# Patient Record
Sex: FEMALE | Race: WHITE | Employment: UNEMPLOYED | URBAN - METROPOLITAN AREA
[De-identification: names, ages, dates, MRNs, and addresses within clinical notes are randomized per-mention and may not be internally consistent; named-entity substitution may affect disease eponyms.]

---

## 2020-10-13 ENCOUNTER — HOSPITAL ENCOUNTER (OUTPATIENT)
Dept: BEHAVIORAL HEALTH | Facility: CLINIC | Age: 55
Discharge: HOME | End: 2020-10-13
Attending: PSYCHIATRY & NEUROLOGY | Admitting: PSYCHIATRY & NEUROLOGY
Payer: COMMERCIAL

## 2020-10-13 PROCEDURE — 90791 PSYCH DIAGNOSTIC EVALUATION: CPT | Mod: 95 | Performed by: COUNSELOR

## 2020-10-13 ASSESSMENT — ANXIETY QUESTIONNAIRES
4. TROUBLE RELAXING: MORE THAN HALF THE DAYS
3. WORRYING TOO MUCH ABOUT DIFFERENT THINGS: NEARLY EVERY DAY
2. NOT BEING ABLE TO STOP OR CONTROL WORRYING: NEARLY EVERY DAY
1. FEELING NERVOUS, ANXIOUS, OR ON EDGE: NEARLY EVERY DAY
7. FEELING AFRAID AS IF SOMETHING AWFUL MIGHT HAPPEN: NEARLY EVERY DAY
GAD7 TOTAL SCORE: 18
6. BECOMING EASILY ANNOYED OR IRRITABLE: MORE THAN HALF THE DAYS
5. BEING SO RESTLESS THAT IT IS HARD TO SIT STILL: MORE THAN HALF THE DAYS

## 2020-10-13 ASSESSMENT — PATIENT HEALTH QUESTIONNAIRE - PHQ9: SUM OF ALL RESPONSES TO PHQ QUESTIONS 1-9: 20
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
## 2020-10-14 ENCOUNTER — TELEPHONE (OUTPATIENT)
Dept: BEHAVIORAL HEALTH | Facility: CLINIC | Age: 55
End: 2020-10-14

## 2020-10-14 RX ORDER — HYDROXYZINE HYDROCHLORIDE 25 MG/1
25 TABLET, FILM COATED ORAL
COMMUNITY
Start: 2020-10-02

## 2020-10-14 RX ORDER — QUETIAPINE FUMARATE 50 MG/1
TABLET, FILM COATED ORAL
COMMUNITY
Start: 2020-10-02

## 2020-10-14 RX ORDER — DIVALPROEX SODIUM 250 MG/1
1000 TABLET, EXTENDED RELEASE ORAL
COMMUNITY
Start: 2020-08-11

## 2020-10-14 RX ORDER — LAMOTRIGINE 25 MG/1
TABLET ORAL
COMMUNITY
Start: 2020-10-02

## 2020-10-14 ASSESSMENT — ANXIETY QUESTIONNAIRES
3. WORRYING TOO MUCH ABOUT DIFFERENT THINGS: SEVERAL DAYS
GAD7 TOTAL SCORE: 11
5. BEING SO RESTLESS THAT IT IS HARD TO SIT STILL: SEVERAL DAYS
7. FEELING AFRAID AS IF SOMETHING AWFUL MIGHT HAPPEN: SEVERAL DAYS
IF YOU CHECKED OFF ANY PROBLEMS ON THIS QUESTIONNAIRE, HOW DIFFICULT HAVE THESE PROBLEMS MADE IT FOR YOU TO DO YOUR WORK, TAKE CARE OF THINGS AT HOME, OR GET ALONG WITH OTHER PEOPLE: VERY DIFFICULT
1. FEELING NERVOUS, ANXIOUS, OR ON EDGE: NEARLY EVERY DAY
GAD7 TOTAL SCORE: 18
6. BECOMING EASILY ANNOYED OR IRRITABLE: SEVERAL DAYS
2. NOT BEING ABLE TO STOP OR CONTROL WORRYING: SEVERAL DAYS

## 2020-10-14 ASSESSMENT — PATIENT HEALTH QUESTIONNAIRE - PHQ9
SUM OF ALL RESPONSES TO PHQ QUESTIONS 1-9: 18
5. POOR APPETITE OR OVEREATING: NEARLY EVERY DAY

## 2020-10-14 NOTE — TELEPHONE ENCOUNTER
Completed admission to A1 track in 55+ Program for start tomorrow 10/15/2020.  Preferred contact is via email at: [email or web address]    Completed successful video test with Jammie arce.  Will send a copy of her safety plan via mail to her per her request.    Roseanne Valdez, OTR/L

## 2020-10-15 ENCOUNTER — HOSPITAL ENCOUNTER (OUTPATIENT)
Dept: BEHAVIORAL HEALTH | Facility: CLINIC | Age: 55
End: 2020-10-15
Attending: PSYCHIATRY & NEUROLOGY
Payer: COMMERCIAL

## 2020-10-15 PROCEDURE — 90853 GROUP PSYCHOTHERAPY: CPT | Mod: 95

## 2020-10-15 PROCEDURE — G0177 OPPS/PHP; TRAIN & EDUC SERV: HCPCS | Mod: 95 | Performed by: OCCUPATIONAL THERAPIST

## 2020-10-15 PROCEDURE — G0177 OPPS/PHP; TRAIN & EDUC SERV: HCPCS | Mod: GT

## 2020-10-15 ASSESSMENT — ANXIETY QUESTIONNAIRES: GAD7 TOTAL SCORE: 11

## 2020-10-15 NOTE — GROUP NOTE
Psychoeducation Group Note    PATIENT'S NAME: Jammie Mariee  MRN:   [id number]  :   1965  ACCT. NUMBER: [id number]  DATE OF SERVICE: 10/15/20  START TIME: 11:00 AM  END TIME: 11:50 AM  FACILITATOR: Alicia He RN  TOPIC: DELMAR RN Group: Health Maintenance  55+ Program  TRACK: 1    NUMBER OF PARTICIPANTS: 4    Summary of Group / Topics Discussed:  Health Maintenance: Eight Dimensions of Wellness: The concept of holistic health through the model of eight dimensions was introduced. Group members participated in identifying behaviors and activities in each of the dimensions of wellness.  The importance of each dimension was reinforced and the concept of balance in life as it relates to wellness was explored.      Patient Session Goals / Objectives:    Verbalized understanding of balance in wellness and how it relates to their life    Identified and explained the eight dimensions of wellness    Categorized activities and wellness needs into corresponding dimensions appropriately during exercise    Telemedicine Visit: The patient's condition can be safely assessed and treated via synchronous audio and visual telemedicine encounter.      Reason for Telemedicine Visit: Services only offered telehealth    Originating Site (Patient Location): Patient's home    Distant Site (Provider Location): Provider Remote Setting    Consent:  The patient/guardian has verbally consented to: the potential risks and benefits of telemedicine (video visit) versus in person care; bill my insurance or make self-payment for services provided; and responsibility for payment of non-covered services.     Mode of Communication:  Video Conference via [employer]    As the provider I attest to compliance with applicable laws and regulations related to telemedicine.       Patient Participation / Response:  Fully participated with the group by sharing personal reflections / insights and openly received / provided feedback with other  participants.    Demonstrated understanding of topics discussed through group discussion and participation    Treatment Plan:  Patient has a current master individualized treatment plan.  See Epic treatment plan for more information.    Alicia He RN

## 2020-10-15 NOTE — GROUP NOTE
Telemedicine Visit: The patient's condition can be safely assessed and treated via synchronous audio and visual telemedicine encounter.      Reason for Telemedicine Visit: Services only offered telehealth    Originating Site (Patient Location): Patient's home    Distant Site (Provider Location): Provider Remote Setting    Consent:  The patient/guardian has verbally consented to: the potential risks and benefits of telemedicine (video visit) versus in person care; bill my insurance or make self-payment for services provided; and responsibility for payment of non-covered services.     Mode of Communication:  Video Conference via [employer]    As the provider I attest to compliance with applicable laws and regulations related to telemedicine.  Process Group Note    PATIENT'S NAME: Jammie Mariee  MRN:   [id number]  :   1965  ACCT. NUMBER: [id number]  DATE OF SERVICE: 10/15/20  START TIME:  9:00 AM  END TIME:  9:50 AM  FACILITATOR: Ermelinda Flynn LMFT  TOPIC:  Process Group    Diagnoses:  296.53 Bipolar I Disorder Current or Most Recent Episode Depressed, Severe    4. Other Diagnoses that is relevant to services:   300.00 (F41.9) Unspecified Anxiety Disorder       55+ Program  TRACK: A1    NUMBER OF PARTICIPANTS: 4        Data:    Session content: At the start of this group, patients were invited to check in by identifying themselves, describing their current emotional status, and identifying issues to address in this group.   Area(s) of treatment focus addressed in this session included Symptom Management.  Processed struggling with changes in her life over the past couple months. Reports symptoms of hiwot and depression have impacted her ability to continue working this year and she has struggled to find a routine and structure to her life since leaving her job. Reports she worries about her health because of the pandemic and having COPD. Reports she does not do well being at home without having purpose  and feeling stuck. Reports she takes her medications to support her mental health and denies any safety concerns at this time.     Therapeutic Interventions/Treatment Strategies:  Psychotherapist offered support, feedback and validation and reinforced use of skills. Treatment modalities used include Cognitive Behavioral Therapy. Interventions include Behavioral Activation: Explored how behaviors effect mood and interact with thoughts and feelings and Coping Skills: Facilitated understanding of  what factors may contribute to symptom relapse and skills plan to manage symptom relapse .    Assessment:    Patient response:   Patient responded to session by accepting feedback, listening and verbalizing understanding    Possible barriers to participation / learning include: and no barriers identified    Health Issues:   None reported       Substance Use Review:   Substance Use: No active concerns identified.    Mental Status/Behavioral Observations  Appearance:   Appropriate   Eye Contact:   Good   Psychomotor Behavior: Normal   Attitude:   Cooperative   Orientation:   All  Speech   Rate / Production: Normal    Volume:  Normal   Mood:    Sad   Affect:    Subdued  Worrisome   Thought Content:   Clear  Thought Form:  Coherent  Logical     Insight:    Poor     Plan:     Safety Plan: No current safety concerns identified.  Recommended that patient call 911 or go to the local ED should there be a change in any of these risk factors.     Barriers to treatment: None identified    Patient Contracts (see media tab):  None    Substance Use: Not addressed in session     Continue or Discharge: Patient will continue in 55+ Program (55+) as planned. Patient is likely to benefit from learning and using skills as they work toward the goals identified in their treatment plan.      ARLENE Savage  October 15, 2020

## 2020-10-16 NOTE — GROUP NOTE
Psychoeducation Group Note    PATIENT'S NAME: Jammie Mariee  MRN:   [id number]  :   1965  ACCT. NUMBER: [id number]  DATE OF SERVICE: 10/15/20  START TIME: 10:00 AM  END TIME: 10:50 AM  FACILITATOR: Roseanne Valdez OTR/L  TOPIC: MH Life Skills Group: Resiliency Development  55+ Program  TRACK: A1    NUMBER OF PARTICIPANTS: 4    Telemedicine Visit: The patient's condition can be safely assessed and treated via synchronous audio and visual telemedicine encounter.      Reason for Telemedicine Visit: Services only offered telehealth    Originating Site (Patient Location): Patient's home    Distant Site (Provider Location): [facility]    Consent:  The patient/guardian has verbally consented to: the potential risks and benefits of telemedicine (video visit) versus in person care; bill my insurance or make self-payment for services provided; and responsibility for payment of non-covered services.     Mode of Communication:  Video Conference via [employer]    As the provider I attest to compliance with applicable laws and regulations related to telemedicine.     Summary of Group / Topics Discussed:  Resiliency Development:  Self Esteem and Self Compassion: Positive Focus with emphasis on Group Building: Patients were given the opportunity to reflect and identified the positive aspects of their life.  Patients were taught about the importance of self kindness on mental health wellbeing.  Patients were also given the opportunity to improve self efficacy, self sufficiency, quality of life and a sense of mastery and competency by identifying positive aspects of their life and to instill hope.  Opportunity was given to patients to connect and work on building group cohesion.     Patient Session Goals / Objectives:    Identified personal strengths and qualities about themselves as a way to provide hope and build self-compassion as a way to effectively manage both mental  health and substance abuse/abuse symptoms     Improved awareness of positive qualities and how this contribute to the process of recovery and mental health wellbeing and resiliency      Established a plan for practice of these skills in their own environments    Practiced and reflected on how to generalize taught skills to their everyday life        Patient Participation / Response:  Fully participated with the group by sharing personal reflections / insights and openly received / provided feedback with other participants.    Patient presentation: constricted affect; mood seemed low;  active in group discussion sharing examples and exeperiences with peers, Verbalized understanding of content and Patient would benefit from additional opportunities to practice the content to be able to generalize it to their everyday life with increased intentionality, consistency, and efficacy in support of their psychiatric recovery     Jammie began the 55+ Program today.  She was oriented to Life Skills groups and welcomed.     Treatment Plan:  Patient has an initial individualized treatment plan that was created as part of their diagnostic assessment / admission process.  A master individualized treatment plan is in the process of being developed with the patient and multi-disciplinary care team.    Roseanne Valdez OTR/L

## 2020-10-19 ENCOUNTER — HOSPITAL ENCOUNTER (OUTPATIENT)
Dept: BEHAVIORAL HEALTH | Facility: CLINIC | Age: 55
End: 2020-10-19
Attending: PSYCHIATRY & NEUROLOGY
Payer: COMMERCIAL

## 2020-10-19 PROCEDURE — 90853 GROUP PSYCHOTHERAPY: CPT | Mod: GT

## 2020-10-19 NOTE — GROUP NOTE
Telemedicine Visit: The patient's condition can be safely assessed and treated via synchronous audio and visual telemedicine encounter.      Reason for Telemedicine Visit: Services only offered telehealth    Originating Site (Patient Location): Patient's home    Distant Site (Provider Location): Provider Remote Setting    Consent:  The patient/guardian has verbally consented to: the potential risks and benefits of telemedicine (video visit) versus in person care; bill my insurance or make self-payment for services provided; and responsibility for payment of non-covered services.     Mode of Communication:  Video Conference via [employer]time    As the provider I attest to compliance with applicable laws and regulations related to telemedicine.  Process Group Note    PATIENT'S NAME: Jammie Mariee  MRN:   [id number]  :   1965  ACCT. NUMBER: [id number]  DATE OF SERVICE: 10/19/20  START TIME: 10:00 AM  END TIME: 10:50 AM  FACILITATOR: Ermelinda Flynn LMFT  TOPIC:  Process Group    Diagnoses:  296.53 Bipolar I Disorder Current or Most Recent Episode Depressed, Severe    4. Other Diagnoses that is relevant to services:   300.00 (F41.9) Unspecified Anxiety Disorder       55+ Program  TRACK: A1    NUMBER OF PARTICIPANTS: 5          Data:    Session content: At the start of this group, patients were invited to check in by identifying themselves, describing their current emotional status, and identifying issues to address in this group.   Area(s) of treatment focus addressed in this session included Symptom Management.  Reports she continues to experience no motivation and feeling lonely and isolated. Reports she spent the day with her daughter watching movies and feels like that was not productive enough. Explored ways to acknowledge accomplishment and start to create a regular routine. Denies any safety concerns and reports she is taking her medications as prescribed.     Therapeutic Interventions/Treatment  Strategies:  Psychotherapist offered support, feedback and validation and reinforced use of skills. Treatment modalities used include Cognitive Behavioral Therapy. Interventions include Behavioral Activation: Explored how behaviors effect mood and interact with thoughts and feelings and Coping Skills: Assisted patient in understanding the purpose of planning / creating / participating / sharing in positive experiences.    Assessment:    Patient response:   Patient responded to session by accepting feedback, giving feedback, listening and verbalizing understanding    Possible barriers to participation / learning include: and no barriers identified    Health Issues:   None reported       Substance Use Review:   Substance Use: No active concerns identified.    Mental Status/Behavioral Observations  Appearance:   Appropriate   Eye Contact:   Good   Psychomotor Behavior: Normal   Attitude:   Cooperative   Orientation:   All  Speech   Rate / Production: Normal    Volume:  Normal   Mood:    Depressed  Sad   Affect:    Worrisome   Thought Content:   Clear  Thought Form:  Coherent  Logical     Insight:    Poor     Plan:     Safety Plan: No current safety concerns identified.  Recommended that patient call 911 or go to the local ED should there be a change in any of these risk factors.     Barriers to treatment: None identified    Patient Contracts (see media tab):  None    Substance Use: Not addressed in session     Continue or Discharge: Patient will continue in 55+ Program (55+) as planned. Patient is likely to benefit from learning and using skills as they work toward the goals identified in their treatment plan.      ARLENE Savage  October 19, 2020

## 2020-10-19 NOTE — GROUP NOTE
Telemedicine Visit: The patient's condition can be safely assessed and treated via synchronous audio and visual telemedicine encounter.      Reason for Telemedicine Visit: Services only offered telehealth    Originating Site (Patient Location): Patient's home    Distant Site (Provider Location): Provider Remote Setting    Consent:  The patient/guardian has verbally consented to: the potential risks and benefits of telemedicine (video visit) versus in person care; bill my insurance or make self-payment for services provided; and responsibility for payment of non-covered services.     Mode of Communication:  Video Conference via [employer]    As the provider I attest to compliance with applicable laws and regulations related to telemedicine.  Psychotherapy Group Note    PATIENT'S NAME: Jammie Mariee  MRN:   [id number]  :   1965  ACCT. NUMBER: [id number]  DATE OF SERVICE: 10/19/20  START TIME: 11:00 AM  END TIME: 11:50 AM  FACILITATOR: Ermelinda Flynn LMFT  TOPIC:  EBP Group: Coping Skills  55+ Program  TRACK: A1    NUMBER OF PARTICIPANTS: 5    Summary of Group / Topics Discussed:  Coping Skills: Self-Soothe: Patients learned to apply self-soothe as a way to decrease heightened stress in the moment.  Patients identified situations that necessitate self-soothe strategies.  They focused on ways to manage physical symptoms of distress using the senses. They discussed how to distinguish when this can be useful in their lives when other strategies are more relevant or helpful.    Patient Session Goals / Objectives:    Understand the purpose of using the senses to decrease distress    Process what happens in the body when using self-soothe strategies    Demonstrate understanding of when to use self-soothe strategies    Identify and problem solve barriers to applying self-soothe strategies.    Choose 1-2 self-soothe strategies to apply during times of distress.        Patient Participation / Response:  Minimally  participated, only when prompted / asked.    Demonstrated understanding of topics discussed through group discussion and participation    Treatment Plan:  Patient has an initial individualized treatment plan that was created as part of their diagnostic assessment / admission process.  A master individualized treatment plan is in the process of being developed with the patient and multi-disciplinary care team.    ARLENE Savage

## 2020-10-20 ENCOUNTER — HOSPITAL ENCOUNTER (OUTPATIENT)
Dept: BEHAVIORAL HEALTH | Facility: CLINIC | Age: 55
End: 2020-10-20
Attending: PSYCHIATRY & NEUROLOGY
Payer: COMMERCIAL

## 2020-10-20 PROCEDURE — 90853 GROUP PSYCHOTHERAPY: CPT | Mod: GT

## 2020-10-20 PROCEDURE — G0177 OPPS/PHP; TRAIN & EDUC SERV: HCPCS | Mod: 95

## 2020-10-20 PROCEDURE — G0177 OPPS/PHP; TRAIN & EDUC SERV: HCPCS | Mod: GT | Performed by: OCCUPATIONAL THERAPIST

## 2020-10-20 NOTE — GROUP NOTE
Telemedicine Visit: The patient's condition can be safely assessed and treated via synchronous audio and visual telemedicine encounter.      Reason for Telemedicine Visit: Services only offered telehealth    Originating Site (Patient Location): Patient's home    Distant Site (Provider Location): Provider Remote Setting    Consent:  The patient/guardian has verbally consented to: the potential risks and benefits of telemedicine (video visit) versus in person care; bill my insurance or make self-payment for services provided; and responsibility for payment of non-covered services.     Mode of Communication:  Video Conference via [employer]    As the provider I attest to compliance with applicable laws and regulations related to telemedicine.  Process Group Note    PATIENT'S NAME: Jammie Mariee  MRN:   [id number]  :   1965  ACCT. NUMBER: [id number]  DATE OF SERVICE: 10/20/20  START TIME: 10:00 AM  END TIME: 10:50 AM  FACILITATOR: Ermelinda Flynn LMFT  TOPIC:  Process Group    Diagnoses:  296.53 Bipolar I Disorder Current or Most Recent Episode Depressed, Severe    4. Other Diagnoses that is relevant to services:   300.00 (F41.9) Unspecified Anxiety Disorder       55+ Program  TRACK:A1    NUMBER OF PARTICIPANTS: 7        Data:    Session content: At the start of this group, patients were invited to check in by identifying themselves, describing their current emotional status, and identifying issues to address in this group.   Area(s) of treatment focus addressed in this session included Symptom Management.  Processed continued low mood and lack of motivation to do anything throughout her day. Identifies she gets stuck in a pattern of worry about things she should be doing and then feels overwhelmed by negative self talk. Explored ways to use self soothe skills in the moment to calm worries and to practice more self compassion     Therapeutic Interventions/Treatment Strategies:  Psychotherapist offered  "support, feedback and validation and reinforced use of skills. Treatment modalities used include Cognitive Behavioral Therapy. Interventions include Behavioral Activation: Explored how behaviors effect mood and interact with thoughts and feelings and Coping Skills: Discussed how the use of intentional \"in the moment\" actions can help reduce distress.    Assessment:    Patient response:   Patient responded to session by accepting feedback, listening and verbalizing understanding    Possible barriers to participation / learning include: and no barriers identified    Health Issues:   None reported       Substance Use Review:   Substance Use: No active concerns identified.    Mental Status/Behavioral Observations  Appearance:   Appropriate   Eye Contact:   Good   Psychomotor Behavior: Normal   Attitude:   Cooperative   Orientation:   All  Speech   Rate / Production: Normal    Volume:  Normal   Mood:    Anxious  Depressed   Affect:    Worrisome   Thought Content:   Clear  Thought Form:  Coherent  Logical     Insight:    Fair     Plan:     Safety Plan: No current safety concerns identified.  Recommended that patient call 911 or go to the local ED should there be a change in any of these risk factors.     Barriers to treatment: None identified    Patient Contracts (see media tab):  None    Substance Use: Not addressed in session     Continue or Discharge: Patient will continue in 55+ Program (55+) as planned. Patient is likely to benefit from learning and using skills as they work toward the goals identified in their treatment plan.      ARLENE Savage  October 20, 2020  "

## 2020-10-20 NOTE — GROUP NOTE
Psychoeducation Group Note    PATIENT'S NAME: Jammie Mariee  MRN:   [id number]  :   1965  ACCT. NUMBER: [id number]  DATE OF SERVICE: 10/20/20  START TIME: 11:00 AM  END TIME: 11:50 AM  FACILITATOR: Roseanne Valdez OTR/L  TOPIC: MH Life Skills Group: Resiliency Development  55+ Program  TRACK: A1    NUMBER OF PARTICIPANTS: 5    Telemedicine Visit: The patient's condition can be safely assessed and treated via telephone only telemedicine encounter.      Reason for Telemedicine Visit: Services only offered telehealth    Originating Site (Patient Location): Patient's home    [facility]    Consent:  The patient/guardian has verbally consented to: the potential risks and benefits of telemedicine (telephone visit) versus in person care; bill my insurance or make self-payment for services provided; and responsibility for payment of non-covered services.     Mode of Communication:  Telephone Conference via [employer]    As the provider I attest to compliance with applicable laws and regulations related to telemedicine.     Summary of Group / Topics Discussed:  Resiliency Development:  Self Esteem and Self Compassion: Positive Focus: Part 2:  Patients were given the opportunity to reflect and identified the positive aspects of their life.  Patients were taught about the importance of self kindness on mental health wellbeing.  Patients were also given the opportunity to improve self efficacy, self sufficiency, quality of life and a sense of mastery and competency by identifying positive aspects of their life and to instill hope.  Practiced GLAD technique in group today.  Discussed other skills to build resiliency.      Patient Session Goals / Objectives:    Identified personal strengths and qualities about themselves as a way to provide hope and build self-compassion as a way to effectively manage both mental health and substance  abuse/abuse symptoms     Improved awareness of positive qualities and how this contribute to the process of recovery and mental health wellbeing and resiliency      Established a plan for practice of these skills in their own environments    Practiced and reflected on how to generalize taught skills to their everyday life    Introduced and practiced GLAD journaling technique to encourage positive thinking        Patient Participation / Response:  Fully participated with the group by sharing personal reflections / insights and openly received / provided feedback with other participants.    Patient presentation: unable to fully assess due to phone only participation; active in group discussion sharing examples with the group, Verbalized understanding of content and Patient would benefit from additional opportunities to practice the content to be able to generalize it to their everyday life with increased intentionality, consistency, and efficacy in support of their psychiatric recovery    Treatment Plan:  Patient has an initial individualized treatment plan that was created as part of their diagnostic assessment / admission process.  A master individualized treatment plan is in the process of being developed with the patient and multi-disciplinary care team.    Roseanne Valdez, JUNER/L

## 2020-10-20 NOTE — ADDENDUM NOTE
Encounter addended by: Johnie Grayson MD on: 10/20/2020 8:09 AM   Actions taken: Clinical Note Signed

## 2020-10-20 NOTE — GROUP NOTE
Psychoeducation Group Note    PATIENT'S NAME: Jammie Mariee  MRN:   [id number]  :   1965  ACCT. NUMBER: [id number]  DATE OF SERVICE: 10/20/20  START TIME:  9:00 AM  END TIME:  9:50 AM  FACILITATOR: Alicia He RN  TOPIC: DELMAR RN Group: [facility]  55+ Program  TRACK: a1    NUMBER OF PARTICIPANTS: 7    Summary of Group / Topics Discussed:  Foundations of Health: Sleep: Case study/sleep hygiene: Patients explored the connection between sleep and mental illness. Patients learned about how adequate sleep can improve health, productivity, wellness, quality of life, and safety.     Patient Session Goals / Objectives:  ? Demonstrated understanding of sleep hygiene practices and benefits of sleep  ? Identified sleep hygiene strategies to utilize     Described the connection between sleep disturbances and mental illness  Telemedicine Visit: The patient's condition can be safely assessed and treated via synchronous audio and visual telemedicine encounter.      Reason for Telemedicine Visit: Services only offered telehealth    Originating Site (Patient Location): Patient's home    Distant Site (Provider Location): Provider Remote Setting    Consent:  The patient/guardian has verbally consented to: the potential risks and benefits of telemedicine (video visit) versus in person care; bill my insurance or make self-payment for services provided; and responsibility for payment of non-covered services.     Mode of Communication:  Video Conference via [employer]    As the provider I attest to compliance with applicable laws and regulations related to telemedicine.       Patient Participation / Response:  Fully participated with the group by sharing personal reflections / insights and openly received / provided feedback with other participants.    Demonstrated understanding of topics discussed through group discussion and participation    Treatment Plan:  Patient has a current master individualized treatment  plan.  See Epic treatment plan for more information.    Alicia He RN

## 2020-10-20 NOTE — PROGRESS NOTES
Patient Active Problem List   Diagnosis     Bipolar 1 disorder, depressed (H)       Current Outpatient Medications:      divalproex sodium extended-release (DEPAKOTE ER) 250 MG 24 hr tablet, Take 1,000 mg by mouth, Disp: , Rfl:      hydrOXYzine (ATARAX) 25 MG tablet, Take 25 mg by mouth, Disp: , Rfl:      lamoTRIgine (LAMICTAL) 25 MG tablet, Take 1 tab daily for 6 days, then 2 tabs daily for 14 days, then 4 tabs daily for 10 days, Disp: , Rfl:      QUEtiapine (SEROQUEL) 50 MG tablet, Take 7.5 tablets at bedtime and 0.5 tablets daily., Disp: , Rfl:   Psychiatry staffing: case discussed  Diagnosis:    As above, hard to motivate, recent start.

## 2020-10-22 ENCOUNTER — HOSPITAL ENCOUNTER (OUTPATIENT)
Dept: BEHAVIORAL HEALTH | Facility: CLINIC | Age: 55
End: 2020-10-22
Attending: PSYCHIATRY & NEUROLOGY
Payer: COMMERCIAL

## 2020-10-22 PROCEDURE — G0177 OPPS/PHP; TRAIN & EDUC SERV: HCPCS | Mod: 95 | Performed by: OCCUPATIONAL THERAPIST

## 2020-10-22 PROCEDURE — G0177 OPPS/PHP; TRAIN & EDUC SERV: HCPCS | Mod: 95

## 2020-10-22 PROCEDURE — 90853 GROUP PSYCHOTHERAPY: CPT | Mod: GT

## 2020-10-22 NOTE — GROUP NOTE
Psychoeducation Group Note    PATIENT'S NAME: Jammie Mariee  MRN:   [id number]  :   1965  ACCT. NUMBER: [id number]  DATE OF SERVICE: 10/22/20  START TIME: 11:00 AM  END TIME: 11:50 AM  FACILITATOR: Alicia He RN  TOPIC: DELMAR RN Group: [facility]  55+ Program  TRACK: a1    NUMBER OF PARTICIPANTS: 5    Summary of Group / Topics Discussed:  Foundations of Health: Sleep: Case study/sleep hygiene: Patients explored the connection between sleep and mental illness. Patients learned about how adequate sleep can improve health, productivity, wellness, quality of life, and safety.     Patient Session Goals / Objectives:  ? Demonstrated understanding of sleep hygiene practices and benefits of sleep  ? Identified sleep hygiene strategies to utilize     Described the connection between sleep disturbances and mental illness  Telemedicine Visit: The patient's condition can be safely assessed and treated via synchronous audio and visual telemedicine encounter.      Reason for Telemedicine Visit: Services only offered telehealth    Originating Site (Patient Location): Patient's home    Distant Site (Provider Location): Provider Remote Setting    Consent:  The patient/guardian has verbally consented to: the potential risks and benefits of telemedicine (video visit) versus in person care; bill my insurance or make self-payment for services provided; and responsibility for payment of non-covered services.     Mode of Communication:  Video Conference via [employer]    As the provider I attest to compliance with applicable laws and regulations related to telemedicine.       Patient Participation / Response:  Fully participated with the group by sharing personal reflections / insights and openly received / provided feedback with other participants.    Demonstrated understanding of topics discussed through group discussion and participation    Treatment Plan:  Patient has a current master individualized treatment  plan.  See Epic treatment plan for more information.    Alicia He RN

## 2020-10-22 NOTE — GROUP NOTE
Telemedicine Visit: The patient's condition can be safely assessed and treated via synchronous audio and visual telemedicine encounter.      Reason for Telemedicine Visit: Services only offered telehealth    Originating Site (Patient Location): Patient's home    Distant Site (Provider Location): Provider Remote Setting    Consent:  The patient/guardian has verbally consented to: the potential risks and benefits of telemedicine (video visit) versus in person care; bill my insurance or make self-payment for services provided; and responsibility for payment of non-covered services.     Mode of Communication:  Video Conference via [employer]    As the provider I attest to compliance with applicable laws and regulations related to telemedicine.  Process Group Note    PATIENT'S NAME: Jammie Mariee  MRN:   [id number]  :   1965  ACCT. NUMBER: [id number]  DATE OF SERVICE: 10/22/20  START TIME:  9:00 AM  END TIME:  9:50 AM  FACILITATOR: Ermelinda Flynn LMFT  TOPIC:  Process Group    Diagnoses:  296.53 Bipolar I Disorder Current or Most Recent Episode Depressed, Severe    4. Other Diagnoses that is relevant to services:   300.00 (F41.9) Unspecified Anxiety Disorder       55+ Program  TRACK: A1    NUMBER OF PARTICIPANTS: 7          Data:    Session content: At the start of this group, patients were invited to check in by identifying themselves, describing their current emotional status, and identifying issues to address in this group.   Area(s) of treatment focus addressed in this session included Symptom Management.  Processed feeling accomplished over the past couple days because her social security was approved. Identifies she has had the motivation to be more productive at home spending time with family and cooking some meals. Reports she still feels depressed and anxious in the morning. Explored ways to using coping skills to cope through morning anxiety. Reports taking medications and denies any safety  "concerns.     Therapeutic Interventions/Treatment Strategies:  Psychotherapist offered support, feedback and validation and reinforced use of skills. Treatment modalities used include Cognitive Behavioral Therapy. Interventions include Behavioral Activation: Explored how behaviors effect mood and interact with thoughts and feelings and Coping Skills: Assisted patient in identifying 1-2 healthy distraction skills to reduce overall distress, Discussed how the use of intentional \"in the moment\" actions can help reduce distress and Reviewed patients current calming practices and discussed a more formal way of practicing and accessing skills.    Assessment:    Patient response:   Patient responded to session by accepting feedback, giving feedback, listening and verbalizing understanding    Possible barriers to participation / learning include: and no barriers identified    Health Issues:   None reported       Substance Use Review:   Substance Use: No active concerns identified.    Mental Status/Behavioral Observations  Appearance:   Appropriate   Eye Contact:   Good   Psychomotor Behavior: Normal   Attitude:   Cooperative   Orientation:   All  Speech   Rate / Production: Normal    Volume:  Normal   Mood:    Anxious   Affect:    Worrisome   Thought Content:   Clear  Thought Form:  Coherent  Logical     Insight:    Fair     Plan:     Safety Plan: No current safety concerns identified.  Recommended that patient call 911 or go to the local ED should there be a change in any of these risk factors.     Barriers to treatment: None identified    Patient Contracts (see media tab):  None    Substance Use: Not addressed in session     Continue or Discharge: Patient will continue in 55+ Program (55+) as planned. Patient is likely to benefit from learning and using skills as they work toward the goals identified in their treatment plan.      ARLENE Savage  October 22, 2020  "

## 2020-10-23 NOTE — GROUP NOTE
"Psychoeducation Group Note    PATIENT'S NAME: Jammie Mariee  MRN:   [id number]  :   1965  ACCT. NUMBER: [id number]  DATE OF SERVICE: 10/22/20  START TIME: 10:00 AM  END TIME: 10:50 AM  FACILITATOR: Roseanne Valdez OTR/L  TOPIC:  Life Skills Group: Resiliency Development  55+ Program  TRACK: A1    NUMBER OF PARTICIPANTS: 7    Summary of Group / Topics Discussed:  Resiliency Development:  Self Esteem and Self Compassion: Occupational Gifts: Patients were given the opportunity to reflect and identified different types of occupations (activities) they participate in to maintain and/or build resilience in their lives.  Patients were taught about how \"doing\" promotes mental health recovery by providing, routine and structure, empowerment, sense of self, and connectedness.  Patients identified occupations (activities) that promote mental health: connection, centering, creation, contemplation, and contribution.  Patients were also given the opportunity to improve self efficacy, self sufficiency, quality of life and a sense of mastery and competency by identifying positive aspects of their life and to instill hope.      Patient Session Goals / Objectives:    Identified occupations (activities) they can use to promote mental health recovery and as a way to effectively manage both mental health and substance abuse/abuse symptoms     Improved awareness of positive qualities of occupations they currently participate in and new occupations they might try and how this contribute to the process of recovery and mental health wellbeing and resiliency      Established a plan for practice of these skills in their own environments    Practiced and reflected on how to generalize taught skills to their everyday life        Patient Participation / Response:  Minimally participated, only when prompted / asked.    Patient presentation: quiet in group; constricted affect; mood seemed depressed and Patient would benefit from " additional opportunities to practice the content to be able to generalize it to their everyday life with increased intentionality, consistency, and efficacy in support of their psychiatric recovery    Treatment Plan:  Patient has an initial individualized treatment plan that was created as part of their diagnostic assessment / admission process.  A master individualized treatment plan is in the process of being developed with the patient and multi-disciplinary care team.    Roseanne Valdez, OTR/L

## 2020-10-26 ENCOUNTER — HOSPITAL ENCOUNTER (OUTPATIENT)
Dept: BEHAVIORAL HEALTH | Facility: CLINIC | Age: 55
End: 2020-10-26
Attending: PSYCHIATRY & NEUROLOGY
Payer: COMMERCIAL

## 2020-10-26 PROCEDURE — 90853 GROUP PSYCHOTHERAPY: CPT | Mod: 95

## 2020-10-26 PROCEDURE — G0177 OPPS/PHP; TRAIN & EDUC SERV: HCPCS | Mod: GT | Performed by: OCCUPATIONAL THERAPIST

## 2020-10-26 NOTE — GROUP NOTE
Telemedicine Visit: The patient's condition can be safely assessed and treated via synchronous audio and visual telemedicine encounter.      Reason for Telemedicine Visit: Services only offered telehealth    Originating Site (Patient Location): Patient's home    Distant Site (Provider Location): Provider Remote Setting    Consent:  The patient/guardian has verbally consented to: the potential risks and benefits of telemedicine (video visit) versus in person care; bill my insurance or make self-payment for services provided; and responsibility for payment of non-covered services.     Mode of Communication:  Video Conference via [employer]    As the provider I attest to compliance with applicable laws and regulations related to telemedicine.  Psychotherapy Group Note    PATIENT'S NAME: Jammie Mariee  MRN:   [id number]  :   1965  ACCT. NUMBER: [id number]  DATE OF SERVICE: 10/26/20  START TIME: 11:00 AM  END TIME: 11:50 AM  FACILITATOR: Ermelinda Flynn LMFT  TOPIC: MH EBP Group: Cognitive Restructuring  55+ Program  TRACK: A1    NUMBER OF PARTICIPANTS: 7    Summary of Group / Topics Discussed:  Cognitive Restructuring: Core Beliefs: Patients received an overview of what a core belief is, and how they develop. Patients then began to identify their negative core beliefs. Patients worked to modify core beliefs with the goal of improved self-image and functioning.     Patient Session Goals / Objectives:    Familiarize self with the concept of core beliefs and how they develop.      Explore personal core beliefs (positive and negative)    Develop / advance recognition of the connection between negative thoughts and negative core beliefs.    Formulate new neutral/positive core beliefs               Patient Participation / Response:  Fully participated with the group by sharing personal reflections / insights and openly received / provided feedback with other participants.    Demonstrated understanding of topics  discussed through group discussion and participation, Expressed understanding of the relationship between behaviors, thoughts, and feelings, Verbalized understanding of patient's own thought patterns and how they impact their mood and behaviors and Practiced skills in session    Treatment Plan:  Patient has a current master individualized treatment plan.  See Epic treatment plan for more information.    ARLENE Savage

## 2020-10-26 NOTE — GROUP NOTE
Psychoeducation Group Note    PATIENT'S NAME: Jammie Mariee  MRN:   [id number]  :   1965  ACCT. NUMBER: [id number]  DATE OF SERVICE: 10/26/20  START TIME:  9:00 AM  END TIME:  9:50 AM  FACILITATOR: Roseanne Valdez OTR/L  TOPIC: MH Life Skills Group: Sensory Approaches in Mental Health  55+ Program  TRACK: A1    NUMBER OF PARTICIPANTS: 7    Telemedicine Visit: The patient's condition can be safely assessed and treated via synchronous audio and visual telemedicine encounter.      Reason for Telemedicine Visit: Services only offered telehealth    Originating Site (Patient Location): Patient's home    Distant Site (Provider Location): [facility] [place] [address]    Consent:  The patient/guardian has verbally consented to: the potential risks and benefits of telemedicine (video visit) versus in person care; bill my insurance or make self-payment for services provided; and responsibility for payment of non-covered services.     Mode of Communication:  Video Conference via [employer]    As the provider I attest to compliance with applicable laws and regulations related to telemedicine.     Summary of Group / Topics Discussed:  Sensory Approaches in Mental Health:  Coping Through the Senses Introduction: Patients were introduced and taught about neurosensory based skills and strategies related to supporting effective self regulation skills.  Patients were taught about the eight sensory systems and how they can be used for coping with mental health symptoms and stressors.  Patients were provided with an experiential opportunity to increase self-awareness of helpful sensory input and self care activities. Patients were introduced on how to create supportive environments that encourage use of these skills.         Patient Session Goals / Objectives:    Identified specific and individualized neurosensory skills to help when distressed      Identified skills learned and how this  applies to current daily life    Established a plan for practice of these skills in their own environments        Patient Participation / Response:  Fully participated with the group by sharing personal reflections / insights and openly received / provided feedback with other participants.    Patient presentation: constricted affect; gave some examples in group discussion, Verbalized understanding of content and Patient would benefit from additional opportunities to practice the content to be able to generalize it to their everyday life with increased intentionality, consistency, and efficacy in support of their psychiatric recovery    Treatment Plan:  Patient has a current master individualized treatment plan.  See Epic treatment plan for more information.    Roseanne Valdez, OTR/L

## 2020-10-26 NOTE — GROUP NOTE
Telemedicine Visit: The patient's condition can be safely assessed and treated via synchronous audio and visual telemedicine encounter.      Reason for Telemedicine Visit: Services only offered telehealth    Originating Site (Patient Location): Patient's home    Distant Site (Provider Location): Provider Remote Setting    Consent:  The patient/guardian has verbally consented to: the potential risks and benefits of telemedicine (video visit) versus in person care; bill my insurance or make self-payment for services provided; and responsibility for payment of non-covered services.     Mode of Communication:  Video Conference via [employer]    As the provider I attest to compliance with applicable laws and regulations related to telemedicine.  Process Group Note    PATIENT'S NAME: Jammie Mariee  MRN:   [id number]  :   1965  ACCT. NUMBER: [id number]  DATE OF SERVICE: 10/26/20  START TIME: 10:00 AM  END TIME: 10:50 AM  FACILITATOR: Ermelinda Flynn LMFT  TOPIC:  Process Group    Diagnoses:  296.53 Bipolar I Disorder Current or Most Recent Episode Depressed, Severe    4. Other Diagnoses that is relevant to services:   300.00 (F41.9) Unspecified Anxiety Disorder       55+ Program  TRACK: A1    NUMBER OF PARTICIPANTS: 7          Data:    Session content: At the start of this group, patients were invited to check in by identifying themselves, describing their current emotional status, and identifying issues to address in this group.   Area(s) of treatment focus addressed in this session included Symptom Management.  Processed having a couple positive days over the weekend, spending time with her children and going out to eat. Reports today and the previous day she has been feeling fatigued and having no motivation to care for even her ADL's. Reports he  has been taking care of her and making meals. Explored ways to ease into activities and monitor energy levels to take breaks. Explored ways to make steps in  creating routine during her days. No safeety concerns    Therapeutic Interventions/Treatment Strategies:  Psychotherapist offered support, feedback and validation and reinforced use of skills. Treatment modalities used include Cognitive Behavioral Therapy. Interventions include Coping Skills: Assisted patient in identifying 1-2 healthy distraction skills to reduce overall distress and Assisted patient in understanding the purpose of planning / creating / participating / sharing in positive experiences.    Assessment:    Patient response:   Patient responded to session by accepting feedback, listening and verbalizing understanding    Possible barriers to participation / learning include: and no barriers identified    Health Issues:   None reported       Substance Use Review:   Substance Use: No active concerns identified.    Mental Status/Behavioral Observations  Appearance:   Appropriate   Eye Contact:   Good   Psychomotor Behavior: Normal   Attitude:   Cooperative   Orientation:   All  Speech   Rate / Production: Normal    Volume:  Normal   Mood:    Depressed  Sad   Affect:    Worrisome   Thought Content:   Clear  Thought Form:  Coherent  Logical     Insight:    Poor     Plan:     Safety Plan: No current safety concerns identified.  Recommended that patient call 911 or go to the local ED should there be a change in any of these risk factors.     Barriers to treatment: None identified    Patient Contracts (see media tab):  None    Substance Use: Not addressed in session     Continue or Discharge: Patient will continue in 55+ Program (55+) as planned. Patient is likely to benefit from learning and using skills as they work toward the goals identified in their treatment plan.      ARLENE Savage  October 26, 2020

## 2020-10-27 ENCOUNTER — HOSPITAL ENCOUNTER (OUTPATIENT)
Dept: BEHAVIORAL HEALTH | Facility: CLINIC | Age: 55
End: 2020-10-27
Attending: PSYCHIATRY & NEUROLOGY
Payer: COMMERCIAL

## 2020-10-27 PROCEDURE — G0177 OPPS/PHP; TRAIN & EDUC SERV: HCPCS | Mod: 95 | Performed by: OCCUPATIONAL THERAPIST

## 2020-10-27 PROCEDURE — G0177 OPPS/PHP; TRAIN & EDUC SERV: HCPCS | Mod: 95

## 2020-10-27 PROCEDURE — 90853 GROUP PSYCHOTHERAPY: CPT | Mod: 95

## 2020-10-27 NOTE — GROUP NOTE
Telemedicine Visit: The patient's condition can be safely assessed and treated via synchronous audio and visual telemedicine encounter.      Reason for Telemedicine Visit: Services only offered telehealth    Originating Site (Patient Location): Patient's home    Distant Site (Provider Location): Provider Remote Setting    Consent:  The patient/guardian has verbally consented to: the potential risks and benefits of telemedicine (video visit) versus in person care; bill my insurance or make self-payment for services provided; and responsibility for payment of non-covered services.     Mode of Communication:  Video Conference via [employer]    As the provider I attest to compliance with applicable laws and regulations related to telemedicine.  Process Group Note    PATIENT'S NAME: Jammie Mariee  MRN:   [id number]  :   1965  ACCT. NUMBER: [id number]  DATE OF SERVICE: 10/27/20  START TIME: 10:00 AM  END TIME: 10:50 AM  FACILITATOR: Ermelinda Flynn LMFT  TOPIC:  Process Group    Diagnoses:  296.53 Bipolar I Disorder Current or Most Recent Episode Depressed, Severe    4. Other Diagnoses that is relevant to services:   300.00 (F41.9) Unspecified Anxiety Disorder       55+ Program  TRACK: a1    NUMBER OF PARTICIPANTS: 5          Data:    Session content: At the start of this group, patients were invited to check in by identifying themselves, describing their current emotional status, and identifying issues to address in this group.   Area(s) of treatment focus addressed in this session included Symptom Management.  Processed experiencing some relief the previous from depressed mood and low motivation. Reports she was able to push herself to complete some tasks around her house. Reports feeeling better when she is able to accomplish goals around the house. Identifies loneliness when being home alone and explored activities she can do that would be meaningful for her. No safety concerns.     Therapeutic  Interventions/Treatment Strategies:  Psychotherapist offered support, feedback and validation and reinforced use of skills. Treatment modalities used include Cognitive Behavioral Therapy. Interventions include Behavioral Activation: Explored how behaviors effect mood and interact with thoughts and feelings and Encouraged strategies to reduce individual procrastination and increase motivation by increasing goal-directed activities to enhance mood and reduce symptoms. and Coping Skills: Assisted patient in identifying 1-2 healthy distraction skills to reduce overall distress.    Assessment:    Patient response:   Patient responded to session by accepting feedback, giving feedback, listening and verbalizing understanding    Possible barriers to participation / learning include: and no barriers identified    Health Issues:   None reported       Substance Use Review:   Substance Use: No active concerns identified.    Mental Status/Behavioral Observations  Appearance:   Appropriate   Eye Contact:   Good   Psychomotor Behavior: Normal   Attitude:   Cooperative   Orientation:   All  Speech   Rate / Production: Normal    Volume:  Normal   Mood:    Depressed   Affect:    Worrisome   Thought Content:   Clear  Thought Form:  Coherent  Logical     Insight:    Fair     Plan:     Safety Plan: No current safety concerns identified.  Recommended that patient call 911 or go to the local ED should there be a change in any of these risk factors.     Barriers to treatment: None identified    Patient Contracts (see media tab):  None    Substance Use: Not addressed in session     Continue or Discharge: Patient will continue in 55+ Program (55+) as planned. Patient is likely to benefit from learning and using skills as they work toward the goals identified in their treatment plan.      ARLENE Savage  October 28, 2020

## 2020-10-27 NOTE — GROUP NOTE
Psychoeducation Group Note    PATIENT'S NAME: Jammie Mariee  MRN:   [id number]  :   1965  ACCT. NUMBER: [id number]  DATE OF SERVICE: 10/27/20  START TIME:  9:00 AM  END TIME:  9:50 AM  FACILITATOR: Alicia He RN  TOPIC: MH RN Group: Brain Health  55+ Program  TRACK: a1    NUMBER OF PARTICIPANTS: 5    Summary of Group / Topics Discussed:  Brain Health:  Pathophysiology of Mood Disorders: Patients were educated on mood disorder etiology and neuroscience, risk factors, symptoms, and pharmacologic, psychotherapeutic, and complementary treatment options. Patients were guided on a discussion of mental, behavioral, and physical symptoms and shared their symptoms with the group.     Patient Session Goals / Objectives:  ? Described what mood disorders are and identified risk factors   ? Explained how chemical imbalances in the brain can cause symptoms and how medications work to reverse this imbalance   ? Identified and described pharmacologic, psychotherapeutic, and complementary treatment options  Telemedicine Visit: The patient's condition can be safely assessed and treated via synchronous audio and visual telemedicine encounter.      Reason for Telemedicine Visit: Services only offered telehealth    Originating Site (Patient Location): Patient's home    Distant Site (Provider Location): Provider Remote Setting    Consent:  The patient/guardian has verbally consented to: the potential risks and benefits of telemedicine (video visit) versus in person care; bill my insurance or make self-payment for services provided; and responsibility for payment of non-covered services.     Mode of Communication:  Video Conference via [employer]    As the provider I attest to compliance with applicable laws and regulations related to telemedicine.       Patient Participation / Response:  Fully participated with the group by sharing personal reflections / insights and openly received / provided feedback with other  participants.    Demonstrated understanding of topics discussed through group discussion and participation    Treatment Plan:  Patient has a current master individualized treatment plan.  See Epic treatment plan for more information.    Alicia He RN

## 2020-10-27 NOTE — PROGRESS NOTES
"RN Review of Medical History / Physical Health Screen  Outpatient Mental Health Programs - Adult    55+ Program    PATIENT'S NAME: Jammie Mariee  MRN:   [id number]  :   1965  ACCT. NUMBER: [id number]  CURRENT AGE:  55 year old    DATE OF DIAGNOSTIC ASSESSMENT: 10/13/20  DATE OF ADMISSION: 10/15/20     Please see Diagnostic Assessment for additional Medical History.     General Health:   Have you had any exposure to any communicable disease in the past 2-3 weeks? no     Are you aware of safe sex practices? yes       Nutrition:    Are you on a special diet? If yes, please explain:  no   Do you have any concerns regarding your nutritional status? If yes, please explain:  No     Have you had any appetite changes in the last 3 months?  No     Have you had any weight loss or weight gain in the last 3 months?  No     Do you have a history of an eating disorder? no   Do you have a history of being in an eating disorder program? no     Patient height and weight recorded by RN in epic flowsheet: no    No; Unable to measure  Because of temporary in-person programmatic suspension due to COVID-19 pandemic, all pt weights and heights will be collected through patient self-report an recorded in physical health screening progress note upon admission to the program.                            Height/Weight Review:  Patient reported height:  5'2\"      Patient reports weight:  Date last checked:  115lb   Any referrals/needs identified?     no     BMI Review:  Was the patient informed of BMI? no      Findings No Intervention         Fall Risk:   Have you had any falls in the past 3 months? no     Do you currently use any assistive devices for mobility?   no      Additional Comments/Assessment: denies any outstandning medical concerns at this time    Per completion of the Medical History / Physical Health Screen, is there a recommendation to see / follow up with a primary care physician/clinic or dentist?    No.      Alicia " BETH He  10/27/2020

## 2020-10-27 NOTE — GROUP NOTE
Psychoeducation Group Note    PATIENT'S NAME: Jammie Mariee  MRN:   [id number]  :   1965  ACCT. NUMBER: [id number]  DATE OF SERVICE: 10/27/20  START TIME: 11:00 AM  END TIME: 11:50 AM  FACILITATOR: Roseanne Valdez OTR/L  TOPIC: MH Life Skills Group: Sensory Approaches in Mental Health  55+ Program  TRACK: 7B    NUMBER OF PARTICIPANTS: 6    Telemedicine Visit: The patient's condition can be safely assessed and treated via synchronous audio and visual telemedicine encounter.      Reason for Telemedicine Visit: Services only offered telehealth    Originating Site (Patient Location): Patient's home    Distant Site (Provider Location): [facility] [place] [address]    Consent:  The patient/guardian has verbally consented to: the potential risks and benefits of telemedicine (video visit) versus in person care; bill my insurance or make self-payment for services provided; and responsibility for payment of non-covered services.     Mode of Communication:  Video Conference via [employer]    As the provider I attest to compliance with applicable laws and regulations related to telemedicine.     Summary of Group / Topics Discussed:  Sensory Approaches in Mental Health:  Coping Through the Senses Introduction Part 2: Patients were introduced and taught about neurosensory based skills and strategies related to supporting effective self regulation skills.  Patients were taught about the interoceptive sensory systems and how interoceptive awareness can be used for coping with mental health symptoms and stressors.  Patients were provided with an experiential opportunity to increase self-awareness of helpful sensory input and self care activities. Patients were introduced on how to create supportive environments that encourage use of these skills.         Patient Session Goals / Objectives:    Identified specific and individualized neurosensory skills to help when distressed      Identified  skills learned and how this applies to current daily life    Established a plan for practice of these skills in their own environments        Patient Participation / Response:  Fully participated with the group by sharing personal reflections / insights and openly received / provided feedback with other participants.    Patient presentation: constricted affect; active in group discussion sharing personal examples/insights with the group, Verbalized understanding of content and Patient would benefit from additional opportunities to practice the content to be able to generalize it to their everyday life with increased intentionality, consistency, and efficacy in support of their psychiatric recovery    Treatment Plan:  Patient has a current master individualized treatment plan.  See Epic treatment plan for more information.    Roseanne Valdez, OTR/L

## 2020-10-29 ENCOUNTER — HOSPITAL ENCOUNTER (OUTPATIENT)
Dept: BEHAVIORAL HEALTH | Facility: CLINIC | Age: 55
End: 2020-10-29
Attending: PSYCHIATRY & NEUROLOGY
Payer: COMMERCIAL

## 2020-10-29 PROCEDURE — 90853 GROUP PSYCHOTHERAPY: CPT | Mod: 95

## 2020-10-29 PROCEDURE — G0177 OPPS/PHP; TRAIN & EDUC SERV: HCPCS | Mod: GT | Performed by: OCCUPATIONAL THERAPIST

## 2020-10-29 PROCEDURE — G0177 OPPS/PHP; TRAIN & EDUC SERV: HCPCS | Mod: 95

## 2020-10-29 NOTE — GROUP NOTE
Psychoeducation Group Note    PATIENT'S NAME: Jammie Mariee  MRN:   [id number]  :   1965  ACCT. NUMBER: [id number]  DATE OF SERVICE: 10/29/20  START TIME: 10:00 AM  END TIME: 10:50 AM  FACILITATOR: Roseanne Valdez OTR/L  TOPIC: MH Life Skills Group: Sensory Approaches in Mental Health  55+ [facility]  TRACK: A1    NUMBER OF PARTICIPANTS: 6    Telemedicine Visit: The patient's condition can be safely assessed and treated via synchronous audio and visual telemedicine encounter.      Reason for Telemedicine Visit: Services only offered telehealth    Originating Site (Patient Location): Patient's home    Distant Site (Provider Location): [facility] [place] [address]    Consent:  The patient/guardian has verbally consented to: the potential risks and benefits of telemedicine (video visit) versus in person care; bill my insurance or make self-payment for services provided; and responsibility for payment of non-covered services.     Mode of Communication:  Video Conference via [employer]    As the provider I attest to compliance with applicable laws and regulations related to telemedicine.     Summary of Group / Topics Discussed:  Sensory Approaches in Mental Health:  Sensory Modulation:  Patients were provided an opportunity to apply concepts taught in earlier groups this week in regards to identifying helpful and bothersome sensory input in different situations/environments.  Patients explored body based skills (bottom up processing skills) and techniques to help manage symptoms and stress and make changes in level of arousal when needed to be in control and comfortable so they are able to function in different environments.   Reinforced use of sensory based skills they are already using and/or have used in the past and explored some new sensory based coping skills they might try.     Patient Session Goals / Objectives:    Identified specific and individualized neurosensory skills  to help with engagement and participation in daily activities, routines, and roles    Identified signs and symptoms of current level of arousal and ways to make changes in level of arousal when needed    Established a plan for practice of these skills in their own environments        Patient Participation / Response:  Moderately participated, sharing some personal reflections / insights and adequately adequately received / provided feedback with other participants.    Patient presentation: constricted affect; active at times sharing examples with the group, Demonstrated understanding of content through identifying some helpful sensory input she uses such as music to support her participation in different activities  and Patient would benefit from additional opportunities to practice the content to be able to generalize it to their everyday life with increased intentionality, consistency, and efficacy in support of their psychiatric recovery    Treatment Plan:  Patient has a current master individualized treatment plan.  See Epic treatment plan for more information.    Roseanne Valdez, OTR/L

## 2020-10-29 NOTE — GROUP NOTE
Psychoeducation Group Note    PATIENT'S NAME: Jammie Mariee  MRN:   [id number]  :   1965  [id number]  DATE OF SERVICE: 10/29/20  START TIME: 11:00 AM  END TIME: 11:50 AM  FACILITATOR: Alicia He RN  TOPIC: MH RN Group: Brain Health  55+ Program  TRACK: a1    NUMBER OF PARTICIPANTS: 6    Summary of Group / Topics Discussed:  Brain Health:  Pathophysiology of Mood Disorders: Patients were educated on mood disorder etiology and neuroscience, risk factors, symptoms, and pharmacologic, psychotherapeutic, and complementary treatment options. Patients were guided on a discussion of mental, behavioral, and physical symptoms and shared their symptoms with the group.     Patient Session Goals / Objectives:  ? Described what mood disorders are and identified risk factors   ? Explained how chemical imbalances in the brain can cause symptoms and how medications work to reverse this imbalance   ? Identified and described pharmacologic, psychotherapeutic, and complementary treatment options  Telemedicine Visit: The patient's condition can be safely assessed and treated via synchronous audio and visual telemedicine encounter.      Reason for Telemedicine Visit: Services only offered telehealth    Originating Site (Patient Location): Patient's home    Distant Site (Provider Location): Provider Remote Setting    Consent:  The patient/guardian has verbally consented to: the potential risks and benefits of telemedicine (video visit) versus in person care; bill my insurance or make self-payment for services provided; and responsibility for payment of non-covered services.     Mode of Communication:  Video Conference via [employer]    As the provider I attest to compliance with applicable laws and regulations related to telemedicine.       Patient Participation / Response:  Fully participated with the group by sharing personal reflections / insights and openly received / provided feedback with other  participants.    Demonstrated understanding of topics discussed through group discussion and participation    Treatment Plan:  Patient has a current master individualized treatment plan.  See Epic treatment plan for more information.    Alicia He RN

## 2020-10-29 NOTE — GROUP NOTE
Telemedicine Visit: The patient's condition can be safely assessed and treated via synchronous audio and visual telemedicine encounter.      Reason for Telemedicine Visit: Services only offered telehealth    Originating Site (Patient Location): Patient's home    Distant Site (Provider Location): Provider Remote Setting    Consent:  The patient/guardian has verbally consented to: the potential risks and benefits of telemedicine (video visit) versus in person care; bill my insurance or make self-payment for services provided; and responsibility for payment of non-covered services.     Mode of Communication:  Video Conference via [employer]    As the provider I attest to compliance with applicable laws and regulations related to telemedicine.  Process Group Note    PATIENT'S NAME: Jammie Mareie  MRN:   [id number]  :   1965  ACCT. NUMBER: [id number]  DATE OF SERVICE: 10/29/20  START TIME:  9:00 AM  END TIME:  9:50 AM  FACILITATOR: Ermelinda Flynn LMFT  TOPIC:  Process Group    Diagnoses:  296.53 Bipolar I Disorder Current or Most Recent Episode Depressed, Severe    4. Other Diagnoses that is relevant to services:   300.00 (F41.9) Unspecified Anxiety Disorder       55+ Program  TRACK: A1    NUMBER OF PARTICIPANTS: 6          Data:    Session content: At the start of this group, patients were invited to check in by identifying themselves, describing their current emotional status, and identifying issues to address in this group.   Area(s) of treatment focus addressed in this session included Symptom Management.  Processed continued struggle with anxious thoughts and low motivation. Explored skills to use to reduce impact of depression on mood and ability to engage in meaningful activities. Reports she is meeting with her individual therapist later in the day and looking forward to appointment. Denies any safety concerns.     Therapeutic Interventions/Treatment Strategies:  Psychotherapist offered support,  feedback and validation and reinforced use of skills. Treatment modalities used include Cognitive Behavioral Therapy. Interventions include Behavioral Activation: Explored how behaviors effect mood and interact with thoughts and feelings.    Assessment:    Patient response:   Patient responded to session by accepting feedback, listening and verbalizing understanding    Possible barriers to participation / learning include: and no barriers identified    Health Issues:   None reported       Substance Use Review:   Substance Use: No active concerns identified.    Mental Status/Behavioral Observations  Appearance:   Appropriate   Eye Contact:   Good   Psychomotor Behavior: Normal   Attitude:   Cooperative   Orientation:   All  Speech   Rate / Production: Normal    Volume:  Normal   Mood:    Depressed   Affect:    Subdued  Worrisome   Thought Content:   Clear  Thought Form:  Coherent  Logical     Insight:    Fair     Plan:     Safety Plan: No current safety concerns identified.  Recommended that patient call 911 or go to the local ED should there be a change in any of these risk factors.     Barriers to treatment: None identified    Patient Contracts (see media tab):  None    Substance Use: Not addressed in session     Continue or Discharge: Patient will continue in 55+ Program (55+) as planned. Patient is likely to benefit from learning and using skills as they work toward the goals identified in their treatment plan.      ARLENE Savage  October 29, 2020

## 2020-11-02 ENCOUNTER — HOSPITAL ENCOUNTER (OUTPATIENT)
Dept: BEHAVIORAL HEALTH | Facility: CLINIC | Age: 55
End: 2020-11-02
Attending: PSYCHIATRY & NEUROLOGY
Payer: COMMERCIAL

## 2020-11-02 PROCEDURE — 90853 GROUP PSYCHOTHERAPY: CPT | Mod: GT

## 2020-11-02 PROCEDURE — G0177 OPPS/PHP; TRAIN & EDUC SERV: HCPCS | Mod: 95 | Performed by: OCCUPATIONAL THERAPIST

## 2020-11-02 NOTE — GROUP NOTE
Telemedicine Visit: The patient's condition can be safely assessed and treated via synchronous audio and visual telemedicine encounter.      Reason for Telemedicine Visit: Services only offered telehealth    Originating Site (Patient Location): Patient's home    Distant Site (Provider Location): Provider Remote Setting    Consent:  The patient/guardian has verbally consented to: the potential risks and benefits of telemedicine (video visit) versus in person care; bill my insurance or make self-payment for services provided; and responsibility for payment of non-covered services.     Mode of Communication:  Video Conference via [employer]    As the provider I attest to compliance with applicable laws and regulations related to telemedicine.  Process Group Note    PATIENT'S NAME: Jammie Mariee  MRN:   [id number]  :   1965  ACCT. NUMBER: [id number]  DATE OF SERVICE: 20  START TIME:  9:00 AM  END TIME:  9:50 AM  FACILITATOR: Ermelinda Flynn LMFT  TOPIC:  Process Group    Diagnoses:  296.53 Bipolar I Disorder Current or Most Recent Episode Depressed, Severe    4. Other Diagnoses that is relevant to services:   300.00 (F41.9) Unspecified Anxiety Disorder       55+ Program  TRACK: A1    NUMBER OF PARTICIPANTS: 6          Data:    Session content: At the start of this group, patients were invited to check in by identifying themselves, describing their current emotional status, and identifying issues to address in this group.   Area(s) of treatment focus addressed in this session included Symptom Management.  Processed struggling with anxiety and depressed mood over the weekend. Reports she had a panic attack and was unable to use skills to get through it. Explored ways to cope with panic attacks. Denies any safety concerns. Will spend time with daughter for support    Therapeutic Interventions/Treatment Strategies:  Psychotherapist offered support, feedback and validation and reinforced use of skills.  "Treatment modalities used include Cognitive Behavioral Therapy. Interventions include Coping Skills: Assisted patient in identifying 1-2 healthy distraction skills to reduce overall distress, Discussed how the use of intentional \"in the moment\" actions can help reduce distress and Assisted patient in understanding the purpose of planning / creating / participating / sharing in positive experiences.    Assessment:    Patient response:   Patient responded to session by accepting feedback, giving feedback, listening and verbalizing understanding    Possible barriers to participation / learning include: and no barriers identified    Health Issues:   None reported       Substance Use Review:   Substance Use: No active concerns identified.    Mental Status/Behavioral Observations  Appearance:   Appropriate   Eye Contact:   Good   Psychomotor Behavior: Normal   Attitude:   Cooperative   Orientation:   All  Speech   Rate / Production: Normal    Volume:  Normal   Mood:    Depressed   Affect:    Worrisome   Thought Content:   Clear  Thought Form:  Coherent  Logical     Insight:    Fair     Plan:     Safety Plan: No current safety concerns identified.  Recommended that patient call 911 or go to the local ED should there be a change in any of these risk factors.     Barriers to treatment: None identified    Patient Contracts (see media tab):  None    Substance Use: Not addressed in session     Continue or Discharge: Patient will continue in 55+ Program (55+) as planned. Patient is likely to benefit from learning and using skills as they work toward the goals identified in their treatment plan.      ARLENE Savage  November 2, 2020  "

## 2020-11-02 NOTE — GROUP NOTE
Psychoeducation Group Note    PATIENT'S NAME: Jammie Mariee  MRN:   [id number]  :   1965  ACCT. NUMBER: [id number]  DATE OF SERVICE: 20  START TIME: 10:00 AM  END TIME: 10:50 AM  FACILITATOR: Roseanne Valdez OTR/L  TOPIC: MH Life Skills Group: Lifestyle Balance and Structure  55+ Program  TRACK: A1    NUMBER OF PARTICIPANTS: 6    Telemedicine Visit: The patient's condition can be safely assessed and treated via synchronous audio and visual telemedicine encounter.      Reason for Telemedicine Visit: Services only offered telehealth    Originating Site (Patient Location): Patient's home    Distant Site (Provider Location): [facility]    Consent:  The patient/guardian has verbally consented to: the potential risks and benefits of telemedicine (video visit) versus in person care; bill my insurance or make self-payment for services provided; and responsibility for payment of non-covered services.     Mode of Communication:  Video Conference via [employer]    As the provider I attest to compliance with applicable laws and regulations related to telemedicine.     Summary of Group / Topics Discussed:  Lifestyle Balance and Strucure:  Routines, Habits, Rituals, and Roles: Patients were assisted to identify meaningful roles that they want to promote and the impact their mental health symptoms have on this.  Patients learned, applied, and generalized skills needed to live and participate in meaningful roles as effectively and independently as possible.  Patients developed awareness of strengths and challenges in fulfilling these roles and worked on integrating specific and individualized rituals and habits into their daily life.     Patient Session Goals / Objectives:    Improved awareness and engagement in life s meaningful roles, routines, habits, and rituals    Explored and identified current roles and challenges due to mental health symptoms     Identified  ways to establish and integrate daily self care and wellness routines and habits to support mental health recovery    Practiced and reflected on how to generalize taught skills to their everyday life        Patient Participation / Response:  Fully participated with the group by sharing personal reflections / insights and openly received / provided feedback with other participants.    Patient presentation: constricted affect; active in group discussion, Verbalized understanding of content and Patient would benefit from additional opportunities to practice the content to be able to generalize it to their everyday life with increased intentionality, consistency, and efficacy in support of their psychiatric recovery    Treatment Plan:  Patient has a current master individualized treatment plan.  See Epic treatment plan for more information.    Roseanne Valdez, OTR/L

## 2020-11-03 ENCOUNTER — HOSPITAL ENCOUNTER (OUTPATIENT)
Dept: BEHAVIORAL HEALTH | Facility: CLINIC | Age: 55
End: 2020-11-03
Attending: PSYCHIATRY & NEUROLOGY
Payer: COMMERCIAL

## 2020-11-03 PROCEDURE — G0177 OPPS/PHP; TRAIN & EDUC SERV: HCPCS | Mod: GT

## 2020-11-03 PROCEDURE — 90853 GROUP PSYCHOTHERAPY: CPT | Mod: GT

## 2020-11-03 PROCEDURE — G0177 OPPS/PHP; TRAIN & EDUC SERV: HCPCS | Mod: 95 | Performed by: OCCUPATIONAL THERAPIST

## 2020-11-03 NOTE — GROUP NOTE
Telemedicine Visit: The patient's condition can be safely assessed and treated via synchronous audio and visual telemedicine encounter.      Reason for Telemedicine Visit: Services only offered telehealth    Originating Site (Patient Location): Patient's home    Distant Site (Provider Location): Provider Remote Setting    Consent:  The patient/guardian has verbally consented to: the potential risks and benefits of telemedicine (video visit) versus in person care; bill my insurance or make self-payment for services provided; and responsibility for payment of non-covered services.     Mode of Communication:  Video Conference via [employer]    As the provider I attest to compliance with applicable laws and regulations related to telemedicine.  Process Group Note    PATIENT'S NAME: Jammie Mariee  MRN:   [id number]  :   1965  ACCT. NUMBER: [id number]  DATE OF SERVICE: 20  START TIME: 10:00 AM  END TIME: 10:50 AM  FACILITATOR: Ermelinda Flynn LMFT  TOPIC:  Process Group    Diagnoses:  296.53 Bipolar I Disorder Current or Most Recent Episode Depressed, Severe    4. Other Diagnoses that is relevant to services:   300.00 (F41.9) Unspecified Anxiety Disorder       55+ Program  TRACK: A1    NUMBER OF PARTICIPANTS: 8        Data:    Session content: At the start of this group, patients were invited to check in by identifying themselves, describing their current emotional status, and identifying issues to address in this group.   Area(s) of treatment focus addressed in this session included Symptom Management.  Processed struggling with anxiety and panic attacks. Explored skills to use to help cope in the moment and ways to distract. Identifies she struggles more when she is bored and explored ways to schedule meaningful activities into her day. Denies any safety concerns.     Therapeutic Interventions/Treatment Strategies:  Psychotherapist offered support, feedback and validation and reinforced use of skills.  "Treatment modalities used include Cognitive Behavioral Therapy. Interventions include Coping Skills: Assisted patient in identifying 1-2 healthy distraction skills to reduce overall distress and Discussed how the use of intentional \"in the moment\" actions can help reduce distress.    Assessment:    Patient response:   Patient responded to session by accepting feedback, listening and verbalizing understanding    Possible barriers to participation / learning include: and no barriers identified    Health Issues:   None reported       Substance Use Review:   Substance Use: No active concerns identified.    Mental Status/Behavioral Observations  Appearance:   Appropriate   Eye Contact:   Good   Psychomotor Behavior: Normal   Attitude:   Cooperative   Orientation:   All  Speech   Rate / Production: Normal    Volume:  Normal   Mood:    Anxious  Sad   Affect:    Worrisome   Thought Content:   Clear  Thought Form:  Coherent  Logical     Insight:    Poor     Plan:     Safety Plan: No current safety concerns identified.  Recommended that patient call 911 or go to the local ED should there be a change in any of these risk factors.     Barriers to treatment: None identified    Patient Contracts (see media tab):  None    Substance Use: Not addressed in session     Continue or Discharge: Patient will continue in 55+ Program (55+) as planned. Patient is likely to benefit from learning and using skills as they work toward the goals identified in their treatment plan.      ARLENE Savage  November 3, 2020  "

## 2020-11-03 NOTE — GROUP NOTE
Psychoeducation Group Note    PATIENT'S NAME: Jammie Mariee  MRN:   [id number]  :   1965  ACCT. NUMBER: [id number]  DATE OF SERVICE: 20  START TIME:  9:00 AM  END TIME:  9:50 AM  FACILITATOR: Alicia He RN  TOPIC: DELMAR RN Group: Mental Health Maintenance  55+ Program  TRACK: a1    NUMBER OF PARTICIPANTS: 8    Summary of Group / Topics Discussed:  Mental Health Maintenance:  Assessments of Strengths: Patients completed a self-reflection on personal strengths worksheet. The concept of personal strength as it relates to resilience were explored. Patients shared responses with the group and participated in discussion.     Patient Session Goals / Objectives:  ? Patients identified 1-3 qualities they consider a personal strength.  ? Patients understood the concept of personal strengths and the connection it has to resiliency  Telemedicine Visit: The patient's condition can be safely assessed and treated via synchronous audio and visual telemedicine encounter.      Reason for Telemedicine Visit: Services only offered telehealth    Originating Site (Patient Location): Patient's home    Distant Site (Provider Location): Provider Remote Setting    Consent:  The patient/guardian has verbally consented to: the potential risks and benefits of telemedicine (video visit) versus in person care; bill my insurance or make self-payment for services provided; and responsibility for payment of non-covered services.     Mode of Communication:  Video Conference via [email or web address]    As the provider I attest to compliance with applicable laws and regulations related to telemedicine.       Patient Participation / Response:  Fully participated with the group by sharing personal reflections / insights and openly received / provided feedback with other participants.    Demonstrated understanding of topics discussed through group discussion and participation    Treatment Plan:  Patient has a current master individualized  treatment plan.  See Epic treatment plan for more information.    Alicia He RN

## 2020-11-03 NOTE — GROUP NOTE
Psychoeducation Group Note    PATIENT'S NAME: Jammie Mariee  MRN:   [id number]  :   1965  ACCT. NUMBER: [id number]  DATE OF SERVICE: 20  START TIME: 11:00 AM  END TIME: 11:50 AM  FACILITATOR: Roseanne Valdez OTR/L  TOPIC: MH Life Skills Group: Resiliency Development  55+ Program  TRACK: A1    NUMBER OF PARTICIPANTS: 8    Telemedicine Visit: The patient's condition can be safely assessed and treated via synchronous audio and visual telemedicine encounter.      Reason for Telemedicine Visit: Services only offered telehealth    Originating Site (Patient Location): Patient's home    Distant Site (Provider Location): [facility]    Consent:  The patient/guardian has verbally consented to: the potential risks and benefits of telemedicine (video visit) versus in person care; bill my insurance or make self-payment for services provided; and responsibility for payment of non-covered services.     Mode of Communication:  Video Conference via [employer]    As the provider I attest to compliance with applicable laws and regulations related to telemedicine.     Summary of Group / Topics Discussed:  Resiliency Development:  Coping Skills: Aftercare Coping Cards: Patients were taught how to begin to develop a relapse management kit for both mental and substance use/abuse by creating individualized coping cards.  Focus was on identifying and creating a list of early warning signs and symptoms, coping skills to use, and support system options.     Patient Session Goals / Objectives:    Identified individualized coping skills they can use for effectively managing both mental health and substance abuse/abuse symptoms     Improved awareness of different types of coping skills and how to personalize them to their unique situation and circumstances      Established a plan for practice of these skills in their own environments    Practiced and reflected on how to generalize  taught skills to their everyday life        Patient Participation / Response:  Fully participated with the group by sharing personal reflections / insights and openly received / provided feedback with other participants.    Patient presentation: constricted affect; active in group discussion sharing personal insights and examples with the group, Verbalized understanding of content and Patient would benefit from additional opportunities to practice the content to be able to generalize it to their everyday life with increased intentionality, consistency, and efficacy in support of their psychiatric recovery    Treatment Plan:  Patient has a current master individualized treatment plan.  See Epic treatment plan for more information.    Roseanne Valdez, OTR/L

## 2020-11-05 ENCOUNTER — HOSPITAL ENCOUNTER (OUTPATIENT)
Dept: BEHAVIORAL HEALTH | Facility: CLINIC | Age: 55
End: 2020-11-05
Attending: PSYCHIATRY & NEUROLOGY
Payer: COMMERCIAL

## 2020-11-05 PROCEDURE — 90853 GROUP PSYCHOTHERAPY: CPT | Mod: GT

## 2020-11-05 PROCEDURE — G0177 OPPS/PHP; TRAIN & EDUC SERV: HCPCS | Mod: GT

## 2020-11-05 PROCEDURE — G0177 OPPS/PHP; TRAIN & EDUC SERV: HCPCS | Mod: 95 | Performed by: OCCUPATIONAL THERAPIST

## 2020-11-05 NOTE — GROUP NOTE
Telemedicine Visit: The patient's condition can be safely assessed and treated via synchronous audio and visual telemedicine encounter.      Reason for Telemedicine Visit: Services only offered telehealth    Originating Site (Patient Location): Patient's home    Distant Site (Provider Location): Provider Remote Setting    Consent:  The patient/guardian has verbally consented to: the potential risks and benefits of telemedicine (video visit) versus in person care; bill my insurance or make self-payment for services provided; and responsibility for payment of non-covered services.     Mode of Communication:  Video Conference via [employer]    As the provider I attest to compliance with applicable laws and regulations related to telemedicine.  Process Group Note    PATIENT'S NAME: Jammie Mariee  MRN:   [id number]  :   1965  ACCT. NUMBER: [id number]  DATE OF SERVICE: 20  START TIME:  9:00 AM  END TIME:  9:50 AM  FACILITATOR: Ermelinda Flynn LMFT  TOPIC:  Process Group    Diagnoses:  296.53 Bipolar I Disorder Current or Most Recent Episode Depressed, Severe    4. Other Diagnoses that is relevant to services:   300.00 (F41.9) Unspecified Anxiety Disorder       55+ Program  TRACK: A1    NUMBER OF PARTICIPANTS: 6          Data:    Session content: At the start of this group, patients were invited to check in by identifying themselves, describing their current emotional status, and identifying issues to address in this group.   Area(s) of treatment focus addressed in this session included Symptom Management.  Processed feeling anxious and struggling with motivation. Reports she continues to use skills to cope with anxiety and is going to be spending time with her daughter this evening and looking forward to time spent with her. Denies any safety concerns.     Therapeutic Interventions/Treatment Strategies:  Psychotherapist offered support, feedback and validation and reinforced use of skills. Treatment  modalities used include Cognitive Behavioral Therapy.    Assessment:    Patient response:   Patient responded to session by accepting feedback, listening and verbalizing understanding    Possible barriers to participation / learning include: and no barriers identified    Health Issues:   None reported       Substance Use Review:   Substance Use: No active concerns identified.    Mental Status/Behavioral Observations  Appearance:   Appropriate   Eye Contact:   Good   Psychomotor Behavior: Normal   Attitude:   Cooperative   Orientation:   All  Speech   Rate / Production: Normal    Volume:  Normal   Mood:    Depressed   Affect:    Worrisome   Thought Content:   Clear  Thought Form:  Coherent  Logical     Insight:    Fair     Plan:     Safety Plan: No current safety concerns identified.  Recommended that patient call 911 or go to the local ED should there be a change in any of these risk factors.     Barriers to treatment: None identified    Patient Contracts (see media tab):  None    Substance Use: Not addressed in session     Continue or Discharge: Patient will continue in 55+ Program (55+) as planned. Patient is likely to benefit from learning and using skills as they work toward the goals identified in their treatment plan.      ARLENE Savage  November 5, 2020

## 2020-11-05 NOTE — GROUP NOTE
Psychoeducation Group Note    PATIENT'S NAME: Jammie Mariee  MRN:   [id number]  :   1965  [id number]  DATE OF SERVICE: 20  START TIME: 11:00 AM  END TIME: 11:50 AM  FACILITATOR: Alicia He RN  TOPIC: MH RN Group: Brain Health  55+ Program  TRACK: a1    NUMBER OF PARTICIPANTS: 6    Summary of Group / Topics Discussed:  Brain Health:  Healthy aging: Patients were educated on understanding wellness concepts as we age and incorporating holistic and healthful habits may improve outlook and enjoyment of life. An open conversation among group member was facilitated to explore topics related to changes in life that aging brings and offer feedback and wisdom.  Group members were challenged to identify personal warning signs, members of their support system, safety considerations, wellness strategies, and personal interests/hobbies.    Patient Session Goals / Objectives:  ? Described the normal process of aging and discussed the expected changes that it brings within the group  ? Listed ideas for incorporating holistic healthy habits in daily routine  ? Identified important warning signs, member of support system, pertinent safety considerations, wellness strategies, and interest/hobbies   Telemedicine Visit: The patient's condition can be safely assessed and treated via synchronous audio and visual telemedicine encounter.      Reason for Telemedicine Visit: Services only offered telehealth    Originating Site (Patient Location): Patient's home    Distant Site (Provider Location): Provider Remote Setting    Consent:  The patient/guardian has verbally consented to: the potential risks and benefits of telemedicine (video visit) versus in person care; bill my insurance or make self-payment for services provided; and responsibility for payment of non-covered services.     Mode of Communication:  Video Conference via [employer]    As the provider I attest to compliance with applicable laws  and regulations related to telemedicine.       Patient Participation / Response:  Fully participated with the group by sharing personal reflections / insights and openly received / provided feedback with other participants.    Demonstrated understanding of topics discussed through group discussion and participation    Treatment Plan:  Patient has a current master individualized treatment plan.  See Epic treatment plan for more information.    Alicia He RN

## 2020-11-05 NOTE — GROUP NOTE
Psychoeducation Group Note    PATIENT'S NAME: Jammie Mariee  MRN:   [id number]  :   1965  ACCT. NUMBER: [id number]  DATE OF SERVICE: 20  START TIME: 10:00 AM  END TIME: 10:50 AM  FACILITATOR: Roseanne Valdez OTR/L  TOPIC: MH Life Skills Group: Sensory Approaches in Mental Health  55+ Program  TRACK: A1    NUMBER OF PARTICIPANTS: 6    Telemedicine Visit: The patient's condition can be safely assessed and treated via audio only today telemedicine encounter.      Reason for Telemedicine Visit: Services only offered telehealth    Originating Site (Patient Location): Patient's home    Distant Site (Provider Location): [facility] [place] [address]    Consent:  The patient/guardian has verbally consented to: the potential risks and benefits of telemedicine (video visit) versus in person care; bill my insurance or make self-payment for services provided; and responsibility for payment of non-covered services.     Mode of Communication:  Joined via audio only Conference via [employer]    As the provider I attest to compliance with applicable laws and regulations related to telemedicine.     Summary of Group / Topics Discussed:  Sensory Approaches in Mental Health:  Focused Activity: Patients were provided with verbal and experiential education to identify physical and sensorimotor based activities that can be utilized for stress management, self care, health maintenance, and self regulation.  Patients increased knowledge and awareness of activities that support good self care, build resiliency, and prevent relapse through healthy engagement in mindful focused activities for effective coping with illness and reduction of maladaptive coping skills.  Patients identified and then worked on a goal directed, focused task during the session and then reflected and reported on their experience to the group.    Patient Session Goals / Objectives:    Identified specific physical and  sensorimotor based activities for stress management, self care, health maintenance, and self regulation      Improved awareness of activities that are meaningful focused activities that support good self care, build resiliency, and prevent relapse and how this applies to current daily life    Established a plan for practice of these skills in their own environments    Practiced and reflected on how to generalize taught skills to their everyday life      Patient Participation / Response:  Fully participated with the group by sharing personal reflections / insights and openly received / provided feedback with other participants.    Patient presentation: only able to join via telephone today; active in group activity demonstrating adequate concentration and energy, Verbalized understanding of content and Patient would benefit from additional opportunities to practice the content to be able to generalize it to their everyday life with increased intentionality, consistency, and efficacy in support of their psychiatric recovery    Treatment Plan:  Patient has a current master individualized treatment plan.  See Epic treatment plan for more information.    Roseanne Valdez, OTR/L

## 2020-11-09 ENCOUNTER — HOSPITAL ENCOUNTER (OUTPATIENT)
Dept: BEHAVIORAL HEALTH | Facility: CLINIC | Age: 55
End: 2020-11-09
Attending: PSYCHIATRY & NEUROLOGY
Payer: COMMERCIAL

## 2020-11-09 PROCEDURE — 90853 GROUP PSYCHOTHERAPY: CPT | Mod: 95

## 2020-11-09 PROCEDURE — G0177 OPPS/PHP; TRAIN & EDUC SERV: HCPCS | Mod: 95 | Performed by: OCCUPATIONAL THERAPIST

## 2020-11-09 NOTE — GROUP NOTE
Psychoeducation Group Note    PATIENT'S NAME: Jammie Mariee  MRN:   [id number]  :   1965  ACCT. NUMBER: [id number]  DATE OF SERVICE: 20  START TIME: 10:00 AM  END TIME: 10:50 AM  FACILITATOR: Roseanne Valdez OTR/L  TOPIC: MH Life Skills Group: Sensory Approaches in Mental Health  55+ Program  TRACK: A1    NUMBER OF PARTICIPANTS: 7    Telemedicine Visit: The patient's condition can be safely assessed and treated via synchronous audio and visual telemedicine encounter.      Reason for Telemedicine Visit: Services only offered telehealth    Originating Site (Patient Location): Patient's home    Distant Site (Provider Location): [facility] [place] [address]    Consent:  The patient/guardian has verbally consented to: the potential risks and benefits of telemedicine (video visit) versus in person care; bill my insurance or make self-payment for services provided; and responsibility for payment of non-covered services.     Mode of Communication:  Video Conference via [employer]    As the provider I attest to compliance with applicable laws and regulations related to telemedicine.     Summary of Group / Topics Discussed:  Sensory Approaches in Mental Health:  Self Regulation Through Sensory Modulation:  Patients were provided with education on Autonomic Nervous System activation and taught skills that can be used immediately to help them calm down and regain self control.  Patients were taught how to recognize specific signs and symptoms of their individualized state of arousal and how to make changes when needed.  Patients explored body based skills (bottom up processing skills) and techniques to help manage symptoms and change level of arousal when needed to be in control and comfortable so they are able to function in different environments.       Patient Session Goals / Objectives:    Identified specific and individualized neurosensory skills to help when distressed and  for crisis management    Identified signs and symptoms of current level of arousal and ways to make changes in level of arousal when needed    Established a plan for practice of these skills in their own environments        Patient Participation / Response:  Fully participated with the group by sharing personal reflections / insights and openly received / provided feedback with other participants.    Patient presentation: constricted affect; shared some examples with the group and Patient would benefit from additional opportunities to practice the content to be able to generalize it to their everyday life with increased intentionality, consistency, and efficacy in support of their psychiatric recovery    Treatment Plan:  Patient has a current master individualized treatment plan.  See Epic treatment plan for more information.    Roseanne Valdez, OTR/L

## 2020-11-09 NOTE — GROUP NOTE
Telemedicine Visit: The patient's condition can be safely assessed and treated via synchronous audio and visual telemedicine encounter.      Reason for Telemedicine Visit: Services only offered telehealth    Originating Site (Patient Location): Patient's home    Distant Site (Provider Location): Provider Remote Setting    Consent:  The patient/guardian has verbally consented to: the potential risks and benefits of telemedicine (video visit) versus in person care; bill my insurance or make self-payment for services provided; and responsibility for payment of non-covered services.     Mode of Communication:  Video Conference via [employer]    As the provider I attest to compliance with applicable laws and regulations related to telemedicine.  Process Group Note    PATIENT'S NAME: Jammie Mariee  MRN:   [id number]  :   1965  ACCT. NUMBER: [id number]  DATE OF SERVICE: 20  START TIME:  9:00 AM  END TIME:  9:50 AM  FACILITATOR: Ermelinda Flynn LMFT  TOPIC:  Process Group    Diagnoses:  296.53 Bipolar I Disorder Current or Most Recent Episode Depressed, Severe    4. Other Diagnoses that is relevant to services:   300.00 (F41.9) Unspecified Anxiety Disorder       55+ Program  TRACK: A1    NUMBER OF PARTICIPANTS: 6          Data:    Session content: At the start of this group, patients were invited to check in by identifying themselves, describing their current emotional status, and identifying issues to address in this group.   Area(s) of treatment focus addressed in this session included Symptom Management.  Processed having a nice weekend enjoying time with friends & family. Reports she benefits from spending time with supports to distract form anxious thoughts. Explored ways she can create routine to her days that align with values and she has energy for. Denies any safety concerns.     Therapeutic Interventions/Treatment Strategies:  Psychotherapist offered support, feedback and validation and  reinforced use of skills. Treatment modalities used include Cognitive Behavioral Therapy. Interventions include Behavioral Activation: Explored how behaviors effect mood and interact with thoughts and feelings and Encouraged strategies to reduce individual procrastination and increase motivation by increasing goal-directed activities to enhance mood and reduce symptoms..    Assessment:    Patient response:   Patient responded to session by accepting feedback, listening and verbalizing understanding    Possible barriers to participation / learning include: and no barriers identified    Health Issues:   None reported       Substance Use Review:   Substance Use: No active concerns identified.    Mental Status/Behavioral Observations  Appearance:   Appropriate   Eye Contact:   Good   Psychomotor Behavior: Normal   Attitude:   Cooperative   Orientation:   All  Speech   Rate / Production: Normal    Volume:  Normal   Mood:    Depressed   Affect:    Worrisome   Thought Content:   Clear  Thought Form:  Coherent  Logical     Insight:    Fair     Plan:     Safety Plan: No current safety concerns identified.  Recommended that patient call 911 or go to the local ED should there be a change in any of these risk factors.     Barriers to treatment: None identified    Patient Contracts (see media tab):  None    Substance Use: Not addressed in session     Continue or Discharge: Patient will continue in 55+ Program (55+) as planned. Patient is likely to benefit from learning and using skills as they work toward the goals identified in their treatment plan.      ARLENE Savage  November 9, 2020

## 2020-11-09 NOTE — GROUP NOTE
Telemedicine Visit: The patient's condition can be safely assessed and treated via synchronous audio and visual telemedicine encounter.      Reason for Telemedicine Visit: Services only offered telehealth    Originating Site (Patient Location): Patient's home    Distant Site (Provider Location): Provider Remote Setting    Consent:  The patient/guardian has verbally consented to: the potential risks and benefits of telemedicine (video visit) versus in person care; bill my insurance or make self-payment for services provided; and responsibility for payment of non-covered services.     Mode of Communication:  Video Conference via [employer]    As the provider I attest to compliance with applicable laws and regulations related to telemedicine.  Psychotherapy Group Note    PATIENT'S NAME: Jammie Mariee  MRN:   [id number]  :   1965  ACCT. NUMBER: [id number]  DATE OF SERVICE: 20  START TIME: 11:00 AM  END TIME: 11:50 AM  FACILITATOR: Ermelinda Flynn LMFT  TOPIC:  EBP Group: Cognitive Restructuring  55+ Program  TRACK: A1    NUMBER OF PARTICIPANTS: 7    Summary of Group / Topics Discussed:  Cognitive Restructuring: Distortions: Patients received an overview of how to identify common cognitive distortions. Patients will explore alternatives to cognitive distortions and practice challenging their negative thought patterns. The goal is to help patients target modify ineffective thought patterns.     Patient Session Goals / Objectives:    Familiarized self with ineffective / unhealthy thoughts and how they develop.      Explored impact of ineffective thoughts / distortions on mood and activity    Formulated new neutral/positive alternatives to challenge less helpful / ineffective thoughts.    Practiced and plan to apply in daily life               Patient Participation / Response:  Moderately participated, sharing some personal reflections / insights and adequately adequately received / provided feedback with  other participants.    Demonstrated understanding of topics discussed through group discussion and participation, Expressed understanding of the relationship between behaviors, thoughts, and feelings and Demonstrated knowledge of personal thought patterns and how they impact their mood and behavior.    Treatment Plan:  Patient has a current master individualized treatment plan.  See Epic treatment plan for more information.    ARLENE Savage

## 2020-11-10 ENCOUNTER — HOSPITAL ENCOUNTER (OUTPATIENT)
Dept: BEHAVIORAL HEALTH | Facility: CLINIC | Age: 55
End: 2020-11-10
Attending: PSYCHIATRY & NEUROLOGY
Payer: COMMERCIAL

## 2020-11-10 PROCEDURE — 90853 GROUP PSYCHOTHERAPY: CPT | Mod: 95

## 2020-11-10 PROCEDURE — G0177 OPPS/PHP; TRAIN & EDUC SERV: HCPCS | Mod: GT | Performed by: OCCUPATIONAL THERAPIST

## 2020-11-10 PROCEDURE — G0177 OPPS/PHP; TRAIN & EDUC SERV: HCPCS | Mod: 95

## 2020-11-10 NOTE — GROUP NOTE
Telemedicine Visit: The patient's condition can be safely assessed and treated via synchronous audio and visual telemedicine encounter.      Reason for Telemedicine Visit: Services only offered telehealth    Originating Site (Patient Location): Patient's home    Distant Site (Provider Location): Provider Remote Setting    Consent:  The patient/guardian has verbally consented to: the potential risks and benefits of telemedicine (video visit) versus in person care; bill my insurance or make self-payment for services provided; and responsibility for payment of non-covered services.     Mode of Communication:  Video Conference via [employer]    As the provider I attest to compliance with applicable laws and regulations related to telemedicine.  Process Group Note    PATIENT'S NAME: Jammie Mariee  MRN:   [id number]  :   1965  ACCT. NUMBER: [id number]  DATE OF SERVICE: 11/10/20  START TIME: 10:00 AM  END TIME: 10:50 AM  FACILITATOR: Ermelinda Flynn LMFT  TOPIC:  Process Group    Diagnoses:  296.53 Bipolar I Disorder Current or Most Recent Episode Depressed, Severe    4. Other Diagnoses that is relevant to services:   300.00 (F41.9) Unspecified Anxiety Disorder       55+ Program  TRACK: A1    NUMBER OF PARTICIPANTS: 7          Data:    Session content: At the start of this group, patients were invited to check in by identifying themselves, describing their current emotional status, and identifying issues to address in this group.   Area(s) of treatment focus addressed in this session included Symptom Management.  Processed struggling the previous day while she was home alone. Reports feeling paralyzed by anxiety at times and not finding skills that are helpful. Explored ways to create a coping plan for high anxiety days. Denies any safety concerns.     Therapeutic Interventions/Treatment Strategies:  Psychotherapist offered support, feedback and validation and reinforced use of skills. Treatment modalities used  "include Cognitive Behavioral Therapy. Interventions include Coping Skills: Assisted patient in identifying 1-2 healthy distraction skills to reduce overall distress and Discussed how the use of intentional \"in the moment\" actions can help reduce distress.    Assessment:    Patient response:   Patient responded to session by accepting feedback, giving feedback, listening and verbalizing understanding    Possible barriers to participation / learning include: and no barriers identified    Health Issues:   None reported       Substance Use Review:   Substance Use: No active concerns identified.    Mental Status/Behavioral Observations  Appearance:   Appropriate   Eye Contact:   Good   Psychomotor Behavior: Normal   Attitude:   Cooperative   Orientation:   All  Speech   Rate / Production: Normal    Volume:  Normal   Mood:    Anxious   Affect:    Worrisome   Thought Content:   Clear  Thought Form:  Coherent  Logical     Insight:    Fair     Plan:     Safety Plan: No current safety concerns identified.  Recommended that patient call 911 or go to the local ED should there be a change in any of these risk factors.     Barriers to treatment: None identified    Patient Contracts (see media tab):  None    Substance Use: Not addressed in session     Continue or Discharge: Patient will continue in 55+ Program (55+) as planned. Patient is likely to benefit from learning and using skills as they work toward the goals identified in their treatment plan.      ARLENE Savage  November 10, 2020  "

## 2020-11-10 NOTE — GROUP NOTE
Psychoeducation Group Note    PATIENT'S NAME: Jammie Mariee  MRN:   [id number]  :   1965  ACCT. NUMBER: [id number]  DATE OF SERVICE: 11/10/20  START TIME:  9:00 AM  END TIME:  9:50 AM  FACILITATOR: Alicia He RN  TOPIC: DELMAR RN Group: Mental Health Maintenance  55+ Program  TRACK: a1    NUMBER OF PARTICIPANTS: 7    Summary of Group / Topics Discussed:  Mental Health Maintenance:  Stigma: In this group patients explored stigma surrounding a mental health diagnosis.  The group discussed the way stigma impacts their own life, and discussed strategies to reduce. The relationship between physical and mental health were also explored in the context of healthcare access, treatment, and support.    Patient Session Goals / Objectives:  ? Patients identified the importance of practicing emotional hygiene  ? Patients identified ways to decrease the  impact of stigma in their own life    Telemedicine Visit: The patient's condition can be safely assessed and treated via synchronous audio and visual telemedicine encounter.      Reason for Telemedicine Visit: Services only offered telehealth    Originating Site (Patient Location): Patient's home    Distant Site (Provider Location): Provider Remote Setting    Consent:  The patient/guardian has verbally consented to: the potential risks and benefits of telemedicine (video visit) versus in person care; bill my insurance or make self-payment for services provided; and responsibility for payment of non-covered services.     Mode of Communication:  Video Conference via [employer]    As the provider I attest to compliance with applicable laws and regulations related to telemedicine.       Patient Participation / Response:  Fully participated with the group by sharing personal reflections / insights and openly received / provided feedback with other participants.    Demonstrated understanding of topics discussed through group discussion and participation    Treatment  Plan:  Patient has a current master individualized treatment plan.  See Epic treatment plan for more information.    Alicia He RN

## 2020-11-10 NOTE — GROUP NOTE
Psychoeducation Group Note    PATIENT'S NAME: Jammie Mariee  MRN:   [id number]  :   1965  ACCT. NUMBER: [id number]  DATE OF SERVICE: 11/10/20  START TIME: 11:00 AM  END TIME: 11:50 AM  FACILITATOR: Roseanne Valdez OTR/L  TOPIC: MH Life Skills Group: Sensory Approaches in Mental Health  55+ Program  TRACK: A1    NUMBER OF PARTICIPANTS: 8    Telemedicine Visit: The patient's condition can be safely assessed and treated via synchronous audio and visual telemedicine encounter.      Reason for Telemedicine Visit: Services only offered telehealth    Originating Site (Patient Location): Patient's home    Distant Site (Provider Location): [facility] [place] [address]    Consent:  The patient/guardian has verbally consented to: the potential risks and benefits of telemedicine (video visit) versus in person care; bill my insurance or make self-payment for services provided; and responsibility for payment of non-covered services.     Mode of Communication:  Video Conference via [employer]    As the provider I attest to compliance with applicable laws and regulations related to telemedicine.     Summary of Group / Topics Discussed:  Sensory Approaches in Mental Health:  Self Regulation Through Sensory Modulation Application of Skills:  Patients were provided with education on Autonomic Nervous System activation and taught skills that can be used immediately to help them calm down and regain self control.  Patients were taught how to recognize specific signs and symptoms of their individualized state of arousal and how to make changes when needed.  Patients explored body based skills (bottom up processing skills) and techniques to help manage symptoms and change level of arousal when needed to be in control and comfortable so they are able to function in different environments.  Patients identified body based skills they can use to make changes in level of arousal in different  situations and environments both at home and in the community.       Patient Session Goals / Objectives:    Identified specific and individualized neurosensory skills to help when distressed and for crisis management    Identified signs and symptoms of current level of arousal and ways to make changes in level of arousal when needed    Established a plan for practice of these skills in their own environments        Patient Participation / Response:  Minimally participated, only when prompted / asked.    Patient presentation: quiet in group today; constricted affect and Patient would benefit from additional opportunities to practice the content to be able to generalize it to their everyday life with increased intentionality, consistency, and efficacy in support of their psychiatric recovery    Treatment Plan:  Patient has a current master individualized treatment plan.  See Epic treatment plan for more information.    Roseanne Valdez, OTR/L

## 2020-11-12 ENCOUNTER — HOSPITAL ENCOUNTER (OUTPATIENT)
Dept: BEHAVIORAL HEALTH | Facility: CLINIC | Age: 55
End: 2020-11-12
Attending: PSYCHIATRY & NEUROLOGY
Payer: COMMERCIAL

## 2020-11-12 PROCEDURE — G0177 OPPS/PHP; TRAIN & EDUC SERV: HCPCS | Mod: GT

## 2020-11-12 PROCEDURE — 90853 GROUP PSYCHOTHERAPY: CPT | Mod: GT

## 2020-11-12 PROCEDURE — G0177 OPPS/PHP; TRAIN & EDUC SERV: HCPCS | Mod: 95 | Performed by: OCCUPATIONAL THERAPIST

## 2020-11-12 NOTE — GROUP NOTE
Psychoeducation Group Note    PATIENT'S NAME: Jammie Mariee  MRN:   [id number]  :   1965  ACCT. NUMBER: [id number]  DATE OF SERVICE: 20  START TIME: 10:00 AM  END TIME: 10:50 AM  FACILITATOR: Roseanne Valdez OTR/L  TOPIC: MH Life Skills Group: Communication and Social Skills Development  55+ Program  TRACK: A1    NUMBER OF PARTICIPANTS: 7    Telemedicine Visit: The patient's condition can be safely assessed and treated via synchronous audio and visual telemedicine encounter.      Reason for Telemedicine Visit: Services only offered telehealth    Originating Site (Patient Location): Patient's home    Distant Site (Provider Location): [facility]    Consent:  The patient/guardian has verbally consented to: the potential risks and benefits of telemedicine (video visit) versus in person care; bill my insurance or make self-payment for services provided; and responsibility for payment of non-covered services.     Mode of Communication:  Video Conference via [employer]    As the provider I attest to compliance with applicable laws and regulations related to telemedicine.     Summary of Group / Topics Discussed:  Communication and Social Skills Development: Social Supports: Making Connections: Patients were taught about the importance of connecting with other people.  Patients gained awareness of their personal strengths and opportunities for growth in communicating with others.  Patients were given an opportunity to practice ways to improve their ability to connect with others in group session by sharing positive experiences with each other.        Patient Session Goals / Objectives:    Identified strengths and opportunities for growth in connecting with other people and how this impacts their ability to communicate clearly with other people       Improved awareness of important aspects of the need for connection with other people and how this relates to  mental health recovery        Established a plan for practice of these skills in their own environments    Practiced and reflected on how to generalize taught skills to their everyday life      Patient Participation / Response:  Fully participated with the group by sharing personal reflections / insights and openly received / provided feedback with other participants.    Patient presentation: constricted affect; reported depressed mood; active in group sharing personal experiences; seemed open to feedback from peers, Verbalized understanding of content and Patient would benefit from additional opportunities to practice the content to be able to generalize it to their everyday life with increased intentionality, consistency, and efficacy in support of their psychiatric recovery    Treatment Plan:  Patient has a current master individualized treatment plan.  See Epic treatment plan for more information.    Roseanne Valdez, OTR/L

## 2020-11-12 NOTE — GROUP NOTE
Telemedicine Visit: The patient's condition can be safely assessed and treated via synchronous audio and visual telemedicine encounter.      Reason for Telemedicine Visit: Services only offered telehealth    Originating Site (Patient Location): Patient's home    Distant Site (Provider Location): Provider Remote Setting    Consent:  The patient/guardian has verbally consented to: the potential risks and benefits of telemedicine (video visit) versus in person care; bill my insurance or make self-payment for services provided; and responsibility for payment of non-covered services.     Mode of Communication:  Video Conference via [employer]    As the provider I attest to compliance with applicable laws and regulations related to telemedicine.  Process Group Note    PATIENT'S NAME: Jammie Mariee  MRN:   [id number]  :   1965  ACCT. NUMBER: [id number]  DATE OF SERVICE: 20  START TIME:  9:00 AM  END TIME:  9:50 AM  FACILITATOR: Ermelinda Flynn LMFT  TOPIC:  Process Group    Diagnoses:  296.53 Bipolar I Disorder Current or Most Recent Episode Depressed, Severe    4. Other Diagnoses that is relevant to services:   300.00 (F41.9) Unspecified Anxiety Disorder       55+ Program  TRACK: A1    NUMBER OF PARTICIPANTS: 7          Data:    Session content: At the start of this group, patients were invited to check in by identifying themselves, describing their current emotional status, and identifying issues to address in this group.   Area(s) of treatment focus addressed in this session included Symptom Management.  Processed struggling with anxiety and panic attacks. Reports she uses deep breathing, listening to music and connecting with supports to cope with anxiety. Explored ways to connect with psychiatrist and track triggers of anxiety throughout the day. Denies any safety concerns.     Therapeutic Interventions/Treatment Strategies:  Psychotherapist offered support, feedback and validation and reinforced use  "of skills. Treatment modalities used include Cognitive Behavioral Therapy. Interventions include Coping Skills: Discussed use of self-soothe skills to decrease distress in the body, Assisted patient in identifying 1-2 healthy distraction skills to reduce overall distress and Discussed how the use of intentional \"in the moment\" actions can help reduce distress.    Assessment:    Patient response:   Patient responded to session by accepting feedback, giving feedback, listening and verbalizing understanding    Possible barriers to participation / learning include: and no barriers identified    Health Issues:   None reported       Substance Use Review:   Substance Use: No active concerns identified.    Mental Status/Behavioral Observations  Appearance:   Appropriate   Eye Contact:   Good   Psychomotor Behavior: Normal   Attitude:   Cooperative   Orientation:   All  Speech   Rate / Production: Normal    Volume:  Normal   Mood:    Anxious   Affect:    Worrisome   Thought Content:   Clear  Thought Form:  Coherent  Logical     Insight:    Poor     Plan:     Safety Plan: No current safety concerns identified.  Recommended that patient call 911 or go to the local ED should there be a change in any of these risk factors.     Barriers to treatment: None identified    Patient Contracts (see media tab):  None    Substance Use: Not addressed in session     Continue or Discharge: Patient will continue in 55+ Program (55+) as planned. Patient is likely to benefit from learning and using skills as they work toward the goals identified in their treatment plan.      ARLENE Savage  November 12, 2020  "

## 2020-11-13 NOTE — ADDENDUM NOTE
Encounter addended by: Johnie Grayson MD on: 11/13/2020 8:11 AM   Actions taken: Clinical Note Signed

## 2020-11-16 ENCOUNTER — HOSPITAL ENCOUNTER (OUTPATIENT)
Dept: BEHAVIORAL HEALTH | Facility: CLINIC | Age: 55
End: 2020-11-16
Attending: PSYCHIATRY & NEUROLOGY
Payer: COMMERCIAL

## 2020-11-16 PROCEDURE — 90853 GROUP PSYCHOTHERAPY: CPT | Mod: 95

## 2020-11-16 NOTE — GROUP NOTE
Telemedicine Visit: The patient's condition can be safely assessed and treated via synchronous audio and visual telemedicine encounter.      Reason for Telemedicine Visit: Services only offered telehealth    Originating Site (Patient Location): Patient's home    Distant Site (Provider Location): Provider Remote Setting    Consent:  The patient/guardian has verbally consented to: the potential risks and benefits of telemedicine (video visit) versus in person care; bill my insurance or make self-payment for services provided; and responsibility for payment of non-covered services.     Mode of Communication:  Video Conference via [employer]    As the provider I attest to compliance with applicable laws and regulations related to telemedicine.  Psychotherapy Group Note    PATIENT'S NAME: Jammie Mariee  MRN:   [id number]  :   1965  ACCT. NUMBER: [id number]  DATE OF SERVICE: 20  START TIME: 11:00 AM  END TIME: 11:50 AM  FACILITATOR: Ermelinda Flynn LMFT  TOPIC:  EBP Group: Self-Awareness  [facility] 55+ Program  TRACK: A1    NUMBER OF PARTICIPANTS: 7    Summary of Group / Topics Discussed:  Self-Awareness: Self-Compassion: Patients received overview of key concepts in developing self-compassion. Patients discussed mindfulness, self-kindness, and finding common humanity. Patients identified their current approach to problems in their lives and learned skills for increasing self-compassion. Patients identified ways they can put self-compassion skills into practice and problem solve barriers to application of skills.     Patient Session Goals / Objectives:    Nicholson components of self-compassion    Identify ways to practice self-compassion in daily life    Problem solve barriers to self-compassion practice      Patient Participation / Response:  Moderately participated, sharing some personal reflections / insights and adequately adequately received / provided feedback with other  participants.    Demonstrated understanding of topics discussed through group discussion and participation, Demonstrated understanding of values, strengths, and challenges to learn about themselves, Identified plan to address barriers to practicing skills that promote self-awareness  and Practiced skills in session    Treatment Plan:  Patient has a current master individualized treatment plan.  See Epic treatment plan for more information.    ARLENE Savage

## 2020-11-17 ENCOUNTER — HOSPITAL ENCOUNTER (OUTPATIENT)
Dept: BEHAVIORAL HEALTH | Facility: CLINIC | Age: 55
End: 2020-11-17
Attending: PSYCHIATRY & NEUROLOGY
Payer: COMMERCIAL

## 2020-11-17 PROCEDURE — G0177 OPPS/PHP; TRAIN & EDUC SERV: HCPCS | Mod: 95 | Performed by: OCCUPATIONAL THERAPIST

## 2020-11-17 PROCEDURE — G0177 OPPS/PHP; TRAIN & EDUC SERV: HCPCS | Mod: GT

## 2020-11-17 PROCEDURE — 90853 GROUP PSYCHOTHERAPY: CPT | Mod: 95

## 2020-11-17 NOTE — GROUP NOTE
Psychoeducation Group Note    PATIENT'S NAME: Jammie Mariee  MRN:   [id number]  :   1965  ACCT. NUMBER: [id number]  DATE OF SERVICE: 20  START TIME:  9:00 AM  END TIME:  9:50 AM  FACILITATOR: Alicia He RN  TOPIC: DELMAR RN Group: Mental Health Maintenance  [facility] 55+ Program  TRACK: a1    NUMBER OF PARTICIPANTS: 5    Summary of Group / Topics Discussed:  Mental Health Maintenance:  Assessments of Strengths: Patients completed a self-reflection on personal strengths worksheet. The concept of personal strength as it relates to resilience were explored. Patients shared responses with the group and participated in discussion.     Patient Session Goals / Objectives:  ? Patients identified 1-3 qualities they consider a personal strength.  ? Patients understood the concept of personal strengths and the connection it has to resiliency  Telemedicine Visit: The patient's condition can be safely assessed and treated via synchronous audio and visual telemedicine encounter.      Reason for Telemedicine Visit: Services only offered telehealth    Originating Site (Patient Location): Patient's home    Distant Site (Provider Location): Provider Remote Setting    Consent:  The patient/guardian has verbally consented to: the potential risks and benefits of telemedicine (video visit) versus in person care; bill my insurance or make self-payment for services provided; and responsibility for payment of non-covered services.     Mode of Communication:  Video Conference via [employer]    As the provider I attest to compliance with applicable laws and regulations related to telemedicine.       Patient Participation / Response:  Fully participated with the group by sharing personal reflections / insights and openly received / provided feedback with other participants.    Demonstrated understanding of topics discussed through group discussion and participation    Treatment Plan:  Patient has a current master  individualized treatment plan.  See Epic treatment plan for more information.    Alicia He RN

## 2020-11-17 NOTE — GROUP NOTE
Psychoeducation Group Note    PATIENT'S NAME: Jammie Mariee  MRN:   [id number]  :   1965  ACCT. NUMBER: [id number]  DATE OF SERVICE: 20  START TIME: 11:00 AM  END TIME: 11:50 AM  FACILITATOR: Roseanne Valdez OTR/L  TOPIC: MH Life Skills Group: Lifestyle Balance and Structure  [facility] 55+ Program  TRACK: A1    NUMBER OF PARTICIPANTS: 5    Telemedicine Visit: The patient's condition can be safely assessed and treated via synchronous audio and visual telemedicine encounter.      Reason for Telemedicine Visit: Services only offered telehealth    Originating Site (Patient Location): Patient's home    Distant Site (Provider Location): [facility] [place] [address]    Consent:  The patient/guardian has verbally consented to: the potential risks and benefits of telemedicine (video visit) versus in person care; bill my insurance or make self-payment for services provided; and responsibility for payment of non-covered services.     Mode of Communication:  Video Conference via Meusonic    As the provider I attest to compliance with applicable laws and regulations related to telemedicine.     Summary of Group / Topics Discussed:  Lifestyle Balance and Strucure:  Benefits of Leisure on Mental Health: Leisure Values & Resource Inventory: Patients explored and gained awareness of leisure values, benefits, and interests focusing on specific areas of self-development, self-enjoyment, self-support, and self-expenditure.   Patients identified specific activities and skills to employ for effective use of leisure for mental health management and quality of life.      Patient Session Goals / Objectives:    Increased awareness of the importance of engagement in leisure activities to support lifestyle balance and perceived quality of life    Identified specific leisure activities and skills to support mental health management      Facilitated exploration of meaningful leisure  interests and values    Practiced and reflected on how to generalize taught skills to their everyday life      Patient Participation / Response:  Fully participated with the group by sharing personal reflections / insights and openly received / provided feedback with other participants.    Patient presentation: constricted affect which brightened when talking about favorite leisure activities; active in group discussion; low mood observed, Verbalized understanding of content and Patient would benefit from additional opportunities to practice the content to be able to generalize it to their everyday life with increased intentionality, consistency, and efficacy in support of their psychiatric recovery    Treatment Plan:  Patient has a current master individualized treatment plan.  See Epic treatment plan for more information.    Roseanne Valdez, OTR/L

## 2020-11-17 NOTE — GROUP NOTE
Telemedicine Visit: The patient's condition can be safely assessed and treated via synchronous audio and visual telemedicine encounter.      Reason for Telemedicine Visit: Services only offered telehealth    Originating Site (Patient Location): Patient's home    Distant Site (Provider Location): Provider Remote Setting    Consent:  The patient/guardian has verbally consented to: the potential risks and benefits of telemedicine (video visit) versus in person care; bill my insurance or make self-payment for services provided; and responsibility for payment of non-covered services.     Mode of Communication:  Video Conference via Game Blisters    As the provider I attest to compliance with applicable laws and regulations related to telemedicine.  Process Group Note    PATIENT'S NAME: Jammie Mariee  MRN:   [id number]  :   1965  ACCT. NUMBER: [id number]  DATE OF SERVICE: 20  START TIME: 10:00 AM  END TIME: 10:50 AM  FACILITATOR: Ermelinda Flynn LMFT  TOPIC:  Process Group    Diagnoses:  296.53 Bipolar I Disorder Current or Most Recent Episode Depressed, Severe    4. Other Diagnoses that is relevant to services:   300.00 (F41.9) Unspecified Anxiety Disorder       [facility] 55+ Program  TRACK: A1    NUMBER OF PARTICIPANTS: 5          Data:    Session content: At the start of this group, patients were invited to check in by identifying themselves, describing their current emotional status, and identifying issues to address in this group.   Area(s) of treatment focus addressed in this session included Symptom Management.  Processed struggling with depression and anxiety. Feeling hopeless about her progress and reports that she feels overwhelmed most of the time. Reports she feels like she cant utilize skills in the moment because she is overwhelmed. Explored ways to take small steps in using skills and connecting with her psychiatrist about medications. No safety concerns.     Therapeutic  "Interventions/Treatment Strategies:  Psychotherapist offered support, feedback and validation and reinforced use of skills. Treatment modalities used include Cognitive Behavioral Therapy. Interventions include Coping Skills: Discussed use of self-soothe skills to decrease distress in the body, Assisted patient in identifying 1-2 healthy distraction skills to reduce overall distress and Discussed how the use of intentional \"in the moment\" actions can help reduce distress.    Assessment:    Patient response:   Patient responded to session by listening and verbalizing understanding    Possible barriers to participation / learning include: and no barriers identified    Health Issues:   None reported       Substance Use Review:   Substance Use: No active concerns identified.    Mental Status/Behavioral Observations  Appearance:   Appropriate   Eye Contact:   Good   Psychomotor Behavior: Normal   Attitude:   Cooperative   Orientation:   All  Speech   Rate / Production: Normal    Volume:  Normal   Mood:    Anxious   Affect:    Worrisome   Thought Content:   Clear  Thought Form:  Coherent  Logical     Insight:    Poor     Plan:     Safety Plan: No current safety concerns identified.  Recommended that patient call 911 or go to the local ED should there be a change in any of these risk factors.     Barriers to treatment: None identified    Patient Contracts (see media tab):  None    Substance Use: Not addressed in session     Continue or Discharge: Patient will continue in 55+ Program (55+) as planned. Patient is likely to benefit from learning and using skills as they work toward the goals identified in their treatment plan.      ARLENE Savage  November 17, 2020  "

## 2020-11-19 ENCOUNTER — HOSPITAL ENCOUNTER (OUTPATIENT)
Dept: BEHAVIORAL HEALTH | Facility: CLINIC | Age: 55
End: 2020-11-19
Attending: PSYCHIATRY & NEUROLOGY
Payer: COMMERCIAL

## 2020-11-19 PROCEDURE — 90853 GROUP PSYCHOTHERAPY: CPT | Mod: GT

## 2020-11-19 PROCEDURE — G0177 OPPS/PHP; TRAIN & EDUC SERV: HCPCS | Mod: GT | Performed by: OCCUPATIONAL THERAPIST

## 2020-11-19 PROCEDURE — G0177 OPPS/PHP; TRAIN & EDUC SERV: HCPCS | Mod: GT

## 2020-11-19 NOTE — GROUP NOTE
Psychoeducation Group Note    PATIENT'S NAME: Jammie Mariee  MRN:   [id number]  :   1965  ACCT. NUMBER: [id number]  DATE OF SERVICE: 20  START TIME: 10:00 AM  END TIME: 10:50 AM  FACILITATOR: Roseanne Valdez OTR/L  TOPIC: MH Life Skills Group: Lifestyle Balance and Structure  [facility] 55+ Program  TRACK: A1    NUMBER OF PARTICIPANTS: 7    Telemedicine Visit: The patient's condition can be safely assessed and treated via synchronous audio and visual telemedicine encounter.      Reason for Telemedicine Visit: Services only offered telehealth    Originating Site (Patient Location): Patient's home    Distant Site (Provider Location): [facility] [place] [address]    Consent:  The patient/guardian has verbally consented to: the potential risks and benefits of telemedicine (video visit) versus in person care; bill my insurance or make self-payment for services provided; and responsibility for payment of non-covered services.     Mode of Communication:  Video Conference via Dinamundo    As the provider I attest to compliance with applicable laws and regulations related to telemedicine.     Summary of Group / Topics Discussed:  Lifestyle Balance and Strucure:  Benefits of Leisure on Mental Health Experiential: Patients explored the benefits and possibilities of leisure activity to create lifestyle balance that supports their mental and physical wellbeing.  Patient engaged in an experiential leisure activity to gain self-awareness and build milieu social aspects and reflected on the impact the experiential activity had on their mood.       Patient Session Goals / Objectives:    Increased awareness of the importance of engagement in leisure activities to support lifestyle balance and perceived quality of life    Identified strategies to recognize and challenge barriers to leisure participation     Facilitated exploration of meaningful leisure interests and  values    Practiced and reflected on how to generalize taught skills to their everyday life        Patient Participation / Response:  Fully participated with the group by sharing personal reflections / insights and openly received / provided feedback with other participants.    Patient presentation: congruent affect; active in group activity; demonstrated good concentration, energy, and creative thinking, Verbalized understanding of content and Patient would benefit from additional opportunities to practice the content to be able to generalize it to their everyday life with increased intentionality, consistency, and efficacy in support of their psychiatric recovery    Treatment Plan:  Patient has a current master individualized treatment plan.  See Epic treatment plan for more information.    Roseanne Valdez, OTR/L

## 2020-11-19 NOTE — GROUP NOTE
Telemedicine Visit: The patient's condition can be safely assessed and treated via synchronous audio and visual telemedicine encounter.      Reason for Telemedicine Visit: Services only offered telehealth    Originating Site (Patient Location): Patient's home    Distant Site (Provider Location): Provider Remote Setting    Consent:  The patient/guardian has verbally consented to: the potential risks and benefits of telemedicine (video visit) versus in person care; bill my insurance or make self-payment for services provided; and responsibility for payment of non-covered services.     Mode of Communication:  Video Conference via LightningBuy    As the provider I attest to compliance with applicable laws and regulations related to telemedicine.  Process Group Note    PATIENT'S NAME: Jammie Mariee  MRN:   [id number]  :   1965  ACCT. NUMBER: [id number]  DATE OF SERVICE: 20  START TIME:  9:00 AM  END TIME:  9:55 AM  FACILITATOR: Ermelinda Flynn LMFT  TOPIC:  Process Group    Diagnoses:  296.53 Bipolar I Disorder Current or Most Recent Episode Depressed, Severe    4. Other Diagnoses that is relevant to services:   300.00 (F41.9) Unspecified Anxiety Disorder       [facility] 55+ Program  TRACK: A1    NUMBER OF PARTICIPANTS: 6          Data:    Session content: At the start of this group, patients were invited to check in by identifying themselves, describing their current emotional status, and identifying issues to address in this group.   Area(s) of treatment focus addressed in this session included Symptom Management.  Processed continued struggle with panic attacks and anxiety symptoms. Reports she continues to struggle to use skills or remember skills to use in the moment. Explored ways to write things down to use during times of panic. Reported she will see her psychiatrist to evaluate medication to support her mental health. Denies any safety concerns.     Therapeutic Interventions/Treatment  "Strategies:  Psychotherapist offered support, feedback and validation and reinforced use of skills. Treatment modalities used include Cognitive Behavioral Therapy. Interventions include Coping Skills: Discussed how the use of intentional \"in the moment\" actions can help reduce distress and Promoted understanding of how and when to apply grounding strategies to reduce distress and increase presence in the moment.    Assessment:    Patient response:   Patient responded to session by accepting feedback, giving feedback, listening and verbalizing understanding    Possible barriers to participation / learning include: and no barriers identified    Health Issues:   None reported       Substance Use Review:   Substance Use: No active concerns identified.    Mental Status/Behavioral Observations  Appearance:   Appropriate   Eye Contact:   Good   Psychomotor Behavior: Normal   Attitude:   Cooperative   Orientation:   All  Speech   Rate / Production: Normal    Volume:  Normal   Mood:    Anxious   Affect:    Worrisome   Thought Content:   Clear  Thought Form:  Coherent  Logical     Insight:    Fair     Plan:     Safety Plan: No current safety concerns identified.  Recommended that patient call 911 or go to the local ED should there be a change in any of these risk factors.     Barriers to treatment: None identified    Patient Contracts (see media tab):  None    Substance Use: Not addressed in session     Continue or Discharge: Patient will continue in 55+ Program (55+) as planned. Patient is likely to benefit from learning and using skills as they work toward the goals identified in their treatment plan.      ARLENE Savage  November 19, 2020  "

## 2020-11-19 NOTE — GROUP NOTE
Psychoeducation Group Note    PATIENT'S NAME: Jammie Mariee  MRN:   [id number]  :   1965  ACCT. NUMBER: [id number]  DATE OF SERVICE: 20  START TIME: 11:00 AM  END TIME: 11:50 AM  FACILITATOR: Gretchen Singh RN  TOPIC: MH RN Group: Mental Health Maintenance  Telemedicine Visit: The patient's condition can be safely assessed and treated via synchronous audio and visual telemedicine encounter.      Reason for Telemedicine Visit: covid19    Originating Site (Patient Location): Patient's home    Distant Site (Provider Location): Provider Remote Setting    Consent:  The patient/guardian has verbally consented to: the potential risks and benefits of telemedicine (video visit) versus in person care; bill my insurance or make self-payment for services provided; and responsibility for payment of non-covered services.     Mode of Communication:  Video Conference via Tigerlily    As the provider I attest to compliance with applicable laws and regulations related to telemedicine.       "[employer] 55+ Program  TRACK: A1    NUMBER OF PARTICIPANTS: 7    Summary of Group / Topics Discussed:  Mental Health Maintenance:  Vulnerability: In this group, the concept of vulnerability was explored through the viewing, discussion, and self-reflection of the Juju Barker Talk Titled,  The Power of Vulnerability.      Patient Session Goals / Objectives:  ? Defined and described definition of vulnerability   ? Identified 2 or more ways of practicing authenticity         Patient Participation / Response:  Fully participated with the group by sharing personal reflections / insights and openly received / provided feedback with other participants.    Demonstrated understanding of topics discussed through group discussion and participation and Identified / Expressed personal readiness to practice skills    Treatment Plan:  Patient has a current master individualized treatment plan.  See Epic treatment plan for more  information.    Gretchen Singh RN

## 2020-11-19 NOTE — ADDENDUM NOTE
Encounter addended by: Ermelinda Flynn LMFT on: 11/19/2020 3:27 PM   Actions taken: Flowsheet accepted

## 2020-11-23 ENCOUNTER — HOSPITAL ENCOUNTER (OUTPATIENT)
Dept: BEHAVIORAL HEALTH | Facility: CLINIC | Age: 55
End: 2020-11-23
Attending: PSYCHIATRY & NEUROLOGY
Payer: COMMERCIAL

## 2020-11-23 PROCEDURE — 90853 GROUP PSYCHOTHERAPY: CPT | Mod: GT

## 2020-11-23 PROCEDURE — G0177 OPPS/PHP; TRAIN & EDUC SERV: HCPCS | Mod: 95 | Performed by: OCCUPATIONAL THERAPIST

## 2020-11-23 PROCEDURE — 90853 GROUP PSYCHOTHERAPY: CPT | Mod: 95

## 2020-11-23 NOTE — GROUP NOTE
Psychoeducation Group Note    PATIENT'S NAME: Jammie Mariee  MRN:   [id number]  :   1965  ACCT. NUMBER: [id number]  DATE OF SERVICE: 20  START TIME: 10:00 AM  END TIME: 10:50 AM  FACILITATOR: Roseanne Valdez OTR/L  TOPIC: MH Life Skills Group: Communication and Social Skills Development  [facility] 55+ [facility]  TRACK: A1    NUMBER OF PARTICIPANTS: 7    Telemedicine Visit: The patient's condition can be safely assessed and treated via synchronous audio and visual telemedicine encounter.      Reason for Telemedicine Visit: Services only offered telehealth    Originating Site (Patient Location): Patient's home    Distant Site (Provider Location): [facility] [place] [address]    Consent:  The patient/guardian has verbally consented to: the potential risks and benefits of telemedicine (video visit) versus in person care; bill my insurance or make self-payment for services provided; and responsibility for payment of non-covered services.     Mode of Communication:  Video Conference via DanceJam    As the provider I attest to compliance with applicable laws and regulations related to telemedicine.     Summary of Group / Topics Discussed:  Communication and Social Skills Development: Social Supports: Reaching Out: Patients were taught and gained awareness of the importance of asking for help from other people as a way to expand their support network.  Patients identified both personal strengths and barriers in asking for help.  Patients were provided with skills and opportunity to practice asking for help to improve overall communication and connection with other people.      Patient Session Goals / Objectives:    Identified strengths and opportunities for growth in asking for help and how this impacts their ability to clearly communicate needs to other people       Improved awareness of important aspects of asking for help and support and how this relates to mental  health recovery        Established a plan for practice of these skills in their own environments    Practiced and reflected on how to generalize taught skills to their everyday life        Patient Participation / Response:  Minimally participated, only when prompted / asked.    Patient presentation: generally quiet in group today; blunted affect; appeared to listen to the discussion and Patient would benefit from additional opportunities to practice the content to be able to generalize it to their everyday life with increased intentionality, consistency, and efficacy in support of their psychiatric recovery    Treatment Plan:  Patient has a current master individualized treatment plan.  See Epic treatment plan for more information.    Roseanne Valdez, OTR/L

## 2020-11-23 NOTE — GROUP NOTE
Telemedicine Visit: The patient's condition can be safely assessed and treated via synchronous audio and visual telemedicine encounter.      Reason for Telemedicine Visit: Services only offered telehealth    Originating Site (Patient Location): Patient's home    Distant Site (Provider Location): Provider Remote Setting    Consent:  The patient/guardian has verbally consented to: the potential risks and benefits of telemedicine (video visit) versus in person care; bill my insurance or make self-payment for services provided; and responsibility for payment of non-covered services.     Mode of Communication:  Video Conference via Run3D    As the provider I attest to compliance with applicable laws and regulations related to telemedicine.  Psychotherapy Group Note    PATIENT'S NAME: Jammie Mariee  MRN:   [id number]  :   1965  ACCT. NUMBER: [id number]  DATE OF SERVICE: 20  START TIME: 11:00 AM  END TIME: 11:50 AM  FACILITATOR: Ermelinda Flynn LMFT  TOPIC:  EBP Group: Emotions Management  [place] [address]+ Program  TRACK: A1    NUMBER OF PARTICIPANTS: 7    Summary of Group / Topics Discussed:  Emotions Management: Understanding Emotions: Patients discussed the purpose of emotions and function they serve in our lives.  Reviewed core emotions, why they happen (triggers), and how they occur. The group assisted one anothers' understanding that: emotional experiences are important; difficult emotions have a place in our lives; and the differences between various emotions.    Patient Session Goals / Objectives:    Demonstrate understanding of types various emotions    Identify and discuss specific emotions and when they occur; understand triggers    Discuss barriers to emotional regulation      Patient Participation / Response:  Moderately participated, sharing some personal reflections / insights and adequately adequately received / provided feedback with other participants.    Demonstrated  understanding of topics discussed through group discussion and participation, Expressed understanding of the relevance / importance of emotions management skills at distressing times in life, Demonstrated knowledge of when to consider applying a variety of emotions management skills in daily life and Demonstrated understanding and practice strategies to manage difficult emotions and move towards healing    Treatment Plan:  Patient has a current master individualized treatment plan.  See Epic treatment plan for more information.    Ermelinda Flynn LMFT

## 2020-11-23 NOTE — GROUP NOTE
Telemedicine Visit: The patient's condition can be safely assessed and treated via synchronous audio and visual telemedicine encounter.      Reason for Telemedicine Visit: Services only offered telehealth    Originating Site (Patient Location): Patient's home    Distant Site (Provider Location): Provider Remote Setting    Consent:  The patient/guardian has verbally consented to: the potential risks and benefits of telemedicine (video visit) versus in person care; bill my insurance or make self-payment for services provided; and responsibility for payment of non-covered services.     Mode of Communication:  Video Conference via Imaginova    As the provider I attest to compliance with applicable laws and regulations related to telemedicine.  Process Group Note    PATIENT'S NAME: Jammie Mariee  MRN:   [id number]  :   1965  ACCT. NUMBER: [id number]  DATE OF SERVICE: 20  START TIME:  9:00 AM  END TIME:  9:50 AM  FACILITATOR: Ermelinda Flynn LMFT  TOPIC:  Process Group    Diagnoses:  296.53 Bipolar I Disorder Current or Most Recent Episode Depressed, Severe    4. Other Diagnoses that is relevant to services:   300.00 (F41.9) Unspecified Anxiety Disorder       [facility] 55+ Program  TRACK: A1    NUMBER OF PARTICIPANTS: 8          Data:    Session content: At the start of this group, patients were invited to check in by identifying themselves, describing their current emotional status, and identifying issues to address in this group.   Area(s) of treatment focus addressed in this session included Symptom Management.  Processed continued feeling anxious and tired from feeling anxious. Reports she met with psychiatrist and had some med changes that she is confused about. Explored ways to use grounding skills when feeling anxious and taking smaller steps when attempting to complete goals.     Therapeutic Interventions/Treatment Strategies:  Psychotherapist offered support, feedback and validation and  "reinforced use of skills. Treatment modalities used include Cognitive Behavioral Therapy. Interventions include Coping Skills: Discussed how the use of intentional \"in the moment\" actions can help reduce distress and Promoted understanding of how and when to apply grounding strategies to reduce distress and increase presence in the moment.    Assessment:    Patient response:   Patient responded to session by accepting feedback, listening and verbalizing understanding    Possible barriers to participation / learning include: and no barriers identified    Health Issues:   None reported       Substance Use Review:   Substance Use: No active concerns identified.    Mental Status/Behavioral Observations  Appearance:   Appropriate   Eye Contact:   Good   Psychomotor Behavior: Normal   Attitude:   Cooperative   Orientation:   All  Speech   Rate / Production: Normal    Volume:  Normal   Mood:    Anxious   Affect:    Worrisome   Thought Content:   Clear  Thought Form:  Coherent  Logical     Insight:    Poor     Plan:     Safety Plan: No current safety concerns identified.  Recommended that patient call 911 or go to the local ED should there be a change in any of these risk factors.     Barriers to treatment: None identified    Patient Contracts (see media tab):  None    Substance Use: Not addressed in session     Continue or Discharge: Patient will continue in 55+ Program (55+) as planned. Patient is likely to benefit from learning and using skills as they work toward the goals identified in their treatment plan.      ARLENE Savage  November 23, 2020  "

## 2020-11-24 ENCOUNTER — HOSPITAL ENCOUNTER (OUTPATIENT)
Dept: BEHAVIORAL HEALTH | Facility: CLINIC | Age: 55
End: 2020-11-24
Attending: PSYCHIATRY & NEUROLOGY
Payer: COMMERCIAL

## 2020-11-24 PROCEDURE — 90853 GROUP PSYCHOTHERAPY: CPT | Mod: GT

## 2020-11-24 PROCEDURE — G0177 OPPS/PHP; TRAIN & EDUC SERV: HCPCS | Mod: GT | Performed by: OCCUPATIONAL THERAPIST

## 2020-11-24 PROCEDURE — G0177 OPPS/PHP; TRAIN & EDUC SERV: HCPCS | Mod: 95

## 2020-11-24 NOTE — GROUP NOTE
Telemedicine Visit: The patient's condition can be safely assessed and treated via synchronous audio and visual telemedicine encounter.      Reason for Telemedicine Visit: Services only offered telehealth    Originating Site (Patient Location): Patient's home    Distant Site (Provider Location): Provider Remote Setting    Consent:  The patient/guardian has verbally consented to: the potential risks and benefits of telemedicine (video visit) versus in person care; bill my insurance or make self-payment for services provided; and responsibility for payment of non-covered services.     Mode of Communication:  Video Conference via CarbonFlow    As the provider I attest to compliance with applicable laws and regulations related to telemedicine.  Process Group Note    PATIENT'S NAME: Jammie Mariee  MRN:   [id number]  :   1965  ACCT. NUMBER: [id number]  DATE OF SERVICE: 20  START TIME: 10:00 AM  END TIME: 10:50 AM  FACILITATOR: Ermelinda Flynn LMFT  TOPIC:  Process Group    Diagnoses:  296.53 Bipolar I Disorder Current or Most Recent Episode Depressed, Severe    4. Other Diagnoses that is relevant to services:   300.00 (F41.9) Unspecified Anxiety Disorder       [facility] 55+ Program  TRACK: A1    NUMBER OF PARTICIPANTS: 7          Data:    Session content: At the start of this group, patients were invited to check in by identifying themselves, describing their current emotional status, and identifying issues to address in this group.   Area(s) of treatment focus addressed in this session included Symptom Management.  Processed continued struggle with anxiety. Looking forward to her daughter spending time with her because she feels less afraid and lonely when her daughter is with her. Reports being able to ask for support around skills when anxiety increases and can manage panic attacks better. Reports she will practice deep breathing and grounding skills when anxiety arised. No safety concerns.  "    Therapeutic Interventions/Treatment Strategies:  Psychotherapist offered support, feedback and validation and reinforced use of skills. Treatment modalities used include Cognitive Behavioral Therapy. Interventions include Coping Skills: Discussed use of self-soothe skills to decrease distress in the body, Discussed how the use of intentional \"in the moment\" actions can help reduce distress and Facilitated understanding of  what factors may contribute to symptom relapse and skills plan to manage symptom relapse .    Assessment:    Patient response:   Patient responded to session by accepting feedback, listening and verbalizing understanding    Possible barriers to participation / learning include: and no barriers identified    Health Issues:   None reported       Substance Use Review:   Substance Use: No active concerns identified.    Mental Status/Behavioral Observations  Appearance:   Appropriate   Eye Contact:   Good   Psychomotor Behavior: Normal   Attitude:   Cooperative   Orientation:   All  Speech   Rate / Production: Normal    Volume:  Normal   Mood:    Anxious   Affect:    Worrisome   Thought Content:   Clear  Thought Form:  Coherent  Logical     Insight:    Poor     Plan:     Safety Plan: No current safety concerns identified.  Recommended that patient call 911 or go to the local ED should there be a change in any of these risk factors.     Barriers to treatment: None identified    Patient Contracts (see media tab):  None    Substance Use: Not addressed in session     Continue or Discharge: Patient will continue in 55+ Program (55+) as planned. Patient is likely to benefit from learning and using skills as they work toward the goals identified in their treatment plan.      ARLENE Savage  November 24, 2020  "

## 2020-11-24 NOTE — GROUP NOTE
Psychoeducation Group Note    PATIENT'S NAME: Jammie Mariee  MRN:   [id number]  :   1965  ACCT. NUMBER: [id number]  DATE OF SERVICE: 20  START TIME:  9:00 AM  END TIME:  9:50 AM  FACILITATOR: Gretchen Singh RN  TOPIC: DELMAR RN Group: [facility]  Telemedicine Visit: The patient's condition can be safely assessed and treated via synchronous audio and visual telemedicine encounter.      Reason for Telemedicine Visit:  covid19    Originating Site (Patient Location): Patient's home    Distant Site (Provider Location): Provider Remote Setting    Consent:  The patient/guardian has verbally consented to: the potential risks and benefits of telemedicine (video visit) versus in person care; bill my insurance or make self-payment for services provided; and responsibility for payment of non-covered services.     Mode of Communication:  Video Conference via MeetBall    As the provider I attest to compliance with applicable laws and regulations related to telemedicine.       [employer] 55+ Program  TRACK: A1    NUMBER OF PARTICIPANTS: 7    Summary of Group / Topics Discussed:  Foundations of Health: Nutrition: Macronutrients: Patient were provided education on major macronutrients, their role in the body, and why it is important to meet daily nutritional needs. Obstacles to meeting daily nutritional needs were identified in group discussion and strategies to promote improved nutrition were explored. Macronutrients discussed include: carbohydrates, proteins and amino acids, fats, fiber, and water.    Also discussed diets that are healthy for the brain, touching on neurotransmitters.     Patient Session Goals / Objectives:  ? Discussed the role of diet on mood, physical health, energy level, and the development of chronic disease.  ? Identified daily nutritional needs recommended by the [employer] via My [employer] education resources  ? Developing increased health literacy skills in finding credible  nutrition information from reliable sources        Patient Participation / Response:  Fully participated with the group by sharing personal reflections / insights and openly received / provided feedback with other participants.    Demonstrated understanding of topics discussed through group discussion and participation and Identified / Expressed personal readiness to practice skills    Treatment Plan:  Patient has a current master individualized treatment plan.  See Epic treatment plan for more information.    Gretchen Singh RN

## 2020-11-24 NOTE — GROUP NOTE
Psychoeducation Group Note    PATIENT'S NAME: Jammie Mariee  MRN:   [id number]  :   1965  ACCT. NUMBER: [id number]  DATE OF SERVICE: 20  START TIME: 11:00 AM  END TIME: 11:50 AM  FACILITATOR: Roseanne Valdez OTR/L  TOPIC: MH Life Skills Group: Sensory Approaches in Mental Health  [facility] 55+ [facility]  TRACK: A1    NUMBER OF PARTICIPANTS: 6    Telemedicine Visit: The patient's condition can be safely assessed and treated via synchronous audio and visual telemedicine encounter.      Reason for Telemedicine Visit: Services only offered telehealth    Originating Site (Patient Location): Patient's home    Distant Site (Provider Location): [facility] [place] [place] [address]    Consent:  The patient/guardian has verbally consented to: the potential risks and benefits of telemedicine (video visit) versus in person care; bill my insurance or make self-payment for services provided; and responsibility for payment of non-covered services.     Mode of Communication:  Video Conference via [employer]    As the provider I attest to compliance with applicable laws and regulations related to telemedicine.     Summary of Group / Topics Discussed:  Sensory Approaches in Mental Health:  Focused Activity: Patients were provided with verbal and experiential education to identify physical and sensorimotor based activities that can be utilized for stress management, self care, health maintenance, and self regulation.  Patients increased knowledge and awareness of activities that support good self care, build resiliency, and prevent relapse through healthy engagement in mindful focused activities for effective coping with illness and reduction of maladaptive coping skills.  Lead patients in a simple seated stretching program during the group for regulation and body based self awareness.     Patient Session Goals / Objectives:    Identified specific physical and sensorimotor based  activities for stress management, self care, health maintenance, and self regulation      Improved awareness of activities that are meaningful focused activities that support good self care, build resiliency, and prevent relapse and how this applies to current daily life    Established a plan for practice of these skills in their own environments    Practiced and reflected on how to generalize taught skills to their everyday life      Patient Participation / Response:  Fully participated with the group by sharing personal reflections / insights and openly received / provided feedback with other participants.    Patient presentation: constricted affect; active in group activity (encouraged her to rest/modify movements as needed) and Patient would benefit from additional opportunities to practice the content to be able to generalize it to their everyday life with increased intentionality, consistency, and efficacy in support of their psychiatric recovery    Treatment Plan:  Patient has a current master individualized treatment plan.  See Epic treatment plan for more information.    Roseanne Valdez, OTR/L

## 2020-11-25 ENCOUNTER — TELEPHONE (OUTPATIENT)
Dept: BEHAVIORAL HEALTH | Facility: CLINIC | Age: 55
End: 2020-11-25

## 2020-11-30 ENCOUNTER — HOSPITAL ENCOUNTER (OUTPATIENT)
Dept: BEHAVIORAL HEALTH | Facility: CLINIC | Age: 55
End: 2020-11-30
Attending: PSYCHIATRY & NEUROLOGY
Payer: COMMERCIAL

## 2020-11-30 PROCEDURE — 90853 GROUP PSYCHOTHERAPY: CPT | Mod: 95

## 2020-11-30 PROCEDURE — G0177 OPPS/PHP; TRAIN & EDUC SERV: HCPCS | Mod: GT | Performed by: OCCUPATIONAL THERAPIST

## 2020-11-30 NOTE — GROUP NOTE
Telemedicine Visit: The patient's condition can be safely assessed and treated via synchronous audio and visual telemedicine encounter.      Reason for Telemedicine Visit: Services only offered telehealth    Originating Site (Patient Location): Patient's home    Distant Site (Provider Location): Provider Remote Setting    Consent:  The patient/guardian has verbally consented to: the potential risks and benefits of telemedicine (video visit) versus in person care; bill my insurance or make self-payment for services provided; and responsibility for payment of non-covered services.     Mode of Communication:  Video Conference via Trubates    As the provider I attest to compliance with applicable laws and regulations related to telemedicine.  Process Group Note    PATIENT'S NAME: Jammie Mariee  MRN:   [id number]  :   1965  ACCT. NUMBER: [id number]  DATE OF SERVICE: 20  START TIME:  9:00 AM  END TIME:  9:50 AM  FACILITATOR: Ermelinda Flynn LMFT  TOPIC:  Process Group    Diagnoses:  296.53 Bipolar I Disorder Current or Most Recent Episode Depressed, Severe    4. Other Diagnoses that is relevant to services:   300.00 (F41.9) Unspecified Anxiety Disorder       [facility] 55+ Program  TRACK: A1    NUMBER OF PARTICIPANTS: 7          Data:    Session content: At the start of this group, patients were invited to check in by identifying themselves, describing their current emotional status, and identifying issues to address in this group.   Area(s) of treatment focus addressed in this session included Symptom Management.  Processed continued struggled with fatigue and anxiety. Reports she experienced anxiety before spending time with her family over the holiday and was able to ask for support from family. Explored ways to cope with fatigue and anxiety attack with distraction & grounding. Reports daughter will be staying with her for support this week. No safety concerns.     Therapeutic  "Interventions/Treatment Strategies:  Psychotherapist offered support, feedback and validation and reinforced use of skills. Treatment modalities used include Cognitive Behavioral Therapy. Interventions include Coping Skills: Discussed how the use of intentional \"in the moment\" actions can help reduce distress, Reviewed patients current calming practices and discussed a more formal way of practicing and accessing skills and Promoted understanding of how and when to apply grounding strategies to reduce distress and increase presence in the moment.    Assessment:    Patient response:   Patient responded to session by accepting feedback, giving feedback, listening and verbalizing understanding    Possible barriers to participation / learning include: and no barriers identified    Health Issues:   None reported       Substance Use Review:   Substance Use: No active concerns identified.    Mental Status/Behavioral Observations  Appearance:   Appropriate   Eye Contact:   Good   Psychomotor Behavior: Normal   Attitude:   Cooperative   Orientation:   All  Speech   Rate / Production: Normal    Volume:  Normal   Mood:    Anxious   Affect:    Worrisome   Thought Content:   Clear  Thought Form:  Coherent  Logical     Insight:    Fair     Plan:     Safety Plan: No current safety concerns identified.  Recommended that patient call 911 or go to the local ED should there be a change in any of these risk factors.     Barriers to treatment: None identified    Patient Contracts (see media tab):  None    Substance Use: Not addressed in session     Continue or Discharge: Patient will continue in 55+ Program (55+) as planned. Patient is likely to benefit from learning and using skills as they work toward the goals identified in their treatment plan.      ARLENE Savage  November 30, 2020  "

## 2020-11-30 NOTE — GROUP NOTE
Psychoeducation Group Note    PATIENT'S NAME: Jammie Mariee  MRN:   [id number]  :   1965  ACCT. NUMBER: [id number]  DATE OF SERVICE: 20  START TIME: 10:00 AM  END TIME: 10:50 AM  FACILITATOR: Roseanne Valdez OTR/L  TOPIC: MH Life Skills Group: Communication and Social Skills Development  [facility] 55+ [facility]  TRACK: A1    NUMBER OF PARTICIPANTS: 7    Telemedicine Visit: The patient's condition can be safely assessed and treated via synchronous audio and visual telemedicine encounter.      Reason for Telemedicine Visit: Services only offered telehealth    Originating Site (Patient Location): Patient's home    Distant Site (Provider Location): [facility] [place] [place] [address]    Consent:  The patient/guardian has verbally consented to: the potential risks and benefits of telemedicine (video visit) versus in person care; bill my insurance or make self-payment for services provided; and responsibility for payment of non-covered services.     Mode of Communication:  Video Conference via Azullo    As the provider I attest to compliance with applicable laws and regulations related to telemedicine.     Summary of Group / Topics Discussed:  Communication and Social Skills Development: Social Supports: Reaching Out: Patients were taught and gained awareness of the importance of asking for help from other people as a way to expand their support network.  Patients explored and discussed possible resources in the community as a way to expand their social support network.  Patients were provided with a written list of community resources to help improve overall connection with other people.      Patient Session Goals / Objectives:    Identified social support needs and ways to work on building these supports     Improved awareness of important aspects of asking for help and support and how this relates to mental health recovery     Improved awareness of community resources  available to help strengthen their support network       Established a plan for practice of these skills in their own environments    Practiced and reflected on how to generalize taught skills to their everyday life        Patient Participation / Response:  Minimally participated, only when prompted / asked.    Patient presentation: quiet in group today but appeared attentive to the discussion, Verbalized understanding of content and Patient would benefit from additional opportunities to practice the content to be able to generalize it to their everyday life with increased intentionality, consistency, and efficacy in support of their psychiatric recovery    Treatment Plan:  Patient has a current master individualized treatment plan.  See Epic treatment plan for more information.    Roseanne Valdez, OTR/L

## 2020-11-30 NOTE — GROUP NOTE
Telemedicine Visit: The patient's condition can be safely assessed and treated via synchronous audio and visual telemedicine encounter.      Reason for Telemedicine Visit: Services only offered telehealth    Originating Site (Patient Location): Patient's home    Distant Site (Provider Location): Provider Remote Setting    Consent:  The patient/guardian has verbally consented to: the potential risks and benefits of telemedicine (video visit) versus in person care; bill my insurance or make self-payment for services provided; and responsibility for payment of non-covered services.     Mode of Communication:  Video Conference via [employer]    As the provider I attest to compliance with applicable laws and regulations related to telemedicine.    Psychotherapy Group Note    PATIENT'S NAME: Jammie Mariee  MRN:   [id number]  :   1965  ACCT. NUMBER: [id number]  DATE OF SERVICE: 20  START TIME: 11:00 AM  END TIME: 11:50 AM  FACILITATOR: Ermelinda Flynn LMFT  TOPIC:  EBP Group: Specialty Awareness  [place] [address]+ Program  TRACK: A1    NUMBER OF PARTICIPANTS: 7    Summary of Group / Topics Discussed:  Specialty Topics: Grief/Transitions: Patients received an overview of the grief process.  Patients explored their relationship to loss and how that has affected their mental health symptoms.  Strategies for recognizing loss and ideas for engaging in the grief process was presented and discussed.  Patients identified needs for support and coping skills to manage loss.  The purpose of this specialty topic is to help patients identify the aspects of change due to loss that individuals experience in addition to mental health symptoms to better cope with the grief, loss, and life transitions.      Patient Session Goals / Objectives:    Identified grief, loss, and life transitions     Discussed how grief impacts mental health symptoms and disrupts usual functioning    Identified needs for support and coping  with grief and planned further action for coping        Patient Participation / Response:  Moderately participated, sharing some personal reflections / insights and adequately adequately received / provided feedback with other participants.    Demonstrated understanding of topics discussed through group discussion and participation, Identified / Expressed readiness to act on skill suggestions discussed in topic and Practiced skills in session    Treatment Plan:  Patient has a current master individualized treatment plan.  See Epic treatment plan for more information.    ARLENE Savage

## 2020-12-01 ENCOUNTER — HOSPITAL ENCOUNTER (OUTPATIENT)
Dept: BEHAVIORAL HEALTH | Facility: CLINIC | Age: 55
End: 2020-12-01
Attending: PSYCHIATRY & NEUROLOGY
Payer: COMMERCIAL

## 2020-12-01 PROCEDURE — 90853 GROUP PSYCHOTHERAPY: CPT | Mod: 95

## 2020-12-01 PROCEDURE — G0177 OPPS/PHP; TRAIN & EDUC SERV: HCPCS | Mod: GT

## 2020-12-01 PROCEDURE — G0177 OPPS/PHP; TRAIN & EDUC SERV: HCPCS | Mod: GT | Performed by: OCCUPATIONAL THERAPIST

## 2020-12-01 NOTE — GROUP NOTE
Psychoeducation Group Note    PATIENT'S NAME: Jammie Mariee  MRN:   [id number]  :   1965  ACCT. NUMBER: [id number]  DATE OF SERVICE: 20  START TIME:  9:00 AM  END TIME:  9:50 AM  FACILITATOR: Alicia He RN  TOPIC: DELMAR RN Group: Medication Education and Management  [facility] 55+ Program  TRACK: a1    NUMBER OF PARTICIPANTS: 7    Summary of Group / Topics Discussed:  Medication Educations and Management:  Medication Jeopardy: Patients provided education regarding medication safety, antidepressants, side effects, neuroleptics, expected medication outcomes, knowledge of diagnosis, symptoms, and symptom management through an engaging jeopardy-style format.     Patient Session Goals / Objectives:    ? Participated in team-based Jeopardy game  ? Identified strategies for safe use, handling, and disposal of medications  ? Discussed basic aspects of medication safety, side effects, adverse outcomes and contraindications  Telemedicine Visit: The patient's condition can be safely assessed and treated via synchronous audio and visual telemedicine encounter.      Reason for Telemedicine Visit: Services only offered telehealth    Originating Site (Patient Location): Patient's home    Distant Site (Provider Location): Provider Remote Setting    Consent:  The patient/guardian has verbally consented to: the potential risks and benefits of telemedicine (video visit) versus in person care; bill my insurance or make self-payment for services provided; and responsibility for payment of non-covered services.     Mode of Communication:  Video Conference via [employer]    As the provider I attest to compliance with applicable laws and regulations related to telemedicine.       Patient Participation / Response:  Fully participated with the group by sharing personal reflections / insights and openly received / provided feedback with other participants.     Demonstrated understanding of topics discussed through  group discussion and participation    Treatment Plan:  Patient has a current master individualized treatment plan.  See Epic treatment plan for more information.    Alicia He RN

## 2020-12-01 NOTE — GROUP NOTE
Telemedicine Visit: The patient's condition can be safely assessed and treated via synchronous audio and visual telemedicine encounter.      Reason for Telemedicine Visit: Services only offered telehealth    Originating Site (Patient Location): Patient's home    Distant Site (Provider Location): Provider Remote Setting    Consent:  The patient/guardian has verbally consented to: the potential risks and benefits of telemedicine (video visit) versus in person care; bill my insurance or make self-payment for services provided; and responsibility for payment of non-covered services.     Mode of Communication:  Video Conference via [employer]IS    As the provider I attest to compliance with applicable laws and regulations related to telemedicine.  Process Group Note    PATIENT'S NAME: Jammie Mariee  MRN:   [id number]  :   1965  ACCT. NUMBER: [id number]  DATE OF SERVICE: 20  START TIME: 10:00 AM  END TIME: 10:50 AM  FACILITATOR: Ermelinda Flynn LMFT  TOPIC:  Process Group    Diagnoses:  296.53 Bipolar I Disorder Current or Most Recent Episode Depressed, Severe    4. Other Diagnoses that is relevant to services:   300.00 (F41.9) Unspecified Anxiety Disorder       [facility] 55+ Program  TRACK: A1    NUMBER OF PARTICIPANTS: 7          Data:    Session content: At the start of this group, patients were invited to check in by identifying themselves, describing their current emotional status, and identifying issues to address in this group.   Area(s) of treatment focus addressed in this session included Symptom Management.  Processed continued feelings of anxiety. Reports it has disrupted her appetite and has her feeling nauseous. Identifies her daughter is with her and has been supportive of her to use skills when she feels overwhelmed. Explored skills to help reduce anxiety.     Therapeutic Interventions/Treatment Strategies:  Psychotherapist offered support, feedback and validation and reinforced use  "of skills. Treatment modalities used include Cognitive Behavioral Therapy. Interventions include Coping Skills: Assisted patient in identifying 1-2 healthy distraction skills to reduce overall distress and Discussed how the use of intentional \"in the moment\" actions can help reduce distress.    Assessment:    Patient response:   Patient responded to session by accepting feedback, listening and verbalizing understanding    Possible barriers to participation / learning include: and no barriers identified    Health Issues:   None reported       Substance Use Review:   Substance Use: No active concerns identified.    Mental Status/Behavioral Observations  Appearance:   Appropriate   Eye Contact:   Good   Psychomotor Behavior: Normal   Attitude:   Cooperative   Orientation:   All  Speech   Rate / Production: Normal    Volume:  Normal   Mood:    Anxious   Affect:    Worrisome   Thought Content:   Clear  Thought Form:  Coherent  Logical     Insight:    Poor     Plan:     Safety Plan: No current safety concerns identified.  Recommended that patient call 911 or go to the local ED should there be a change in any of these risk factors.     Barriers to treatment: None identified    Patient Contracts (see media tab):  None    Substance Use: Not addressed in session     Continue or Discharge: Patient will continue in 55+ Program (55+) as planned. Patient is likely to benefit from learning and using skills as they work toward the goals identified in their treatment plan.      ARLENE Savage  December 1, 2020  "

## 2020-12-01 NOTE — GROUP NOTE
Psychoeducation Group Note    PATIENT'S NAME: Jammie Mariee  MRN:   [id number]  :   1965  ACCT. NUMBER: [id number]  DATE OF SERVICE: 20  START TIME: 11:00 AM  END TIME: 11:50 AM  FACILITATOR: Roseanne Valdez OTR/L  TOPIC: MH Life Skills Group: Communication and Social Skills Development  [facility] 55+ [facility]  TRACK: A1    NUMBER OF PARTICIPANTS: 7    Telemedicine Visit: The patient's condition can be safely assessed and treated via synchronous audio and visual telemedicine encounter.      Reason for Telemedicine Visit: Services only offered telehealth    Originating Site (Patient Location): Patient's home    Distant Site (Provider Location): [facility] [place] [address]    Consent:  The patient/guardian has verbally consented to: the potential risks and benefits of telemedicine (video visit) versus in person care; bill my insurance or make self-payment for services provided; and responsibility for payment of non-covered services.     Mode of Communication:  Video Conference via [employer]    As the provider I attest to compliance with applicable laws and regulations related to telemedicine.     Summary of Group / Topics Discussed:  Communication and Social Skills Development: Social Supports: Making Connections: Patients were taught about the importance of connecting with other people.  Patients gained awareness of their personal strengths and opportunities for growth in communicating with others.  Patients were taught skills and given an opportunity to practice ways to improve their ability to connect with others.  Began introducing the skill of using whole messages in communication.  Will continue working on this next group.      Patient Session Goals / Objectives:    Identified strengths and opportunities for growth in connecting with other people and how this impacts their ability to communicate clearly with other people       Improved awareness of  important aspects of the need for connection with other people and how this relates to mental health recovery        Established a plan for practice of these skills in their own environments    Practiced and reflected on how to generalize taught skills to their everyday life      Patient Participation / Response:  Minimally participated, only when prompted / asked.    Patient presentation: quiet in group today; appeared attentive to group discussion; energy level seemed low and Patient would benefit from additional opportunities to practice the content to be able to generalize it to their everyday life with increased intentionality, consistency, and efficacy in support of their psychiatric recovery    Treatment Plan:  Patient has a current master individualized treatment plan.  See Epic treatment plan for more information.    Roseanne Valdez, OTR/L

## 2020-12-03 ENCOUNTER — HOSPITAL ENCOUNTER (OUTPATIENT)
Dept: BEHAVIORAL HEALTH | Facility: CLINIC | Age: 55
End: 2020-12-03
Attending: PSYCHIATRY & NEUROLOGY
Payer: COMMERCIAL

## 2020-12-03 PROCEDURE — G0177 OPPS/PHP; TRAIN & EDUC SERV: HCPCS | Mod: GT

## 2020-12-03 PROCEDURE — G0177 OPPS/PHP; TRAIN & EDUC SERV: HCPCS | Mod: 95 | Performed by: OCCUPATIONAL THERAPIST

## 2020-12-03 PROCEDURE — 90853 GROUP PSYCHOTHERAPY: CPT | Mod: 95

## 2020-12-03 NOTE — GROUP NOTE
Telemedicine Visit: The patient's condition can be safely assessed and treated via synchronous audio and visual telemedicine encounter.      Reason for Telemedicine Visit: Services only offered telehealth    Originating Site (Patient Location): Patient's home    Distant Site (Provider Location): Provider Remote Setting    Consent:  The patient/guardian has verbally consented to: the potential risks and benefits of telemedicine (video visit) versus in person care; bill my insurance or make self-payment for services provided; and responsibility for payment of non-covered services.     Mode of Communication:  Video Conference via [employer]    As the provider I attest to compliance with applicable laws and regulations related to telemedicine.  Process Group Note    PATIENT'S NAME: Jammie Mariee  MRN:   [id number]  :   1965  ACCT. NUMBER: [id number]  DATE OF SERVICE: 20  START TIME:  9:00 AM  END TIME:  9:50 AM  FACILITATOR: Ermelinda Flynn LMFT  TOPIC:  Process Group    Diagnoses:  296.53 Bipolar I Disorder Current or Most Recent Episode Depressed, Severe    4. Other Diagnoses that is relevant to services:   300.00 (F41.9) Unspecified Anxiety Disorder       [facility] 55+ Program  TRACK: A1    NUMBER OF PARTICIPANTS: 7          Data:    Session content: At the start of this group, patients were invited to check in by identifying themselves, describing their current emotional status, and identifying issues to address in this group.   Area(s) of treatment focus addressed in this session included Symptom Management.  Processed feeling more stable with having her daughter being home with her for support. Identifies she is using some skills with the support of her daughter to cope with anxiety. Reports she is going to decorate for the holiday to be more positive. No safety concerns.     Therapeutic Interventions/Treatment Strategies:  Psychotherapist offered support, feedback and validation and  "reinforced use of skills. Treatment modalities used include Cognitive Behavioral Therapy. Interventions include Coping Skills: Discussed how the use of intentional \"in the moment\" actions can help reduce distress and Assisted patient in understanding the purpose of planning / creating / participating / sharing in positive experiences.    Assessment:    Patient response:   Patient responded to session by accepting feedback, giving feedback, listening and verbalizing understanding    Possible barriers to participation / learning include: and no barriers identified    Health Issues:   None reported       Substance Use Review:   Substance Use: No active concerns identified.    Mental Status/Behavioral Observations  Appearance:   Appropriate   Eye Contact:   Good   Psychomotor Behavior: Normal   Attitude:   Cooperative   Orientation:   All  Speech   Rate / Production: Normal    Volume:  Normal   Mood:    Normal  Affect:    Appropriate   Thought Content:   Clear  Thought Form:  Coherent  Logical     Insight:    Fair     Plan:     Safety Plan: No current safety concerns identified.  Recommended that patient call 911 or go to the local ED should there be a change in any of these risk factors.     Barriers to treatment: None identified    Patient Contracts (see media tab):  None    Substance Use: Not addressed in session     Continue or Discharge: Patient will continue in 55+ Program (55+) as planned. Patient is likely to benefit from learning and using skills as they work toward the goals identified in their treatment plan.      ARLENE Savage  December 3, 2020  "

## 2020-12-03 NOTE — GROUP NOTE
Psychoeducation Group Note    PATIENT'S NAME: Jammie Mariee  MRN:   [id number]  :   1965  ACCT. NUMBER: [id number]  DATE OF SERVICE: 20  START TIME: 11:00 AM  END TIME: 11:50 AM  FACILITATOR: Alicia He RN  TOPIC: MH RN Group: Brain Health  [facility] 55+ Program  TRACK: a1    NUMBER OF PARTICIPANTS: 7    Summary of Group / Topics Discussed:  Brain Health:  Pathophysiology of stress and anxiety: Patients were educated on the difference between stress, chronic stress, and anxiety. The anatomy and pathophysiology of the body/brain were reviewed to illustrate the immediate effects of stress/anxiety in the body and the long term effects and increased risks for chronic disease that come from unmanaged stress/anxiety. Self-coping strategies to manage symptoms of stress were reviewed and pharmacologic, psychotherapeutic, and complementary treatment options were discussed.    Patient Session Goals / Objectives:  ? Described the differences between stress and anxiety and how the body responds to it  ? Listed the long term effects and increased risks for chronic disease that can arise from unmanaged stress/anxiety  ? Identified and described pharmacologic, psychotherapeutic, and complementary treatment options  Telemedicine Visit: The patient's condition can be safely assessed and treated via synchronous audio and visual telemedicine encounter.      Reason for Telemedicine Visit: Services only offered telehealth    Originating Site (Patient Location): Patient's home    Distant Site (Provider Location): Provider Remote Setting    Consent:  The patient/guardian has verbally consented to: the potential risks and benefits of telemedicine (video visit) versus in person care; bill my insurance or make self-payment for services provided; and responsibility for payment of non-covered services.     Mode of Communication:  Video Conference via [employer]    As the provider I attest to compliance with  applicable laws and regulations related to telemedicine.       Patient Participation / Response:  Fully participated with the group by sharing personal reflections / insights and openly received / provided feedback with other participants.    Demonstrated understanding of topics discussed through group discussion and participation    Treatment Plan:  Patient has a current master individualized treatment plan.  See Epic treatment plan for more information.    Alicia He RN

## 2020-12-04 NOTE — GROUP NOTE
Psychoeducation Group Note    PATIENT'S NAME: Jammie Mariee  MRN:   [id number]  :   1965  ACCT. NUMBER: [id number]  DATE OF SERVICE: 20  START TIME: 10:00 AM  END TIME: 10:50 AM  FACILITATOR: Roseanne Valdez OTR/L  TOPIC:  Life Skills Group: Communication and Social Skills Development  [facility] 55+ Program  TRACK: A1    NUMBER OF PARTICIPANTS: 7    Summary of Group / Topics Discussed:  Communication and Social Skills Development: Communication Styles: Assertiveness: Patients participated in a psychoeducational discussion on assertiveness and how this impacts their communication with other people.  Patients were given an opportunity to identify strengths as well as areas for improvement in assertive communication.  Patients were taught basic components of assertive responses and identified strengths and challenges they have with these.       Patient Session Goals / Objectives:    Identified strengths and areas for improvement in assertive communication and how that impacts their ability to communicate clearly with other people       Improved awareness of important aspects of assertive communication and how this relates to mental health recovery        Established a plan for practice of these skills in their own environments    Practiced and reflected on how to generalize taught skills to their everyday life        Patient Participation / Response:  Moderately participated, sharing some personal reflections / insights and adequately adequately received / provided feedback with other participants.    Patient presentation: constricted affect; generally quiet in group but shared some insights at times; energy level seemed low, Verbalized understanding of content and Patient would benefit from additional opportunities to practice the content to be able to generalize it to their everyday life with increased intentionality, consistency, and efficacy in support of their psychiatric  recovery    Treatment Plan:  Patient has a current master individualized treatment plan.  See Epic treatment plan for more information.    Roseanne Valdez, OTR/L

## 2020-12-07 ENCOUNTER — HOSPITAL ENCOUNTER (OUTPATIENT)
Dept: BEHAVIORAL HEALTH | Facility: CLINIC | Age: 55
End: 2020-12-07
Attending: PSYCHIATRY & NEUROLOGY
Payer: COMMERCIAL

## 2020-12-07 PROCEDURE — 90853 GROUP PSYCHOTHERAPY: CPT | Mod: GT

## 2020-12-07 PROCEDURE — G0177 OPPS/PHP; TRAIN & EDUC SERV: HCPCS | Mod: 95 | Performed by: OCCUPATIONAL THERAPIST

## 2020-12-07 PROCEDURE — 90853 GROUP PSYCHOTHERAPY: CPT | Mod: 95

## 2020-12-07 NOTE — GROUP NOTE
Telemedicine Visit: The patient's condition can be safely assessed and treated via synchronous audio and visual telemedicine encounter.      Reason for Telemedicine Visit: Services only offered telehealth    Originating Site (Patient Location): Patient's home    Distant Site (Provider Location): Provider Remote Setting    Consent:  The patient/guardian has verbally consented to: the potential risks and benefits of telemedicine (video visit) versus in person care; bill my insurance or make self-payment for services provided; and responsibility for payment of non-covered services.     Mode of Communication:  Video Conference via [employer]    As the provider I attest to compliance with applicable laws and regulations related to telemedicine.  Psychotherapy Group Note    PATIENT'S NAME: Jammie Mariee  MRN:   [id number]  :   1965  ACCT. NUMBER: [id number]  DATE OF SERVICE: 20  START TIME: 11:00 AM  END TIME: 11:50 AM  FACILITATOR: Ermelinda Flynn LMFT  TOPIC:  EBP Group: Relationship Skills  [place] [address]55+ Program  TRACK: A1    NUMBER OF PARTICIPANTS: 7    Summary of Group / Topics Discussed:  Relationship Skills: Dependencies: Patients were provided with a general overview of different types of dependencies and how they relate to symptoms and functioning. The purpose is to help patients identify the different types of dependencies, how they may be impacted by them, and to gain awareness and skills to work towards healthier relationships.    Patient Session Goals / Objectives:    Familiarized patients with the concept of interpersonal relationships and their characteristics.      Discussed and practiced strategies to promote healthier understanding of interpersonal relationships with a focus on dependencies    Identified types of dependencies in patient s lives and how they impact their symptoms and functioning      Patient Participation / Response:  Moderately participated, sharing some  personal reflections / insights and adequately adequately received / provided feedback with other participants.    Demonstrated understanding of topics discussed through group discussion and participation, Demonstrated understanding of relationship skills and communication skills and Practiced skills in session    Treatment Plan:  Patient has a current master individualized treatment plan.  See Epic treatment plan for more information.    ARLENE Savage

## 2020-12-07 NOTE — GROUP NOTE
"Telemedicine Visit: The patient's condition can be safely assessed and treated via synchronous audio and visual telemedicine encounter.      Reason for Telemedicine Visit: Services only offered telehealth    Originating Site (Patient Location): Patient's home    Distant Site (Provider Location): Provider Remote Setting    Consent:  The patient/guardian has verbally consented to: the potential risks and benefits of telemedicine (video visit) versus in person care; bill my insurance or make self-payment for services provided; and responsibility for payment of non-covered services.     Mode of Communication:  Video Conference via [employer]    As the provider I attest to compliance with applicable laws and regulations related to telemedicine.  Process Group Note    PATIENT'S NAME: Jammie Mariee  MRN:   [id number]  :   1965  ACCT. NUMBER: [id number]  DATE OF SERVICE: 20  START TIME:  9:00 AM  END TIME:  9:50 AM  FACILITATOR: Ermelinda Flynn LMFT  TOPIC:  Process Group    Diagnoses:  296.53 Bipolar I Disorder Current or Most Recent Episode Depressed, Severe    4. Other Diagnoses that is relevant to services:   300.00 (F41.9) Unspecified Anxiety Disorder       [facility] 55+ Program  TRACK: a1    NUMBER OF PARTICIPANTS: 7          Data:    Session content: At the start of this group, patients were invited to check in by identifying themselves, describing their current emotional status, and identifying issues to address in this group.   Area(s) of treatment focus addressed in this session included Symptom Management.  Processed struggling with focus and reports she seems to always have a \"fog\" with her thinking. Explored skills to use for grounding and to stay focused. Reports she will start to ask for support as well when needed. No safety concerns.     Therapeutic Interventions/Treatment Strategies:  Psychotherapist offered support, feedback and validation and reinforced use of skills. Treatment " "modalities used include Cognitive Behavioral Therapy. Interventions include Coping Skills: Discussed how the use of intentional \"in the moment\" actions can help reduce distress and Promoted understanding of how and when to apply grounding strategies to reduce distress and increase presence in the moment.    Assessment:    Patient response:   Patient responded to session by accepting feedback, giving feedback, listening and verbalizing understanding    Possible barriers to participation / learning include: and no barriers identified    Health Issues:   None reported       Substance Use Review:   Substance Use: No active concerns identified.    Mental Status/Behavioral Observations  Appearance:   Appropriate   Eye Contact:   Good   Psychomotor Behavior: Normal   Attitude:   Cooperative   Orientation:   All  Speech   Rate / Production: Normal    Volume:  Normal   Mood:    Anxious  Depressed   Affect:    Worrisome   Thought Content:   Clear  Thought Form:  Coherent  Logical     Insight:    Poor     Plan:     Safety Plan: No current safety concerns identified.  Recommended that patient call 911 or go to the local ED should there be a change in any of these risk factors.     Barriers to treatment: None identified    Patient Contracts (see media tab):  None    Substance Use: Not addressed in session     Continue or Discharge: Patient will continue in 55+ Program (55+) as planned. Patient is likely to benefit from learning and using skills as they work toward the goals identified in their treatment plan.      ARLENE Savage  December 7, 2020  "

## 2020-12-07 NOTE — GROUP NOTE
Psychoeducation Group Note    PATIENT'S NAME: Jammie Mariee  MRN:   [id number]  :   1965  ACCT. NUMBER: [id number]  DATE OF SERVICE: 20  START TIME: 10:00 AM  END TIME: 10:50 AM  FACILITATOR: Roseanne Valdez OTR/L  TOPIC: MH Life Skills Group: Communication and Social Skills Development  [facility] 55+ [facility]  TRACK: A1    NUMBER OF PARTICIPANTS: 7    Telemedicine Visit: The patient's condition can be safely assessed and treated via synchronous audio and visual telemedicine encounter.      Reason for Telemedicine Visit: Services only offered telehealth    Originating Site (Patient Location): Patient's home    Distant Site (Provider Location): [facility] [place] [address]    Consent:  The patient/guardian has verbally consented to: the potential risks and benefits of telemedicine (video visit) versus in person care; bill my insurance or make self-payment for services provided; and responsibility for payment of non-covered services.     Mode of Communication:  Video Conference via [employer]    As the provider I attest to compliance with applicable laws and regulations related to telemedicine.     Summary of Group / Topics Discussed:  Communication and Social Skills Development: Communication Styles: The Four Types of Assertive Communication:  Patients were taught and gained awareness of different types of communication and how to be more assertive.  Patients participated in a role play discussion, identified and shared personal examples of different types of communication.  Patients discussed and learned skills and strategies to be more assertive and effective when communicating with other people.       Patient Session Goals / Objectives:    Identified current communication style and how this impacts their ability to communicate clearly with other people       Improved awareness of effective assertive communication skills and how this relates to mental health  recovery        Established a plan for practice of these skills in their own environments    Practiced and reflected on how to generalize taught skills to their everyday life      Patient Participation / Response:  Fully participated with the group by sharing personal reflections / insights and openly received / provided feedback with other participants.    Patient presentation: constricted affect; able to identify struggles she has with assertiveness; seemed open to supportive feedback from others, Verbalized understanding of content and Patient would benefit from additional opportunities to practice the content to be able to generalize it to their everyday life with increased intentionality, consistency, and efficacy in support of their psychiatric recovery    Treatment Plan:  Patient has a current master individualized treatment plan.  See Epic treatment plan for more information.    Roseanne Valdez, OTR/L

## 2020-12-08 ENCOUNTER — HOSPITAL ENCOUNTER (OUTPATIENT)
Dept: BEHAVIORAL HEALTH | Facility: CLINIC | Age: 55
End: 2020-12-08
Attending: PSYCHIATRY & NEUROLOGY
Payer: COMMERCIAL

## 2020-12-08 PROCEDURE — G0177 OPPS/PHP; TRAIN & EDUC SERV: HCPCS | Mod: GT | Performed by: OCCUPATIONAL THERAPIST

## 2020-12-08 PROCEDURE — 90853 GROUP PSYCHOTHERAPY: CPT | Mod: GT

## 2020-12-08 PROCEDURE — G0177 OPPS/PHP; TRAIN & EDUC SERV: HCPCS | Mod: GT

## 2020-12-08 NOTE — GROUP NOTE
Psychoeducation Group Note    PATIENT'S NAME: Jammie Mariee  MRN:   [id number]  :   1965  [id number]  DATE OF SERVICE: 20  START TIME:  9:00 AM  END TIME:  9:50 AM  FACILITATOR: Alicia He RN  TOPIC: DELMAR RN Group: Mind/Body Practice & Complementary  [facility] 55+ Program  TRACK: a1    NUMBER OF PARTICIPANTS: 7    Summary of Group / Topics Discussed:  Mind/Body Practice & Complementary Therapies:  Progressive Muscle Relaxation: In addition to affecting our mood and behavior, psychological stress can cause a myriad of physical symptoms in our body. Patients were educated on these effects and guided to increased self-awareness of how stress affects their body. The purpose, benefits, history, and techniques of progressive muscle relaxation were discussed. In an instructor guided experiential, patients were guided to practice PMR to help reduce physical symptoms of psychological stress and achieve a more balanced feeling of well-being.    Patient Session Goals / Objectives:  ? Identified physiological symptoms of stress on the body  ? Listed & Explained the purpose and benefits to practicing PMR   ? Practiced progressive muscle relaxation experiential  Telemedicine Visit: The patient's condition can be safely assessed and treated via synchronous audio and visual telemedicine encounter.      Reason for Telemedicine Visit: Services only offered telehealth    Originating Site (Patient Location): Patient's home    Distant Site (Provider Location): Provider Remote Setting    Consent:  The patient/guardian has verbally consented to: the potential risks and benefits of telemedicine (video visit) versus in person care; bill my insurance or make self-payment for services provided; and responsibility for payment of non-covered services.     Mode of Communication:  Video Conference via [employer]    As the provider I attest to compliance with applicable laws and regulations related to  telemedicine.       Patient Participation / Response:  Fully participated with the group by sharing personal reflections / insights and openly received / provided feedback with other participants.    Demonstrated understanding of topics discussed through group discussion and participation    Treatment Plan:  Patient has a current master individualized treatment plan.  See Epic treatment plan for more information.    Alicia He RN

## 2020-12-08 NOTE — GROUP NOTE
Psychoeducation Group Note    PATIENT'S NAME: Jammie Mariee  MRN:   [id number]  :   1965  ACCT. NUMBER: [id number]  DATE OF SERVICE: 20  START TIME: 11:00 AM  END TIME: 11:50 AM  FACILITATOR: Roseanne Valdez OTR/L  TOPIC: MH Life Skills Group: Communication and Social Skills Development  [facility] 55+ [facility]  TRACK: A1    NUMBER OF PARTICIPANTS: 7    Telemedicine Visit: The patient's condition can be safely assessed and treated via synchronous audio and visual telemedicine encounter.      Reason for Telemedicine Visit: Services only offered telehealth    Originating Site (Patient Location): Patient's home    Distant Site (Provider Location): [facility] [place] [address]    Consent:  The patient/guardian has verbally consented to: the potential risks and benefits of telemedicine (video visit) versus in person care; bill my insurance or make self-payment for services provided; and responsibility for payment of non-covered services.     Mode of Communication:  Video Conference via [employer]    As the provider I attest to compliance with applicable laws and regulations related to telemedicine.     Summary of Group / Topics Discussed:  Communication and Social Skills Development: Communication Styles: The Four Types of Assertive Communication:  Patients were given an opportunity to practice assertive communication in different common situations.  Patients identified barriers to effective communication and problem solved these barriers with assistance.  Patients discussed and learned skills and strategies to be more assertive and effective when communicating with other people.      Patient Session Goals / Objectives:    Identified current communication style and how this impacts their ability to communicate clearly with other people       Improved awareness of effective assertive communication skills and how this relates to mental health recovery         Established a plan for practice of these skills in their own environments    Practiced and reflected on how to generalize taught skills to their everyday life      Patient Participation / Response:  Moderately participated, sharing some personal reflections / insights and adequately adequately received / provided feedback with other participants.    Patient presentation: constricted affect; offered feedback and ideas; seemed open to suggestions, Verbalized understanding of content and Patient would benefit from additional opportunities to practice the content to be able to generalize it to their everyday life with increased intentionality, consistency, and efficacy in support of their psychiatric recovery    Treatment Plan:  Patient has a current master individualized treatment plan.  See Epic treatment plan for more information.    Roseanne Valdez, OTR/L

## 2020-12-08 NOTE — GROUP NOTE
Telemedicine Visit: The patient's condition can be safely assessed and treated via synchronous audio and visual telemedicine encounter.      Reason for Telemedicine Visit: Services only offered telehealth    Originating Site (Patient Location): Patient's home    Distant Site (Provider Location): Provider Remote Setting    Consent:  The patient/guardian has verbally consented to: the potential risks and benefits of telemedicine (video visit) versus in person care; bill my insurance or make self-payment for services provided; and responsibility for payment of non-covered services.     Mode of Communication:  Video Conference via [employer]    As the provider I attest to compliance with applicable laws and regulations related to telemedicine.  Process Group Note    PATIENT'S NAME: Jammie Mariee  MRN:   [id number]  :   1965  ACCT. NUMBER: [id number]  DATE OF SERVICE: 20  START TIME: 10:00 AM  END TIME: 10:50 AM  FACILITATOR: Ermelinda Flynn LMFT  TOPIC:  Process Group    Diagnoses:  296.53 Bipolar I Disorder Current or Most Recent Episode Depressed, Severe    4. Other Diagnoses that is relevant to services:   300.00 (F41.9) Unspecified Anxiety Disorder       [facility] 55+ Program  TRACK: A1    NUMBER OF PARTICIPANTS: 7            Data:    Session content: At the start of this group, patients were invited to check in by identifying themselves, describing their current emotional status, and identifying issues to address in this group.   Area(s) of treatment focus addressed in this session included Symptom Management.  Processed struggling with anxiety around paperwork for benefits and struggling to stay focused. Explored ways to build skills to stay focused and ask for support when needed. Will call customer support for help.       Therapeutic Interventions/Treatment Strategies:  Psychotherapist offered support, feedback and validation and reinforced use of skills. Treatment modalities used  "include Cognitive Behavioral Therapy. Interventions include Behavioral Activation: Reinforced benefits/challenges of change process through applying skills to replace unwanted behaviors and Coping Skills: Discussed how the use of intentional \"in the moment\" actions can help reduce distress.    Assessment:    Patient response:   Patient responded to session by accepting feedback, listening and verbalizing understanding    Possible barriers to participation / learning include: and no barriers identified    Health Issues:   None reported       Substance Use Review:   Substance Use: No active concerns identified.    Mental Status/Behavioral Observations  Appearance:   Appropriate   Eye Contact:   Good   Psychomotor Behavior: Normal   Attitude:   Cooperative   Orientation:   All  Speech   Rate / Production: Normal    Volume:  Normal   Mood:    Anxious   Affect:    Worrisome   Thought Content:   Clear  Thought Form:  Coherent  Logical     Insight:    Poor     Plan:     Safety Plan: No current safety concerns identified.  Recommended that patient call 911 or go to the local ED should there be a change in any of these risk factors.     Barriers to treatment: None identified    Patient Contracts (see media tab):  None    Substance Use: Not addressed in session     Continue or Discharge: Patient will continue in 55+ Program (55+) as planned. Patient is likely to benefit from learning and using skills as they work toward the goals identified in their treatment plan.      ARLENE Savage  December 9, 2020  "

## 2020-12-10 ENCOUNTER — HOSPITAL ENCOUNTER (OUTPATIENT)
Dept: BEHAVIORAL HEALTH | Facility: CLINIC | Age: 55
End: 2020-12-10
Attending: PSYCHIATRY & NEUROLOGY
Payer: COMMERCIAL

## 2020-12-10 PROCEDURE — 90853 GROUP PSYCHOTHERAPY: CPT | Mod: GT

## 2020-12-10 PROCEDURE — G0177 OPPS/PHP; TRAIN & EDUC SERV: HCPCS | Mod: 95 | Performed by: OCCUPATIONAL THERAPIST

## 2020-12-10 PROCEDURE — G0177 OPPS/PHP; TRAIN & EDUC SERV: HCPCS | Mod: GT

## 2020-12-10 NOTE — GROUP NOTE
Telemedicine Visit: The patient's condition can be safely assessed and treated via synchronous audio and visual telemedicine encounter.      Reason for Telemedicine Visit: Services only offered telehealth    Originating Site (Patient Location): Patient's home    Distant Site (Provider Location): Provider Remote Setting    Consent:  The patient/guardian has verbally consented to: the potential risks and benefits of telemedicine (video visit) versus in person care; bill my insurance or make self-payment for services provided; and responsibility for payment of non-covered services.     Mode of Communication:  Video Conference via [employer]    As the provider I attest to compliance with applicable laws and regulations related to telemedicine.  Process Group Note    PATIENT'S NAME: Jammie Mariee  MRN:   [id number]  :   1965  ACCT. NUMBER: [id number]  DATE OF SERVICE: 12/10/20  START TIME:  9:00 AM  END TIME:  9:55 AM  FACILITATOR: Ermelinda Flynn LMFT  TOPIC:  Process Group    Diagnoses:  296.53 Bipolar I Disorder Current or Most Recent Episode Depressed, Severe    4. Other Diagnoses that is relevant to services:   300.00 (F41.9) Unspecified Anxiety Disorder       [facility] 55+ Program  TRACK: A1    NUMBER OF PARTICIPANTS: 6          Data:    Session content: At the start of this group, patients were invited to check in by identifying themselves, describing their current emotional status, and identifying issues to address in this group.   Area(s) of treatment focus addressed in this session included Symptom Management.  Processed continued struggle with anxiety and panic. Explored ways to cope with symptoms. Reports she struggles identifying activities that she enjoys, Explored ways to ask for support and find activities she enjoys.     Therapeutic Interventions/Treatment Strategies:  Psychotherapist offered support, feedback and validation and reinforced use of skills. Treatment modalities used  "include Cognitive Behavioral Therapy. Interventions include Coping Skills: Discussed how the use of intentional \"in the moment\" actions can help reduce distress and Assisted patient in understanding the purpose of planning / creating / participating / sharing in positive experiences.    Assessment:    Patient response:   Patient responded to session by accepting feedback, giving feedback, listening, accepting support and verbalizing understanding    Possible barriers to participation / learning include: and no barriers identified    Health Issues:   None reported       Substance Use Review:   Substance Use: No active concerns identified.    Mental Status/Behavioral Observations  Appearance:   Appropriate   Eye Contact:   Good   Psychomotor Behavior: Normal   Attitude:   Cooperative   Orientation:   All  Speech   Rate / Production: Normal    Volume:  Normal   Mood:    Anxious   Affect:    Worrisome   Thought Content:   Clear  Thought Form:  Coherent  Logical     Insight:    Fair     Plan:     Safety Plan: No current safety concerns identified.  Recommended that patient call 911 or go to the local ED should there be a change in any of these risk factors.     Barriers to treatment: None identified    Patient Contracts (see media tab):  None    Substance Use: Not addressed in session     Continue or Discharge: Patient will continue in 55+ Program (55+) as planned. Patient is likely to benefit from learning and using skills as they work toward the goals identified in their treatment plan.      ARLENE Savage  December 10, 2020  "

## 2020-12-10 NOTE — GROUP NOTE
Psychoeducation Group Note    PATIENT'S NAME: Jammie Mariee  MRN:   [id number]  :   1965  ACCT. NUMBER: [id number]  DATE OF SERVICE: 12/10/20  START TIME: 10:00 AM  END TIME: 10:50 AM  FACILITATOR: Roseanne Valdez OTR/L  TOPIC: MH Life Skills Group: Communication and Social Skills Development  [facility] 55+ [facility]  TRACK: A1    NUMBER OF PARTICIPANTS: 6    Telemedicine Visit: The patient's condition can be safely assessed and treated via synchronous audio and visual telemedicine encounter.      Reason for Telemedicine Visit: Services only offered telehealth    Originating Site (Patient Location): Patient's home    Distant Site (Provider Location): [facility] [place] [address]    Consent:  The patient/guardian has verbally consented to: the potential risks and benefits of telemedicine (video visit) versus in person care; bill my insurance or make self-payment for services provided; and responsibility for payment of non-covered services.     Mode of Communication:  Video Conference via [employer]    As the provider I attest to compliance with applicable laws and regulations related to telemedicine.     Summary of Group / Topics Discussed:  Communication and Social Skills Development: Communication Styles: The Four Types of Assertive Communication Practical Application:  Patients identified and practiced how to be more assertive in session.  Patients identified and shared personal examples of difficulties in communication.  Patients practiced, discussed and learned skills and strategies to be more assertive and effective when communicating with other people.      Patient Session Goals / Objectives:    Identified current communication style and how this impacts their ability to communicate clearly with other people       Improved awareness of effective assertive communication skills and how this relates to mental health recovery        Established a plan for practice  of these skills in their own environments    Practiced and reflected on how to generalize taught skills to their everyday life      Patient Participation / Response:  Fully participated with the group by sharing personal reflections / insights and openly received / provided feedback with other participants.    Patient presentation: active in group discussion; adequate concentration observed, Demonstrated understanding of content through identifying and sharing examples related to assertiveness; provided helpful feedback to peers  and Patient would benefit from additional opportunities to practice the content to be able to generalize it to their everyday life with increased intentionality, consistency, and efficacy in support of their psychiatric recovery    Treatment Plan:  Patient has a current master individualized treatment plan.  See Epic treatment plan for more information.    Roseanne Valdez, OTR/L

## 2020-12-10 NOTE — GROUP NOTE
Psychoeducation Group Note    PATIENT'S NAME: Jammie Mariee  MRN:   [id number]  :   1965  [id number]  DATE OF SERVICE: 12/10/20  START TIME: 11:00 AM  END TIME: 11:50 AM  FACILITATOR: Alicia He RN  TOPIC: DELMAR RN Group: Mind/Body Practice & Complementary  [facility] 55+ Program  TRACK: a1    NUMBER OF PARTICIPANTS: 6    Summary of Group / Topics Discussed:  Mind/Body Practice & Complementary Therapies:  Progressive Muscle Relaxation: In addition to affecting our mood and behavior, psychological stress can cause a myriad of physical symptoms in our body. Patients were educated on these effects and guided to increased self-awareness of how stress affects their body. The purpose, benefits, history, and techniques of progressive muscle relaxation were discussed. In an instructor guided experiential, patients were guided to practice PMR to help reduce physical symptoms of psychological stress and achieve a more balanced feeling of well-being.    Patient Session Goals / Objectives:  ? Identified physiological symptoms of stress on the body  ? Listed & Explained the purpose and benefits to practicing PMR   ? Practiced progressive muscle relaxation experiential  Telemedicine Visit: The patient's condition can be safely assessed and treated via synchronous audio and visual telemedicine encounter.      Reason for Telemedicine Visit: Services only offered telehealth    Originating Site (Patient Location): Patient's home    Distant Site (Provider Location): Provider Remote Setting    Consent:  The patient/guardian has verbally consented to: the potential risks and benefits of telemedicine (video visit) versus in person care; bill my insurance or make self-payment for services provided; and responsibility for payment of non-covered services.     Mode of Communication:  Video Conference via [employer]    As the provider I attest to compliance with applicable laws and regulations related to  telemedicine.       Patient Participation / Response:  Fully participated with the group by sharing personal reflections / insights and openly received / provided feedback with other participants.    Demonstrated understanding of topics discussed through group discussion and participation    Treatment Plan:  Patient has a current master individualized treatment plan.  See Epic treatment plan for more information.    Alicia He RN

## 2020-12-14 ENCOUNTER — HOSPITAL ENCOUNTER (OUTPATIENT)
Dept: BEHAVIORAL HEALTH | Facility: CLINIC | Age: 55
End: 2020-12-14
Attending: PSYCHIATRY & NEUROLOGY
Payer: COMMERCIAL

## 2020-12-14 PROCEDURE — G0177 OPPS/PHP; TRAIN & EDUC SERV: HCPCS | Mod: GT | Performed by: OCCUPATIONAL THERAPIST

## 2020-12-14 PROCEDURE — G0177 OPPS/PHP; TRAIN & EDUC SERV: HCPCS | Mod: GT

## 2020-12-14 PROCEDURE — 90853 GROUP PSYCHOTHERAPY: CPT | Mod: GT

## 2020-12-14 NOTE — GROUP NOTE
Psychoeducation Group Note    PATIENT'S NAME: Jammie Mariee  MRN:   [id number]  :   1965  ACCT. NUMBER: [id number]  DATE OF SERVICE: 20  START TIME: 10:00 AM  END TIME: 10:50 AM  FACILITATOR: Karthik Gordon OTR/L  TOPIC: MH Life Skills Group: Sensory Approaches in Mental Health  [facility] 55+ Program  TRACK: A1    NUMBER OF PARTICIPANTS: 6  Telemedicine Visit: The patient's condition can be safely assessed and treated via synchronous audio and visual telemedicine encounter.      Reason for Telemedicine Visit: Services only offered telehealth    Originating Site (Patient Location): Patient's home    Distant Site (Provider Location): [facility]: [facility]    Consent:  The patient/guardian has verbally consented to: the potential risks and benefits of telemedicine (video visit) versus in person care; bill my insurance or make self-payment for services provided; and responsibility for payment of non-covered services.     Mode of Communication:  Video Conference via [employer]    As the provider I attest to compliance with applicable laws and regulations related to telemedicine.      Summary of Group / Topics Discussed:  Sensory Approaches in Mental Health:  Sensory Enhanced Mindfulness: Patients were taught and provided with an opportunity to explore and practice how using sensory enhanced mindfulness practices can help them stay grounded in the present moment as a way to manage mental health symptoms and stressors.         Patient Session Goals / Objectives:    Identified how using sensory enhanced mindfulness practices can be used for grounding, stress management, and self regulation      Improved awareness of different types of sensory enhanced mindfulness activities that assist with healthy coping of stress and symptoms      Established a plan for practice of these skills in their own environments    Practiced and reflected on how to generalize taught skills to their  everyday life        Patient Participation / Response:  Fully participated with the group by sharing personal reflections / insights and openly received / provided feedback with other participants.    Patient presentation: noted she had just tried this type of practice yesterday and in the past and benefits; open to try this technique, Verbalized understanding of content and Patient would benefit from additional opportunities to practice the content to be able to generalize it to their everyday life with increased intentionality, consistency, and efficacy in support of their psychiatric recovery    Treatment Plan:  Patient has a current master individualized treatment plan.  See Epic treatment plan for more information.    Karthik Gordon, OTR/L

## 2020-12-14 NOTE — GROUP NOTE
Telemedicine Visit: The patient's condition can be safely assessed and treated via synchronous audio and visual telemedicine encounter.      Reason for Telemedicine Visit: Services only offered telehealth    Originating Site (Patient Location): Patient's home    Distant Site (Provider Location): Provider Remote Setting    Consent:  The patient/guardian has verbally consented to: the potential risks and benefits of telemedicine (video visit) versus in person care; bill my insurance or make self-payment for services provided; and responsibility for payment of non-covered services.     Mode of Communication:  Video Conference via [employer]    As the provider I attest to compliance with applicable laws and regulations related to telemedicine.  Process Group Note    PATIENT'S NAME: Jammie Mariee  MRN:   [id number]  :   1965  ACCT. NUMBER: [id number]  DATE OF SERVICE: 20  START TIME:  9:00 AM  END TIME:  9:50 AM  FACILITATOR: Ermelinda Flynn LMFT  TOPIC:  Process Group    Diagnoses:  296.53 Bipolar I Disorder Current or Most Recent Episode Depressed, Severe    4. Other Diagnoses that is relevant to services:   300.00 (F41.9) Unspecified Anxiety Disorder       [facility] 55+ Program  TRACK: A1     NUMBER OF PARTICIPANTS: 6          Data:    Session content: At the start of this group, patients were invited to check in by identifying themselves, describing their current emotional status, and identifying issues to address in this group.   Area(s) of treatment focus addressed in this session included Symptom Management.  Processed feeling difficulty expressing herself around her symptoms and emotions. Reports she was able to drive this weekend on her own, coped with first trip and struggled with second and required assistance from . Reports spending part of day in bed and becoming uncomfortable and noticing an increase in anxiety so using yoga to cope with symptoms. Explored ways to continue  "to practice skills to cope with anxiety. No safety concerns.     Therapeutic Interventions/Treatment Strategies:  Psychotherapist offered support, feedback and validation and reinforced use of skills. Treatment modalities used include Cognitive Behavioral Therapy. Interventions include Coping Skills: Assisted patient in identifying 1-2 healthy distraction skills to reduce overall distress and Discussed how the use of intentional \"in the moment\" actions can help reduce distress.    Assessment:    Patient response:   Patient responded to session by accepting feedback, giving feedback, listening and verbalizing understanding    Possible barriers to participation / learning include: and no barriers identified    Health Issues:   None reported       Substance Use Review:   Substance Use: No active concerns identified.    Mental Status/Behavioral Observations  Appearance:   Appropriate   Eye Contact:   Good   Psychomotor Behavior: Normal   Attitude:   Cooperative   Orientation:   All  Speech   Rate / Production: Normal    Volume:  Normal   Mood:    Anxious   Affect:    Worrisome   Thought Content:   Clear  Thought Form:  Coherent  Logical     Insight:    Fair     Plan:     Safety Plan: No current safety concerns identified.  Recommended that patient call 911 or go to the local ED should there be a change in any of these risk factors.     Barriers to treatment: None identified    Patient Contracts (see media tab):  None    Substance Use: Not addressed in session     Continue or Discharge: Patient will continue in 55+ Program (55+) as planned. Patient is likely to benefit from learning and using skills as they work toward the goals identified in their treatment plan.      ARLENE Savage  December 14, 2020  "

## 2020-12-14 NOTE — GROUP NOTE
Psychoeducation Group Note    PATIENT'S NAME: Jammie Mariee  MRN:   [id number]  :   1965  ACCT. NUMBER: [id number]  DATE OF SERVICE: 20  START TIME: 11:00 AM  END TIME: 11:50 AM  FACILITATOR: Alicia He RN  TOPIC: MH RN Group: Mental Health Maintenance  [facility] 55+ Program  TRACK: a1    NUMBER OF PARTICIPANTS: 6    Summary of Group / Topics Discussed:  Mental Health Maintenance:  Self- compassion: In this group patients viewed a brief video by Dr Taryn Lawson on the topic of self-esteem.  A brief review of the research highlighting the mental health benefits of practicing self- compassion will be discussed.  Pts will compare and contrast self esteem/self compassion. Pts will increase their understanding of the neurochemicals cortisol, oxytocin, and endorphins and their role in this topic.    Patient Session Goals / Objectives:    ? Pts will understand 3 concepts of self compassion  ? Pts will generate a short list of self compassion statements  ? Pts will practice verbalizing statements to the group                Patient Participation / Response:  Fully participated with the group by sharing personal reflections / insights and openly received / provided feedback with other participants.    Demonstrated understanding of topics discussed through group discussion and participation    Treatment Plan:  Patient has a current master individualized treatment plan.  See Epic treatment plan for more information.    Alicia He RN

## 2020-12-15 ENCOUNTER — HOSPITAL ENCOUNTER (OUTPATIENT)
Dept: BEHAVIORAL HEALTH | Facility: CLINIC | Age: 55
End: 2020-12-15
Attending: PSYCHIATRY & NEUROLOGY
Payer: COMMERCIAL

## 2020-12-15 PROCEDURE — 90853 GROUP PSYCHOTHERAPY: CPT | Mod: 95

## 2020-12-15 PROCEDURE — 90853 GROUP PSYCHOTHERAPY: CPT | Mod: GT

## 2020-12-15 PROCEDURE — G0177 OPPS/PHP; TRAIN & EDUC SERV: HCPCS | Mod: 95 | Performed by: OCCUPATIONAL THERAPIST

## 2020-12-15 NOTE — GROUP NOTE
Telemedicine Visit: The patient's condition can be safely assessed and treated via synchronous audio and visual telemedicine encounter.      Reason for Telemedicine Visit: Services only offered telehealth    Originating Site (Patient Location): Patient's home    Distant Site (Provider Location): Provider Remote Setting    Consent:  The patient/guardian has verbally consented to: the potential risks and benefits of telemedicine (video visit) versus in person care; bill my insurance or make self-payment for services provided; and responsibility for payment of non-covered services.     Mode of Communication:  Video Conference via [employer]    As the provider I attest to compliance with applicable laws and regulations related to telemedicine.  Psychotherapy Group Note    PATIENT'S NAME: Jammie Mariee  MRN:   [id number]  :   1965  ACCT. NUMBER: [id number]  DATE OF SERVICE: 12/15/20  START TIME: 10:00 AM  END TIME: 10:50 AM  FACILITATOR: Ermelinda Flynn LMFT  TOPIC: MH EBP Group: Relationship Skills  [facility] 55+ Program  TRACK: A1    NUMBER OF PARTICIPANTS: 7     Summary of Group / Topics Discussed:  Relationship Skills: Boundaries: Patients were provided with a general overview of interpersonal boundaries and how lack of boundaries relates to symptoms and functioning. The purpose is to help patients identify boundary issues and gain awareness and skills to work towards healthier interpersonal boundaries. Current awareness of healthy boundary characteristics and barriers to establishing healthy boundaries were discussed.    Patient Session Goals / Objectives:    Familiarized patients with the concept of interpersonal boundaries and their characteristics    Discussed and practiced strategies to promote healthier interpersonal boundaries    Identified boundary issues and identified plan to improve boundaries      Patient Participation / Response:  Minimally participated, only when prompted /  asked.    Demonstrated understanding of topics discussed through group discussion and participation and Demonstrated understanding of relationship skills and communication skills    Treatment Plan:  Patient has a current master individualized treatment plan.  See Epic treatment plan for more information.    ARLENE Savage

## 2020-12-15 NOTE — GROUP NOTE
Telemedicine Visit: The patient's condition can be safely assessed and treated via synchronous audio and visual telemedicine encounter.      Reason for Telemedicine Visit: Services only offered telehealth    Originating Site (Patient Location): Patient's home    Distant Site (Provider Location): Provider Remote Setting    Consent:  The patient/guardian has verbally consented to: the potential risks and benefits of telemedicine (video visit) versus in person care; bill my insurance or make self-payment for services provided; and responsibility for payment of non-covered services.     Mode of Communication:  Video Conference via [employer]    As the provider I attest to compliance with applicable laws and regulations related to telemedicine.  Process Group Note    VIOLET LIAO'S NAME: Jammie Mariee  MRN:   [id number]  :   1965  ACCT. NUMBER: [id number]  DATE OF SERVICE: 12/15/20  START TIME:  9:00 AM  END TIME:  9:50 AM  FACILITATOR: Ermelinda Flynn LMFT  TOPIC:  Process Group    Diagnoses:  296.53 Bipolar I Disorder Current or Most Recent Episode Depressed, Severe    4. Other Diagnoses that is relevant to services:   300.00 (F41.9) Unspecified Anxiety Disorder       [facility] 55+ Program  TRACK: A1    NUMBER OF PARTICIPANTS: 7          Data:    Session content: At the start of this group, patients were invited to check in by identifying themselves, describing their current emotional status, and identifying issues to address in this group.   Area(s) of treatment focus addressed in this session included Symptom Management.  Processed continued to feel stressed and overwhelmed with fatigue and anxiety. Explored the skills that are benefitting her and the process of progress towards using skills. Explored skills to use to cope with ruminating thoughts.    Therapeutic Interventions/Treatment Strategies:  Psychotherapist offered support, feedback and validation and reinforced use of skills. Treatment  "modalities used include Cognitive Behavioral Therapy. Interventions include Coping Skills: Assisted patient in identifying 1-2 healthy distraction skills to reduce overall distress and Discussed how the use of intentional \"in the moment\" actions can help reduce distress.    Assessment:    Patient response:   Patient responded to session by accepting feedback, giving feedback, listening and verbalizing understanding    Possible barriers to participation / learning include: and no barriers identified    Health Issues:   None reported       Substance Use Review:   Substance Use: No active concerns identified.    Mental Status/Behavioral Observations  Appearance:   Appropriate   Eye Contact:   Good   Psychomotor Behavior: Normal   Attitude:   Cooperative   Orientation:   All  Speech   Rate / Production: Normal    Volume:  Normal   Mood:    Anxious   Affect:    Appropriate  Worrisome   Thought Content:   Clear  Thought Form:  Coherent  Logical     Insight:    Fair     Plan:     Safety Plan: No current safety concerns identified.  Recommended that patient call 911 or go to the local ED should there be a change in any of these risk factors.     Barriers to treatment: None identified    Patient Contracts (see media tab):  None    Substance Use: Not addressed in session     Continue or Discharge: Patient will continue in 55+ Program (55+) as planned. Patient is likely to benefit from learning and using skills as they work toward the goals identified in their treatment plan.      ARLENE Savage  December 15, 2020  "

## 2020-12-15 NOTE — GROUP NOTE
Psychoeducation Group Note    PATIENT'S NAME: Jammie Mariee  MRN:   [id number]  :   1965  ACCT. NUMBER: [id number]  DATE OF SERVICE: 12/15/20  START TIME: 11:00 AM  END TIME: 11:50 AM  FACILITATOR: Roseanne Valdez OTR/L  TOPIC: MH Life Skills Group: Lifestyle Balance and Structure  [facility] 55+ Program  TRACK: A1    NUMBER OF PARTICIPANTS: 7    Telemedicine Visit: The patient's condition can be safely assessed and treated via synchronous audio and visual telemedicine encounter.      Reason for Telemedicine Visit: Services only offered telehealth    Originating Site (Patient Location): Patient's home    Distant Site (Provider Location): [facility] [place] [address]    Consent:  The patient/guardian has verbally consented to: the potential risks and benefits of telemedicine (video visit) versus in person care; bill my insurance or make self-payment for services provided; and responsibility for payment of non-covered services.     Mode of Communication:  Video Conference via [employer]    As the provider I attest to compliance with applicable laws and regulations related to telemedicine.     Summary of Group / Topics Discussed:  Lifestyle Balance and Strucure:  Routines, Habits, Rituals, and Roles: Patients were assisted to identify meaningful routines that they want to promote and the impact their mental health symptoms have on this.  Patients learned, applied, and generalized skills needed to live and participate in meaningful routines as effectively and independently as possible.  Patients developed awareness of strengths and challenges in fulfilling these routnies and worked on integrating specific and individualized routines and habits into their daily life.     Patient Session Goals / Objectives:    Improved awareness and engagement in life s meaningful roles, routines, habits, and rituals    Explored and identified current routines and challenges due to mental  health symptoms     Identified ways to establish and integrate daily self care and wellness routines and habits to support mental health recovery    Practiced and reflected on how to generalize taught skills to their everyday life        Patient Participation / Response:  Fully participated with the group by sharing personal reflections / insights and openly received / provided feedback with other participants.    Patient presentation: constricted affect; active in group discussion , Demonstrated understanding of content through sharing barriers she encounters related to routine building    and Patient would benefit from additional opportunities to practice the content to be able to generalize it to their everyday life with increased intentionality, consistency, and efficacy in support of their psychiatric recovery    Treatment Plan:  Patient has a current master individualized treatment plan.  See Epic treatment plan for more information.    Roseanne Valdez, OTR/L

## 2020-12-16 NOTE — PROGRESS NOTES
Patient Active Problem List   Diagnosis     Bipolar 1 disorder, depressed (H)       Current Outpatient Medications:      divalproex sodium extended-release (DEPAKOTE ER) 250 MG 24 hr tablet, Take 1,000 mg by mouth, Disp: , Rfl:      hydrOXYzine (ATARAX) 25 MG tablet, Take 25 mg by mouth, Disp: , Rfl:      lamoTRIgine (LAMICTAL) 25 MG tablet, Take 1 tab daily for 6 days, then 2 tabs daily for 14 days, then 4 tabs daily for 10 days, Disp: , Rfl:      QUEtiapine (SEROQUEL) 50 MG tablet, Take 7.5 tablets at bedtime and 0.5 tablets daily., Disp: , Rfl:   Psychiatry staffing: case discussed  Diagnosis:  As above, and medical COPD.  Anxiety linked to COPD

## 2020-12-16 NOTE — ADDENDUM NOTE
Encounter addended by: Johnie Grayson MD on: 12/16/2020 8:32 AM   Actions taken: Clinical Note Signed

## 2020-12-17 ENCOUNTER — HOSPITAL ENCOUNTER (OUTPATIENT)
Dept: BEHAVIORAL HEALTH | Facility: CLINIC | Age: 55
End: 2020-12-17
Attending: PSYCHIATRY & NEUROLOGY
Payer: COMMERCIAL

## 2020-12-17 PROCEDURE — 90853 GROUP PSYCHOTHERAPY: CPT | Mod: GT

## 2020-12-17 PROCEDURE — G0177 OPPS/PHP; TRAIN & EDUC SERV: HCPCS | Mod: GT

## 2020-12-17 PROCEDURE — G0177 OPPS/PHP; TRAIN & EDUC SERV: HCPCS | Mod: 95 | Performed by: OCCUPATIONAL THERAPIST

## 2020-12-17 NOTE — GROUP NOTE
Psychoeducation Group Note    PATIENT'S NAME: Jammie Mariee  MRN:   [id number]  :   1965  ACCT. NUMBER: [id number]  DATE OF SERVICE: 20  START TIME:  9:00 AM  END TIME:  9:50 AM  FACILITATOR: Glenna Aguilera RN  TOPIC: DELMAR RN Group: Mental Health Maintenance  [facility] 55+ Program  TRACK: A1    NUMBER OF PARTICIPANTS: 6    Telemedicine Visit: The patient's condition can be safely assessed and treated via synchronous audio and visual telemedicine encounter.      Reason for Telemedicine Visit: Services only offered telehealth    Originating Site (Patient Location): Patient's home    Distant Site (Provider Location): Provider Remote Setting    Consent:  The patient/guardian has verbally consented to: the potential risks and benefits of telemedicine (video visit) versus in person care; bill my insurance or make self-payment for services provided; and responsibility for payment of non-covered services.     Mode of Communication:  Video Conference via [employer]    As the provider I attest to compliance with applicable laws and regulations related to telemedicine.    Summary of Group / Topics Discussed:  Mental Health Maintenance:  Anatomy of Trust: In this group, the concept of trust was explored through the viewing, discussion, and self-reflection of the Juju Collins Talk Titled,  The Anatomy of Trust.      Patient Session Goals / Objectives:  ? Defined and described definition of trust  ? Identified 2 or more components of trust  ? Reflect on trusting relationships and trust in oneself       Patient Participation / Response:  Fully participated with the group by sharing personal reflections / insights and openly received / provided feedback with other participants.    Demonstrated understanding of topics discussed through group discussion and participation, Identified / Expressed personal readiness to practice skills and Verbalized understanding of mental health maintenance topic    Treatment  Plan:  Patient has a current master individualized treatment plan.  See Epic treatment plan for more information.    Glenna Aguilera RN

## 2020-12-17 NOTE — PROGRESS NOTES
Acknowledgement of Current Treatment Plan       I have reviewed my treatment plan with my therapist on 12/17/20.   I agree with the plan as it is written in the electronic health record. (A1 Track)    Name:                   Signature:  Jammie Mariee         Can not sign due to covid   Dr Johnie Grayson MD  Psychiatrist    Ermelinda Flynn, [facility]  Psychotherapist   Ermelinda Flynn LMFT 12/18/20 electronic signature

## 2020-12-17 NOTE — GROUP NOTE
Telemedicine Visit: The patient's condition can be safely assessed and treated via synchronous audio and visual telemedicine encounter.      Reason for Telemedicine Visit: Services only offered telehealth    Originating Site (Patient Location): Patient's home    Distant Site (Provider Location): Provider Remote Setting    Consent:  The patient/guardian has verbally consented to: the potential risks and benefits of telemedicine (video visit) versus in person care; bill my insurance or make self-payment for services provided; and responsibility for payment of non-covered services.     Mode of Communication:  Video Conference via [employer]    As the provider I attest to compliance with applicable laws and regulations related to telemedicine.  Process Group Note    PATIENT'S NAME: Jammie Mariee  MRN:   [id number]  :   1965  ACCT. NUMBER: [id number]  DATE OF SERVICE: 20  START TIME: 11:00 AM  END TIME: 11:50 AM  FACILITATOR: Ermelinda Flynn LMFT  TOPIC:  Process Group    Diagnoses:  296.53 Bipolar I Disorder Current or Most Recent Episode Depressed, Severe    4. Other Diagnoses that is relevant to services:   300.00 (F41.9) Unspecified Anxiety Disorder       [facility] 55+ Program  TRACK: A1    NUMBER OF PARTICIPANTS: 7          Data:    Session content: At the start of this group, patients were invited to check in by identifying themselves, describing their current emotional status, and identifying issues to address in this group.   Area(s) of treatment focus addressed in this session included Symptom Management.  Processed feeling a little more stable then previous weeks. Reports she is feeling more confident in using skills. Identifies feeling a little more energy and wanting to spend more time with friends and family. Explored ways to stay connected without putting self at risk. Explored ways to cope with anxiety and COPD. No safety concerns.     Therapeutic Interventions/Treatment  "Strategies:  Psychotherapist offered support, feedback and validation and reinforced use of skills. Treatment modalities used include Cognitive Behavioral Therapy. Interventions include Coping Skills: Discussed how the use of intentional \"in the moment\" actions can help reduce distress and Assisted patient in understanding the purpose of planning / creating / participating / sharing in positive experiences.    Assessment:    Patient response:   Patient responded to session by accepting feedback, giving feedback, listening and verbalizing understanding    Possible barriers to participation / learning include: and no barriers identified    Health Issues:   None reported       Substance Use Review:   Substance Use: No active concerns identified.    Mental Status/Behavioral Observations  Appearance:   Appropriate   Eye Contact:   Good   Psychomotor Behavior: Normal   Attitude:   Cooperative   Orientation:   All  Speech   Rate / Production: Normal    Volume:  Normal   Mood:    Anxious   Affect:    Worrisome   Thought Content:   Clear  Thought Form:  Coherent  Logical     Insight:    Fair     Plan:     Safety Plan: No current safety concerns identified.  Recommended that patient call 911 or go to the local ED should there be a change in any of these risk factors.     Barriers to treatment: None identified    Patient Contracts (see media tab):  None    Substance Use: Not addressed in session     Continue or Discharge: Patient will continue in 55+ Program (55+) as planned. Patient is likely to benefit from learning and using skills as they work toward the goals identified in their treatment plan.      ARLENE Savage  December 17, 2020  "

## 2020-12-17 NOTE — GROUP NOTE
Psychoeducation Group Note    PATIENT'S NAME: Jammie Mariee  MRN:   [id number]  :   1965  ACCT. NUMBER: [id number]  DATE OF SERVICE: 20  START TIME: 10:00 AM  END TIME: 10:50 AM  FACILITATOR: Roseanne Valdez OTR/L  TOPIC: MH Life Skills Group: Lifestyle Balance and Structure  [facility] 55+ Program  TRACK: A1    NUMBER OF PARTICIPANTS: 7    Telemedicine Visit: The patient's condition can be safely assessed and treated via synchronous audio and visual telemedicine encounter.      Reason for Telemedicine Visit: Services only offered telehealth    Originating Site (Patient Location): Patient's home    Distant Site (Provider Location): [facility] Setting: Day Treatment [facility] [place]    Consent:  The patient/guardian has verbally consented to: the potential risks and benefits of telemedicine (video visit) versus in person care; bill my insurance or make self-payment for services provided; and responsibility for payment of non-covered services.     Mode of Communication:  Video Conference via Zoom    As the provider I attest to compliance with applicable laws and regulations related to telemedicine.     Summary of Group / Topics Discussed:  Lifestyle Balance and Strucure:  Occupations: Patients were provided with an overview on the importance of daily occupations in support of mental health management.  Patients were assisted to establish, restore, and/or modify performance skills and patterns for improved engagement and promotion of positive mental health through meaningful occupations.  Patients gained awareness of the connection between who they are (self identity) with what they do.  Patients identified a task they either needed to, wanted to, or had to complete and then spent time engaging in this activity during the session.  Patients then reported on progress made, barriers encountered, and feelings as a result.  Discussed how to apply knowledge learned and the experience to  everyday life.     Patient Session Goals / Objectives:    Increased awareness of how patient s functioning in identified meaningful occupations are impacted by their mental health status     Developed performance skills and performance patterns to enhance occupational engagement    Practiced setting small goals for themselves, working on this, and then reflecting on this process    Explored ways to generalize new awareness and skills to their everyday life        Patient Participation / Response:  Fully participated with the group by sharing personal reflections / insights and openly received / provided feedback with other participants.    Patient presentation: constricted affect which brightened by the end of group; active participant in group, Demonstrated understanding of content through identifying a task that she has trouble engaging in on her own and worked on this during the session; Jammie reported successful completion of goal and positive feelings related to the experience at the end of the sesssion and Patient would benefit from additional opportunities to practice the content to be able to generalize it to their everyday life with increased intentionality, consistency, and efficacy in support of their psychiatric recovery    Treatment Plan:  Patient has a current master individualized treatment plan.  See Epic treatment plan for more information.    Roseanne Valdez, OTR/L

## 2020-12-21 ENCOUNTER — HOSPITAL ENCOUNTER (OUTPATIENT)
Dept: BEHAVIORAL HEALTH | Facility: CLINIC | Age: 55
End: 2020-12-21
Attending: PSYCHIATRY & NEUROLOGY
Payer: COMMERCIAL

## 2020-12-21 PROCEDURE — 90853 GROUP PSYCHOTHERAPY: CPT | Mod: GT

## 2020-12-21 PROCEDURE — G0177 OPPS/PHP; TRAIN & EDUC SERV: HCPCS | Mod: GT | Performed by: OCCUPATIONAL THERAPIST

## 2020-12-21 PROCEDURE — 90853 GROUP PSYCHOTHERAPY: CPT | Mod: 95

## 2020-12-21 NOTE — GROUP NOTE
Telemedicine Visit: The patient's condition can be safely assessed and treated via synchronous audio and visual telemedicine encounter.      Reason for Telemedicine Visit: Services only offered telehealth    Originating Site (Patient Location): Patient's home    Distant Site (Provider Location): Provider Remote Setting    Consent:  The patient/guardian has verbally consented to: the potential risks and benefits of telemedicine (video visit) versus in person care; bill my insurance or make self-payment for services provided; and responsibility for payment of non-covered services.     Mode of Communication:  Video Conference via [employer]    As the provider I attest to compliance with applicable laws and regulations related to telemedicine.    Psychotherapy Group Note    PATIENT'S NAME: Jammie Mariee  MRN:   [id number]  :   1965  ACCT. NUMBER: [id number]  DATE OF SERVICE: 20  START TIME: 11:00 AM  END TIME: 11:50 AM  FACILITATOR: Ermelinda Flynn LMFT  TOPIC: MH EBP Group: Specialty Awareness  [place] [address]+ Program  TRACK: A1    NUMBER OF PARTICIPANTS: 4    Summary of Group / Topics Discussed:  Specialty Topics: Life Transitions: The topic of life transitions was presented in order to help patients to better understand the challenges presented by life transitions, and how to best navigate them. Exploring the phases of transition and how one works through them was discussed. Patients were provided with information regarding community resources.     Patient Session Goals / Objectives:    Discussed the timing and nature of major life transitions    Explored how life transitions may impact mental health and functioning    Discussed coping strategies to manage symptoms and help with transitioning    Discussed and planned a successful transition        Patient Participation / Response:  Moderately participated, sharing some personal reflections / insights and adequately adequately received / provided  feedback with other participants.    Demonstrated understanding of topics discussed through group discussion and participation and Practiced skills in session    Treatment Plan:  Patient has a current master individualized treatment plan.  See Epic treatment plan for more information.    ARLENE Savage

## 2020-12-21 NOTE — GROUP NOTE
Telemedicine Visit: The patient's condition can be safely assessed and treated via synchronous audio and visual telemedicine encounter.      Reason for Telemedicine Visit: Services only offered telehealth    Originating Site (Patient Location): Patient's home    Distant Site (Provider Location): Provider Remote Setting    Consent:  The patient/guardian has verbally consented to: the potential risks and benefits of telemedicine (video visit) versus in person care; bill my insurance or make self-payment for services provided; and responsibility for payment of non-covered services.     Mode of Communication:  Video Conference via [employer]    As the provider I attest to compliance with applicable laws and regulations related to telemedicine.  Process Group Note    PATIENT'S NAME: Jammie Mariee  MRN:   [id number]  :   1965  ACCT. NUMBER: [id number]  DATE OF SERVICE: 20  START TIME:  9:00 AM  END TIME:  9:50 AM  FACILITATOR: Ermelinda Flynn LMFT  TOPIC:  Process Group    Diagnoses:  296.53 Bipolar I Disorder Current or Most Recent Episode Depressed, Severe    4. Other Diagnoses that is relevant to services:   300.00 (F41.9) Unspecified Anxiety Disorder       [facility] 55+ Program  TRACK: A1    NUMBER OF PARTICIPANTS: 5          Data:    Session content: At the start of this group, patients were invited to check in by identifying themselves, describing their current emotional status, and identifying issues to address in this group.   Area(s) of treatment focus addressed in this session included Symptom Management.  Processed struggling over the weekend with panic attacks and anxiety. Reports symptoms are cosnsequences of her copd where she struggles to breath naturally. Reports pushing herself to get things accomplished and experienced more moments of panic. Explored ways to continue to use skills and manage her energy levels. No safety concerns.     Therapeutic Interventions/Treatment  "Strategies:  Psychotherapist offered support, feedback and validation and reinforced use of skills. Treatment modalities used include Cognitive Behavioral Therapy. Interventions include Coping Skills: Discussed how the use of intentional \"in the moment\" actions can help reduce distress.    Assessment:    Patient response:   Patient responded to session by accepting feedback, listening and verbalizing understanding    Possible barriers to participation / learning include: and no barriers identified    Health Issues:   None reported       Substance Use Review:   Substance Use: No active concerns identified.    Mental Status/Behavioral Observations  Appearance:   Appropriate   Eye Contact:   Good   Psychomotor Behavior: Normal   Attitude:   Cooperative   Orientation:   All  Speech   Rate / Production: Normal    Volume:  Normal   Mood:    Anxious   Affect:    Worrisome   Thought Content:   Clear  Thought Form:  Coherent  Logical     Insight:    Poor     Plan:     Safety Plan: No current safety concerns identified.  Recommended that patient call 911 or go to the local ED should there be a change in any of these risk factors.     Barriers to treatment: None identified    Patient Contracts (see media tab):  None    Substance Use: Not addressed in session     Continue or Discharge: Patient will continue in 55+ Program (55+) as planned. Patient is likely to benefit from learning and using skills as they work toward the goals identified in their treatment plan.      ARLENE Savage  December 21, 2020  "

## 2020-12-21 NOTE — GROUP NOTE
Psychoeducation Group Note    PATIENT'S NAME: Jammie Mariee  MRN:   [id number]  :   1965  ACCT. NUMBER: [id number]  DATE OF SERVICE: 20  START TIME: 10:00 AM  END TIME: 10:50 AM  FACILITATOR: Roseanne Valdez OTR/L  TOPIC: MH Life Skills Group: Sensory Approaches in Mental Health  [facility]+ [facility]  TRACK: A1    NUMBER OF PARTICIPANTS: 5    Telemedicine Visit: The patient's condition can be safely assessed and treated via synchronous audio and visual telemedicine encounter.      Reason for Telemedicine Visit: Services only offered telehealth    Originating Site (Patient Location): Patient's home    Distant Site (Provider Location): [facility] [address] [place] [address]    Consent:  The patient/guardian has verbally consented to: the potential risks and benefits of telemedicine (video visit) versus in person care; bill my insurance or make self-payment for services provided; and responsibility for payment of non-covered services.     Mode of Communication:  Video Conference via Zoom    As the provider I attest to compliance with applicable laws and regulations related to telemedicine.     Summary of Group / Topics Discussed:  Sensory Approaches in Mental Health:  Self Regulation Through Sensory Modulation:  Tune into the Body:  Patients were provided with education on Autonomic Nervous System activation and taught skills that can be used immediately to help them calm down and regain self control.  Discussed connection between physical sensations experienced in the body and emotional experience.  Patients were taught how to recognize specific signs and symptoms of their individualized state of arousal and how to make changes when needed.  Patients discussed coping skills and techniques to help manage symptoms and change level of arousal when needed to be in control and comfortable so they are able to function in different environments.       Patient Session Goals /  Objectives:    Identified specific and individualized neurosensory skills to help when needed     Identified signs and symptoms of current level of arousal and ways to make changes in level of arousal when needed    Discussed ways to increase awareness of physical sensations and connecting these to their emotional experience    Established a plan for practice of these skills in their own environments        Patient Participation / Response:  Fully participated with the group by sharing personal reflections / insights and openly received / provided feedback with other participants.    Patient presentation: constricted affect; active in group sharing insights with the group; adequate concentration observed, Verbalized understanding of content and Patient would benefit from additional opportunities to practice the content to be able to generalize it to their everyday life with increased intentionality, consistency, and efficacy in support of their psychiatric recovery    Treatment Plan:  Patient has a current master individualized treatment plan.  See Epic treatment plan for more information.    Roseanne Valdez, OTR/L

## 2020-12-22 ENCOUNTER — HOSPITAL ENCOUNTER (OUTPATIENT)
Dept: BEHAVIORAL HEALTH | Facility: CLINIC | Age: 55
End: 2020-12-22
Attending: PSYCHIATRY & NEUROLOGY
Payer: COMMERCIAL

## 2020-12-22 PROCEDURE — G0177 OPPS/PHP; TRAIN & EDUC SERV: HCPCS | Mod: 95

## 2020-12-22 PROCEDURE — 90853 GROUP PSYCHOTHERAPY: CPT | Mod: 95

## 2020-12-22 PROCEDURE — G0177 OPPS/PHP; TRAIN & EDUC SERV: HCPCS | Mod: 95 | Performed by: OCCUPATIONAL THERAPIST

## 2020-12-22 NOTE — GROUP NOTE
Telemedicine Visit: The patient's condition can be safely assessed and treated via synchronous audio and visual telemedicine encounter.      Reason for Telemedicine Visit: Services only offered telehealth    Originating Site (Patient Location): Patient's home    Distant Site (Provider Location): Provider Remote Setting    Consent:  The patient/guardian has verbally consented to: the potential risks and benefits of telemedicine (video visit) versus in person care; bill my insurance or make self-payment for services provided; and responsibility for payment of non-covered services.     Mode of Communication:  Video Conference via [employer]    As the provider I attest to compliance with applicable laws and regulations related to telemedicine.  Process Group Note    PATIENT'S NAME: Jammie Mariee  MRN:   [id number]  :   1965  ACCT. NUMBER: [id number]  DATE OF SERVICE: 20  START TIME: 10:00 AM  END TIME: 10:50 AM  FACILITATOR: Ermelinda Flynn LMFT  TOPIC:  Process Group    Diagnoses:  296.53 Bipolar I Disorder Current or Most Recent Episode Depressed, Severe    4. Other Diagnoses that is relevant to services:   300.00 (F41.9) Unspecified Anxiety Disorder       [facility] 55+ Program  TRACK: A1    NUMBER OF PARTICIPANTS: 5          Data:    Session content: At the start of this group, patients were invited to check in by identifying themselves, describing their current emotional status, and identifying issues to address in this group.   Area(s) of treatment focus addressed in this session included Symptom Management.  Processed feeling less anxiety then the previous day. Reports having structure of group helps reduce her anxiety. Explored ways to maintain structure for days she is not in group. Reports losing a significant amount of weight due to copd and anxiety. Reviewed ways to imrpove her health and the impact on her mental health. No safety concerns.     Therapeutic Interventions/Treatment  "Strategies:  Psychotherapist offered support, feedback and validation and reinforced use of skills. Treatment modalities used include Cognitive Behavioral Therapy. Interventions include Behavioral Activation: Encouraged strategies to reduce individual procrastination and increase motivation by increasing goal-directed activities to enhance mood and reduce symptoms. and Coping Skills: Discussed use of self-soothe skills to decrease distress in the body and Discussed how the use of intentional \"in the moment\" actions can help reduce distress.    Assessment:    Patient response:   Patient responded to session by accepting feedback, listening and verbalizing understanding    Possible barriers to participation / learning include: and no barriers identified    Health Issues:   None reported       Substance Use Review:   Substance Use: No active concerns identified.    Mental Status/Behavioral Observations  Appearance:   Appropriate   Eye Contact:   Good   Psychomotor Behavior: Normal   Attitude:   Cooperative   Orientation:   All  Speech   Rate / Production: Normal    Volume:  Normal   Mood:    Anxious   Affect:    Worrisome   Thought Content:   Clear  Thought Form:  Coherent  Logical     Insight:    Fair     Plan:     Safety Plan: No current safety concerns identified.  Recommended that patient call 911 or go to the local ED should there be a change in any of these risk factors.     Barriers to treatment: None identified    Patient Contracts (see media tab):  None    Substance Use: Not addressed in session     Continue or Discharge: Patient will continue in 55+ Program (55+) as planned. Patient is likely to benefit from learning and using skills as they work toward the goals identified in their treatment plan.      ARLENE Savage  December 22, 2020  "

## 2020-12-22 NOTE — GROUP NOTE
Psychoeducation Group Note    PATIENT'S NAME: Jammie Mariee  MRN:   [id number]  :   1965  ACCT. NUMBER: [id number]  DATE OF SERVICE: 20  START TIME: 11:00 AM  END TIME: 11:50 AM  FACILITATOR: Roseanne Valdez OTR/L  TOPIC: MH Life Skills Group: Lifestyle Balance and Structure  [facility] 55+ Program  TRACK: A1    NUMBER OF PARTICIPANTS: 5    Telemedicine Visit: The patient's condition can be safely assessed and treated via synchronous audio and visual telemedicine encounter.      Reason for Telemedicine Visit: Services only offered telehealth    Originating Site (Patient Location): Patient's home    Distant Site (Provider Location): [facility] [address] [place] [address]    Consent:  The patient/guardian has verbally consented to: the potential risks and benefits of telemedicine (video visit) versus in person care; bill my insurance or make self-payment for services provided; and responsibility for payment of non-covered services.     Mode of Communication:  Video Conference via Zoom    As the provider I attest to compliance with applicable laws and regulations related to telemedicine.     Summary of Group / Topics Discussed:  Lifestyle Balance and Strucure:  Goal-setting & integration Practice: Patients identified a particular task that they either wanted to/needed to/had to do and spent time in group working on this in a supportive environment as a way to support their ability to follow through with meaningful personal, health, recovery and treatment goals that they would like to achieve to improve overall functioning.  Patients then shared their experience, progress made, and any barriers/difficulties they encountered with the group.  Assisted with problem solving and provided support/validation.  Discussed how to generalize the experience to everyday life to improve overall follow through on goals and task engagement/completion.       Patient Session Goals /  Objectives:    Identified and worked on a meaningful task/activity they either needed to/wanted to/had to complete during the session as a way to engage in meaningful activities of daily living     Identified and problem solved barriers to achieving goals     Identified plan to support follow through on goals and reflection on progress made    Generalized skills/strategies learned to everyday life and how to practice this outside of group          Patient Participation / Response:  Fully participated with the group by sharing personal reflections / insights and openly received / provided feedback with other participants.    Patient presentation: constricted affect; low energy reported and observed, Demonstrated understanding of content through working on eating something despite no appetite and low energy; reported she was successful with her goal  and Patient would benefit from additional opportunities to practice the content to be able to generalize it to their everyday life with increased intentionality, consistency, and efficacy in support of their psychiatric recovery    Treatment Plan:  Patient has a current master individualized treatment plan.  See Epic treatment plan for more information.    Roseanne Valdez, OTR/L

## 2020-12-28 ENCOUNTER — HOSPITAL ENCOUNTER (OUTPATIENT)
Dept: BEHAVIORAL HEALTH | Facility: CLINIC | Age: 55
End: 2020-12-28
Attending: PSYCHIATRY & NEUROLOGY
Payer: COMMERCIAL

## 2020-12-28 PROCEDURE — G0177 OPPS/PHP; TRAIN & EDUC SERV: HCPCS | Mod: GT

## 2020-12-28 PROCEDURE — 90853 GROUP PSYCHOTHERAPY: CPT | Mod: GT

## 2020-12-28 PROCEDURE — 90853 GROUP PSYCHOTHERAPY: CPT | Mod: 95

## 2020-12-28 NOTE — GROUP NOTE
Psychoeducation Group Note    PATIENT'S NAME: Jammie Mariee  MRN:   [id number]  :   1965  ACCT. NUMBER: [id number]  DATE OF SERVICE: 20  START TIME: 10:00 AM  END TIME: 10:50 AM  FACILITATOR: Alicia He RN  TOPIC: MH Life Skills Group: Sensory Approaches in Mental Health  [facility] 55+ Program  TRACK: a1    NUMBER OF PARTICIPANTS: 6    Summary of Group / Topics Discussed:  Sensory Approaches in Mental Health:  Focused Activity: Patients were provided with verbal and experiential education to identify physical and sensorimotor based activities that can be utilized for stress management, self care, health maintenance, and self regulation.  Patients increased knowledge and awareness of activities that support good self care, build resiliency, and prevent relapse through healthy engagement in mindful focused activities for effective coping with illness and reduction of maladaptive coping skills.     Patient Session Goals / Objectives:    Identified specific physical and sensorimotor based activities for stress management, self care, health maintenance, and self regulation      Improved awareness of activities that are meaningful focused activities that support good self care, build resiliency, and prevent relapse and how this applies to current daily life    Established a plan for practice of these skills in their own environments    Practiced and reflected on how to generalize taught skills to their everyday life  Telemedicine Visit: The patient's condition can be safely assessed and treated via synchronous audio and visual telemedicine encounter.      Reason for Telemedicine Visit: Services only offered telehealth    Originating Site (Patient Location): Patient's home    Distant Site (Provider Location): Provider Remote Setting    Consent:  The patient/guardian has verbally consented to: the potential risks and benefits of telemedicine (video visit) versus in person care; bill my  insurance or make self-payment for services provided; and responsibility for payment of non-covered services.     Mode of Communication:  Video Conference via [employer]    As the provider I attest to compliance with applicable laws and regulations related to telemedicine.       Patient Participation / Response:  Fully participated with the group by sharing personal reflections / insights and openly received / provided feedback with other participants.    Verbalized understanding of content    Treatment Plan:  Patient has a current master individualized treatment plan.  See Epic treatment plan for more information.    Alicia He RN

## 2020-12-28 NOTE — GROUP NOTE
Telemedicine Visit: The patient's condition can be safely assessed and treated via synchronous audio and visual telemedicine encounter.      Reason for Telemedicine Visit: Services only offered telehealth    Originating Site (Patient Location): Patient's home    Distant Site (Provider Location): Provider Remote Setting    Consent:  The patient/guardian has verbally consented to: the potential risks and benefits of telemedicine (video visit) versus in person care; bill my insurance or make self-payment for services provided; and responsibility for payment of non-covered services.     Mode of Communication:  Video Conference via GINKG[employer]    As the provider I attest to compliance with applicable laws and regulations related to telemedicine.  Process Group Note    PATIENT'S NAME: Jammie Mariee  MRN:   [id number]  :   1965  ACCT. NUMBER: [id number]  DATE OF SERVICE: 20  START TIME:  9:00 AM  END TIME:  9:50 AM  FACILITATOR: Ermelinda Flynn LMFT  TOPIC:  Process Group    Diagnoses:  296.53 Bipolar I Disorder Current or Most Recent Episode Depressed, Severe    4. Other Diagnoses that is relevant to services:   300.00 (F41.9) Unspecified Anxiety Disorder       [facility] 55+ Program  TRACK: A1    NUMBER OF PARTICIPANTS: 6          Data:    Session content: At the start of this group, patients were invited to check in by identifying themselves, describing their current emotional status, and identifying issues to address in this group.   Area(s) of treatment focus addressed in this session included Symptom Management.  Processed feeling supported at home with her family there and having less symptoms of anxiety. Reports she is struggling with not smoking and feeling anxious about craving cigarettes. Explored ways to reduce smoking and to use grounding skills when anxiety arises. Reports better able to use skills with support of family. No safety concerns.     Therapeutic Interventions/Treatment  "Strategies:  Psychotherapist offered support, feedback and validation and reinforced use of skills. Treatment modalities used include Cognitive Behavioral Therapy. Interventions include Coping Skills: Discussed how the use of intentional \"in the moment\" actions can help reduce distress and Facilitated understanding of  what factors may contribute to symptom relapse and skills plan to manage symptom relapse .    Assessment:    Patient response:   Patient responded to session by accepting feedback, giving feedback, listening and verbalizing understanding    Possible barriers to participation / learning include: and no barriers identified    Health Issues:   None reported       Substance Use Review:   Substance Use: No active concerns identified.    Mental Status/Behavioral Observations  Appearance:   Appropriate   Eye Contact:   Good   Psychomotor Behavior: Normal   Attitude:   Cooperative   Orientation:   All  Speech   Rate / Production: Normal    Volume:  Normal   Mood:    Anxious   Affect:    Worrisome   Thought Content:   Clear  Thought Form:  Coherent  Logical     Insight:    Fair     Plan:     Safety Plan: No current safety concerns identified.  Recommended that patient call 911 or go to the local ED should there be a change in any of these risk factors.     Barriers to treatment: None identified    Patient Contracts (see media tab):  None    Substance Use: Not addressed in session     Continue or Discharge: Patient will continue in 55+ Program (55+) as planned. Patient is likely to benefit from learning and using skills as they work toward the goals identified in their treatment plan.      ARLENE Savage  December 28, 2020  "

## 2020-12-28 NOTE — GROUP NOTE
Telemedicine Visit: The patient's condition can be safely assessed and treated via synchronous audio and visual telemedicine encounter.      Reason for Telemedicine Visit: Services only offered telehealth    Originating Site (Patient Location): Patient's home    Distant Site (Provider Location): Provider Remote Setting    Consent:  The patient/guardian has verbally consented to: the potential risks and benefits of telemedicine (video visit) versus in person care; bill my insurance or make self-payment for services provided; and responsibility for payment of non-covered services.     Mode of Communication:  Video Conference via [email or web address]    As the provider I attest to compliance with applicable laws and regulations related to telemedicine.  Psychotherapy Group Note    PATIENT'S NAME: Jammie Mariee  MRN:   [id number]  :   1965  ACCT. NUMBER: [id number]  DATE OF SERVICE: 20  START TIME: 11:00 AM  END TIME: 11:50 AM  FACILITATOR: Ermelinda Flynn LMFT  TOPIC:  EBP Group: Symptom Awareness  [facility] 55+ Program  TRACK: A1    NUMBER OF PARTICIPANTS: 5    Summary of Group / Topics Discussed:  Symptom Awareness: Mood Disorders: Patients received a general overview of mood disorders including depressive disorders, anxiety disorders, and bipolar disorders and how it relates to their current symptoms. The purpose is to promote understanding of their diagnoses and how it impacts their functioning. Patients reviewed their current awareness of symptoms and diagnoses. Patients received information regarding diagnoses, etiology, cultural, and environmental factors as well as impact on functioning.     Patient Session Goals / Objectives:    Discussed patient individual symptoms and experiences    Reviewed diagnostic criteria and etiology of diagnoses         Patient Participation / Response:  Moderately participated, sharing some personal reflections / insights and adequately adequately received / provided  feedback with other participants.    Demonstrated understanding of topics discussed through group discussion and participation, Demonstrated understanding of how information regarding symptoms can assist in management of symptoms and Practiced skills in session    Treatment Plan:  Patient has a current master individualized treatment plan.  See Epic treatment plan for more information.    ARLENE Savage

## 2020-12-29 ENCOUNTER — HOSPITAL ENCOUNTER (OUTPATIENT)
Dept: BEHAVIORAL HEALTH | Facility: CLINIC | Age: 55
End: 2020-12-29
Attending: PSYCHIATRY & NEUROLOGY
Payer: COMMERCIAL

## 2020-12-29 PROCEDURE — 90853 GROUP PSYCHOTHERAPY: CPT | Mod: 95

## 2020-12-29 PROCEDURE — G0177 OPPS/PHP; TRAIN & EDUC SERV: HCPCS | Mod: GT

## 2020-12-29 NOTE — GROUP NOTE
Psychoeducation Group Note    PATIENT'S NAME: Jammie Mariee  MRN:   [id number]  :   1965  ACCT. NUMBER: [id number]  DATE OF SERVICE: 20  START TIME: 10:00 AM  END TIME: 10:50 AM  FACILITATOR: Alicia He RN  TOPIC: MH Life Skills Group: Sensory Approaches in Mental Health  [facility] 55+ Program  TRACK: a1    NUMBER OF PARTICIPANTS: 6    Summary of Group / Topics Discussed:  Sensory Approaches in Mental Health:  Mindfulness Focused Activity:  Yantra: Patients were provided with an experiential opportunity to use a geometric pattern - Yantra or Mandala- as a way of achieving relaxation and as a tool for stress management.  Patients increased their knowledge and awareness of the benefits of relaxation as a stress management tool and were introduced to how specific design characteristics promote harmony, unity, and/or peace.       Patient Session Goals / Objectives:    Identified how using mindful focused activities can be used for stress management, self care, health maintenance, and self regulation      Improved awareness of different types of activities that promote relaxation and stress management as a part of good self care, build resiliency, and prevent relapse and how this applies to current daily life    Established a plan for practice of these skills in their own environments    Practiced and reflected on how to generalize taught skills to their everyday life  Telemedicine Visit: The patient's condition can be safely assessed and treated via synchronous audio and visual telemedicine encounter.      Reason for Telemedicine Visit: Services only offered telehealth    Originating Site (Patient Location): Patient's home    Distant Site (Provider Location): Provider Remote Setting    Consent:  The patient/guardian has verbally consented to: the potential risks and benefits of telemedicine (video visit) versus in person care; bill my insurance or make self-payment for services provided;  and responsibility for payment of non-covered services.     Mode of Communication:  Video Conference via [email or web address]    As the provider I attest to compliance with applicable laws and regulations related to telemedicine.       Patient Participation / Response:  Fully participated with the group by sharing personal reflections / insights and openly received / provided feedback with other participants.    Verbalized understanding of content    Treatment Plan:  Patient has a current master individualized treatment plan.  See Epic treatment plan for more information.    Alicia He RN

## 2020-12-29 NOTE — GROUP NOTE
Psychoeducation Group Note    PATIENT'S NAME: Jammie Mariee  MRN:   [id number]  :   1965  ACCT. NUMBER: [id number]  DATE OF SERVICE: 20  START TIME:  9:00 AM  END TIME:  9:50 AM  FACILITATOR: Alicia He RN  TOPIC: DELMAR RN Group: [facility] 55+ Program  TRACK: a1    NUMBER OF PARTICIPANTS: 5    Summary of Group / Topics Discussed:  Foundations of Health: Pain: Types & treatments: Patients were educated on current pain theories, types of pain, and the treatments for pain including pharmacological and non-pharmacological (complementary) treatments and interventions. Patients participated in a group discussion about the impacts of pain on sleep, coping, socialization, nutrition, and activity. The relationship between pain and mental illness was explored.      Patient Session Goals / Objectives:  ? Verbalized understanding of pain theory   ? Identified different types of pain and how they are treated  ? Listed the risks and benefits to pharmacological interventions and nonpharmacological interventions  Telemedicine Visit: The patient's condition can be safely assessed and treated via synchronous audio and visual telemedicine encounter.      Reason for Telemedicine Visit: Services only offered telehealth    Originating Site (Patient Location): Patient's home    Distant Site (Provider Location): Provider Remote Setting    Consent:  The patient/guardian has verbally consented to: the potential risks and benefits of telemedicine (video visit) versus in person care; bill my insurance or make self-payment for services provided; and responsibility for payment of non-covered services.     Mode of Communication:  Video Conference via [employer].    As the provider I attest to compliance with applicable laws and regulations related to telemedicine.       Patient Participation / Response:  Fully participated with the group by sharing personal reflections / insights and openly  received / provided feedback with other participants.    Demonstrated understanding of topics discussed through group discussion and participation    Treatment Plan:  Patient has a current master individualized treatment plan.  See Epic treatment plan for more information.    Alicia He RN

## 2020-12-29 NOTE — GROUP NOTE
Telemedicine Visit: The patient's condition can be safely assessed and treated via synchronous audio and visual telemedicine encounter.      Reason for Telemedicine Visit: Services only offered telehealth    Originating Site (Patient Location): Patient's home    Distant Site (Provider Location): Provider Remote Setting    Consent:  The patient/guardian has verbally consented to: the potential risks and benefits of telemedicine (video visit) versus in person care; bill my insurance or make self-payment for services provided; and responsibility for payment of non-covered services.     Mode of Communication:  Video Conference via IROCKE    As the provider I attest to compliance with applicable laws and regulations related to telemedicine.  Process Group Note    PATIENT'S NAME: Jammie Mariee  MRN:   [id number]  :   1965  ACCT. NUMBER: [id number]  DATE OF SERVICE: 20  START TIME: 11:00 AM  END TIME: 11:50 AM  FACILITATOR: Ermelinda Flynn LMFT  TOPIC:  Process Group    Diagnoses:  296.53 Bipolar I Disorder Current or Most Recent Episode Depressed, Severe    4. Other Diagnoses that is relevant to services:   300.00 (F41.9) Unspecified Anxiety Disorder       [facility] 55+ Program  TRACK:A1    NUMBER OF PARTICIPANTS: 5          Data:    Session content: At the start of this group, patients were invited to check in by identifying themselves, describing their current emotional status, and identifying issues to address in this group.   Area(s) of treatment focus addressed in this session included Symptom Management.  Processed feeling lonely now that her family has left. Reports creating a list of things she needs to do and reports feeling overwhelmed with list. Explored ways to break list down into smalled goals and taking breaks. Identifies she is feeling stronger and eating more and would like to be able to use extra energy. No safety concerns.     Therapeutic Interventions/Treatment  "Strategies:  Psychotherapist offered support, feedback and validation and reinforced use of skills. Treatment modalities used include Cognitive Behavioral Therapy. Interventions include Behavioral Activation: Explored how behaviors effect mood and interact with thoughts and feelings and Coping Skills: Discussed how the use of intentional \"in the moment\" actions can help reduce distress.    Assessment:    Patient response:   Patient responded to session by accepting feedback, giving feedback, listening, focusing on goals, being attentive and verbalizing understanding    Possible barriers to participation / learning include: and no barriers identified    Health Issues:   None reported       Substance Use Review:   Substance Use: No active concerns identified.    Mental Status/Behavioral Observations  Appearance:   Appropriate   Eye Contact:   Good   Psychomotor Behavior: Normal   Attitude:   Cooperative   Orientation:   All  Speech   Rate / Production: Normal    Volume:  Normal   Mood:    Anxious   Affect:    Worrisome   Thought Content:   Clear  Thought Form:  Coherent  Logical     Insight:    Fair     Plan:     Safety Plan: No current safety concerns identified.  Recommended that patient call 911 or go to the local ED should there be a change in any of these risk factors.     Barriers to treatment: None identified    Patient Contracts (see media tab):  None    Substance Use: Not addressed in session     Continue or Discharge: Patient will continue in 55+ Program (55+) as planned. Patient is likely to benefit from learning and using skills as they work toward the goals identified in their treatment plan.      ARLENE Savage  December 29, 2020  "

## 2020-12-31 ENCOUNTER — HOSPITAL ENCOUNTER (OUTPATIENT)
Dept: BEHAVIORAL HEALTH | Facility: CLINIC | Age: 55
End: 2020-12-31
Attending: PSYCHIATRY & NEUROLOGY
Payer: COMMERCIAL

## 2020-12-31 PROCEDURE — G0177 OPPS/PHP; TRAIN & EDUC SERV: HCPCS | Mod: GT

## 2020-12-31 PROCEDURE — 90853 GROUP PSYCHOTHERAPY: CPT | Mod: GT

## 2020-12-31 NOTE — GROUP NOTE
Telemedicine Visit: The patient's condition can be safely assessed and treated via synchronous audio and visual telemedicine encounter.      Reason for Telemedicine Visit: Services only offered telehealth    Originating Site (Patient Location): Patient's home    Distant Site (Provider Location): Provider Remote Setting    Consent:  The patient/guardian has verbally consented to: the potential risks and benefits of telemedicine (video visit) versus in person care; bill my insurance or make self-payment for services provided; and responsibility for payment of non-covered services.     Mode of Communication:  Video Conference via [employer]    As the provider I attest to compliance with applicable laws and regulations related to telemedicine.  Process Group Note    PATIENT'S NAME: Jammie Mariee  MRN:   [id number]  :   1965  ACCT. NUMBER: [id number]  DATE OF SERVICE: 20  START TIME: 10:00 AM  END TIME: 10:55 AM  FACILITATOR: Ermelinda Flynn LMFT  TOPIC:  Process Group    Diagnoses:  296.53 Bipolar I Disorder Current or Most Recent Episode Depressed, Severe    4. Other Diagnoses that is relevant to services:   300.00 (F41.9) Unspecified Anxiety Disorder       [facility] 55+ Program  TRACK: A1    NUMBER OF PARTICIPANTS: 7        Data:    Session content: At the start of this group, patients were invited to check in by identifying themselves, describing their current emotional status, and identifying issues to address in this group.   Area(s) of treatment focus addressed in this session included Symptom Management.  Processed struggling with sadness from having her family leave and being home alone again. Reports she is feeling overwhelmed with anxiety and using skills to cope. Identifies noticing progress in her mental health and feeling she is able to eat better over the past week. No safety concerns.     Therapeutic Interventions/Treatment Strategies:  Psychotherapist offered support, feedback  "and validation and reinforced use of skills. Treatment modalities used include Cognitive Behavioral Therapy. Interventions include Coping Skills: Assisted patient in identifying 1-2 healthy distraction skills to reduce overall distress and Discussed how the use of intentional \"in the moment\" actions can help reduce distress.    Assessment:    Patient response:   Patient responded to session by accepting feedback, giving feedback, listening and verbalizing understanding    Possible barriers to participation / learning include: and no barriers identified    Health Issues:   None reported       Substance Use Review:   Substance Use: No active concerns identified.    Mental Status/Behavioral Observations  Appearance:   Appropriate   Eye Contact:   Good   Psychomotor Behavior: Normal   Attitude:   Cooperative   Orientation:   All  Speech   Rate / Production: Normal    Volume:  Normal   Mood:    Anxious   Affect:    Worrisome   Thought Content:   Clear  Thought Form:  Coherent  Logical     Insight:    Fair     Plan:     Safety Plan: No current safety concerns identified.  Recommended that patient call 911 or go to the local ED should there be a change in any of these risk factors.     Barriers to treatment: None identified    Patient Contracts (see media tab):  None    Substance Use: Not addressed in session     Continue or Discharge: Patient will continue in 55+ Program (55+) as planned. Patient is likely to benefit from learning and using skills as they work toward the goals identified in their treatment plan.      ARLENE Savage  December 31, 2020  "

## 2020-12-31 NOTE — GROUP NOTE
Psychoeducation Group Note    PATIENT'S NAME: Jammie Mariee  MRN:   [id number]  :   1965  ACCT. NUMBER: [id number]  DATE OF SERVICE: 20  START TIME: 11:00 AM  END TIME: 11:50 AM  FACILITATOR: Alicia He RN  TOPIC: DELMAR RN Group: [facility] 55+ Program  TRACK: a1    NUMBER OF PARTICIPANTS: 7    Summary of Group / Topics Discussed:  Foundations of Health: Nutrition: Macronutrients: Patient were provided education on major macronutrients, their role in the body, and why it is important to meet daily nutritional needs. Obstacles to meeting daily nutritional needs were identified in group discussion and strategies to promote improved nutrition were explored. Macronutrients discussed include: carbohydrates, proteins and amino acids, fats, fiber, and water.     Patient Session Goals / Objectives:  ? Discussed the role of diet on mood, physical health, energy level, and the development of chronic disease.  ? Identified daily nutritional needs recommended by the [employer] via My Plate education resources  ? Developing increased health literacy skills in finding credible nutrition information from reliable sources  Telemedicine Visit: The patient's condition can be safely assessed and treated via synchronous audio and visual telemedicine encounter.      Reason for Telemedicine Visit: Services only offered telehealth    Originating Site (Patient Location): Patient's home    Distant Site (Provider Location): Provider Remote Setting    Consent:  The patient/guardian has verbally consented to: the potential risks and benefits of telemedicine (video visit) versus in person care; bill my insurance or make self-payment for services provided; and responsibility for payment of non-covered services.     Mode of Communication:  Video Conference via [employer]    As the provider I attest to compliance with applicable laws and regulations related to telemedicine.       Patient Participation /  Response:  Fully participated with the group by sharing personal reflections / insights and openly received / provided feedback with other participants.    Demonstrated understanding of topics discussed through group discussion and participation    Treatment Plan:  Patient has a current master individualized treatment plan.  See Epic treatment plan for more information.    Alicia He RN

## 2021-01-04 ENCOUNTER — HOSPITAL ENCOUNTER (OUTPATIENT)
Dept: BEHAVIORAL HEALTH | Facility: CLINIC | Age: 56
End: 2021-01-04
Attending: PSYCHIATRY & NEUROLOGY
Payer: COMMERCIAL

## 2021-01-04 PROCEDURE — 90853 GROUP PSYCHOTHERAPY: CPT | Mod: 95

## 2021-01-04 PROCEDURE — G0177 OPPS/PHP; TRAIN & EDUC SERV: HCPCS | Mod: 95 | Performed by: OCCUPATIONAL THERAPIST

## 2021-01-04 NOTE — GROUP NOTE
Telemedicine Visit: The patient's condition can be safely assessed and treated via synchronous audio and visual telemedicine encounter.      Reason for Telemedicine Visit: Services only offered telehealth    Originating Site (Patient Location): Patient's home    Distant Site (Provider Location): Provider Remote Setting    Consent:  The patient/guardian has verbally consented to: the potential risks and benefits of telemedicine (video visit) versus in person care; bill my insurance or make self-payment for services provided; and responsibility for payment of non-covered services.     Mode of Communication:  Video Conference via [employer]    As the provider I attest to compliance with applicable laws and regulations related to telemedicine.  Process Group Note    PATIENT'S NAME: Jammie Mariee  MRN:   [id number]  :   1965  ACCT. NUMBER: [id number]  DATE OF SERVICE: 21  START TIME:  9:00 AM  END TIME:  9:50 AM  FACILITATOR: Ermelinda Flynn LMFT  TOPIC:  Process Group    Diagnoses:  296.53 Bipolar I Disorder Current or Most Recent Episode Depressed, Severe    4. Other Diagnoses that is relevant to services:   300.00 (F41.9) Unspecified Anxiety Disorder       [facility] 55+ Program  TRACK: A1    NUMBER OF PARTICIPANTS: 7          Data:    Session content: At the start of this group, patients were invited to check in by identifying themselves, describing their current emotional status, and identifying issues to address in this group.   Area(s) of treatment focus addressed in this session included Symptom Management.  Processed having up and downs with managing her anxiety. Reports she wants to set goals for herself and notices the busier she gets, her anxiety increases. Explored ways to ground and self soothe in the moment. Denies any safety concerns.     Therapeutic Interventions/Treatment Strategies:  Psychotherapist offered support, feedback and validation and reinforced use of skills. Treatment  "modalities used include Cognitive Behavioral Therapy. Interventions include Coping Skills: Assisted patient in identifying 1-2 healthy distraction skills to reduce overall distress and Discussed how the use of intentional \"in the moment\" actions can help reduce distress.    Assessment:    Patient response:   Patient responded to session by accepting feedback, giving feedback, listening, accepting support and verbalizing understanding    Possible barriers to participation / learning include: and no barriers identified    Health Issues:   None reported       Substance Use Review:   Substance Use: No active concerns identified.    Mental Status/Behavioral Observations  Appearance:   Appropriate   Eye Contact:   Good   Psychomotor Behavior: Normal   Attitude:   Cooperative   Orientation:   All  Speech   Rate / Production: Normal    Volume:  Normal   Mood:    Anxious   Affect:    Worrisome   Thought Content:   Clear  Thought Form:  Coherent  Logical     Insight:    Poor     Plan:     Safety Plan: No current safety concerns identified.  Recommended that patient call 911 or go to the local ED should there be a change in any of these risk factors.     Barriers to treatment: None identified    Patient Contracts (see media tab):  None    Substance Use: Not addressed in session     Continue or Discharge: Patient will continue in 55+ Program (55+) as planned. Patient is likely to benefit from learning and using skills as they work toward the goals identified in their treatment plan.      ARLENE Savage  January 4, 2021  "

## 2021-01-05 ENCOUNTER — HOSPITAL ENCOUNTER (OUTPATIENT)
Dept: BEHAVIORAL HEALTH | Facility: CLINIC | Age: 56
End: 2021-01-05
Attending: PSYCHIATRY & NEUROLOGY
Payer: COMMERCIAL

## 2021-01-05 PROCEDURE — G0177 OPPS/PHP; TRAIN & EDUC SERV: HCPCS | Mod: 95

## 2021-01-05 PROCEDURE — 90853 GROUP PSYCHOTHERAPY: CPT | Mod: 95

## 2021-01-05 PROCEDURE — G0177 OPPS/PHP; TRAIN & EDUC SERV: HCPCS | Mod: GT | Performed by: OCCUPATIONAL THERAPIST

## 2021-01-05 NOTE — GROUP NOTE
Telemedicine Visit: The patient's condition can be safely assessed and treated via synchronous audio and visual telemedicine encounter.      Reason for Telemedicine Visit: Services only offered telehealth    Originating Site (Patient Location): Patient's home    Distant Site (Provider Location): Provider Remote Setting    Consent:  The patient/guardian has verbally consented to: the potential risks and benefits of telemedicine (video visit) versus in person care; bill my insurance or make self-payment for services provided; and responsibility for payment of non-covered services.     Mode of Communication:  Video Conference via [employer]    As the provider I attest to compliance with applicable laws and regulations related to telemedicine.  Process Group Note    PATIENT'S NAME: Jammie Mariee  MRN:   [id number]  :   1965  ACCT. NUMBER: [id number]  DATE OF SERVICE: 21  START TIME: 10:00 AM  END TIME: 10:50 AM  FACILITATOR: Ermelinda Flynn LMFT  TOPIC:  Process Group    Diagnoses:  296.53 Bipolar I Disorder Current or Most Recent Episode Depressed, Severe    4. Other Diagnoses that is relevant to services:   300.00 (F41.9) Unspecified Anxiety Disorder       [facility] 55+ Program  TRACK: A1    NUMBER OF PARTICIPANTS: 7          Data:    Session content: At the start of this group, patients were invited to check in by identifying themselves, describing their current emotional status, and identifying issues to address in this group.   Area(s) of treatment focus addressed in this session included Symptom Management.  Processed feelings around having a panic attack the previous day. Reports she feels disappointed that she had one right before she had to bring her daughter home. Explored ways to recognize signs of panic and to regulate in the moment and take care of self after experience. No safety concerns.     Therapeutic Interventions/Treatment Strategies:  Psychotherapist offered support,  "feedback and validation and reinforced use of skills. Treatment modalities used include Cognitive Behavioral Therapy. Interventions include Coping Skills: Discussed use of self-soothe skills to decrease distress in the body and Discussed how the use of intentional \"in the moment\" actions can help reduce distress.    Assessment:    Patient response:   Patient responded to session by accepting feedback, giving feedback, listening and verbalizing understanding    Possible barriers to participation / learning include: and no barriers identified    Health Issues:   None reported       Substance Use Review:   Substance Use: No active concerns identified.    Mental Status/Behavioral Observations  Appearance:   Appropriate   Eye Contact:   Good   Psychomotor Behavior: Normal   Attitude:   Cooperative   Orientation:   All  Speech   Rate / Production: Normal    Volume:  Normal   Mood:    Anxious   Affect:    Worrisome   Thought Content:   Clear  Thought Form:  Coherent  Logical     Insight:    Fair     Plan:     Safety Plan: No current safety concerns identified.  Recommended that patient call 911 or go to the local ED should there be a change in any of these risk factors.     Barriers to treatment: None identified    Patient Contracts (see media tab):  None    Substance Use: Not addressed in session     Continue or Discharge: Patient will continue in 55+ Program (55+) as planned. Patient is likely to benefit from learning and using skills as they work toward the goals identified in their treatment plan.      ARLENE Savage  January 5, 2021  "

## 2021-01-05 NOTE — GROUP NOTE
Psychoeducation Group Note    PATIENT'S NAME: Jammie Mariee  MRN:   [id number]  :   1965  ACCT. NUMBER: [id number]  DATE OF SERVICE: 21  START TIME:  9:00 AM  END TIME:  9:50 AM  FACILITATOR: Alicia He RN  TOPIC: MH RN Group: Mental Health Maintenance  [facility] 55+ Program  TRACK: a1    NUMBER OF PARTICIPANTS: 7    Summary of Group / Topics Discussed:  Mental Health Maintenance: Hope    The topic of hope was explored and discussed.  Patients shared what hope means to them.  We also discussed the various causes for losing hope.  Ways to return to a hopeful mindset were also discussed.  Group Session Goals / Objectives:  ? Defined and described definition of hope  ? Identified 2 or more ways of returning to a hopeful mindset  Telemedicine Visit: The patient's condition can be safely assessed and treated via synchronous audio and visual telemedicine encounter.      Reason for Telemedicine Visit: Services only offered telehealth    Originating Site (Patient Location): Patient's home    Distant Site (Provider Location): Provider Remote Setting    Consent:  The patient/guardian has verbally consented to: the potential risks and benefits of telemedicine (video visit) versus in person care; bill my insurance or make self-payment for services provided; and responsibility for payment of non-covered services.     Mode of Communication:  Video Conference via [employer]    As the provider I attest to compliance with applicable laws and regulations related to telemedicine.       Patient Participation / Response:  Fully participated with the group by sharing personal reflections / insights and openly received / provided feedback with other participants.    Demonstrated understanding of topics discussed through group discussion and participation    Treatment Plan:  Patient has a current master individualized treatment plan.  See Epic treatment plan for more information.    Alicia He RN

## 2021-01-05 NOTE — GROUP NOTE
Psychoeducation Group Note    PATIENT'S NAME: Jammie Mariee  MRN:   [id number]  :   1965  ACCT. NUMBER: [id number]  DATE OF SERVICE: 21  START TIME: 10:00 AM  END TIME: 10:50 AM  FACILITATOR: Roseanne Valdez OTR/L  TOPIC: MH Life Skills Group: Resiliency Development  [facility] 55+ Program  TRACK: A1    NUMBER OF PARTICIPANTS: 6    Telemedicine Visit: The patient's condition can be safely assessed and treated via synchronous audio and visual telemedicine encounter.      Reason for Telemedicine Visit: Services only offered telehealth    Originating Site (Patient Location): Patient's home    Distant Site (Provider Location): [facility] [place] [address]    Consent:  The patient/guardian has verbally consented to: the potential risks and benefits of telemedicine (video visit) versus in person care; bill my insurance or make self-payment for services provided; and responsibility for payment of non-covered services.     Mode of Communication:  Video Conference via [employer]    As the provider I attest to compliance with applicable laws and regulations related to telemedicine.     Summary of Group / Topics Discussed:  Resiliency Development:  Coping Skills: Aftercare Coping Cards: Patients were taught how to begin to develop a relapse management kit for both mental and substance use/abuse by creating individualized coping cards.    Patient Session Goals / Objectives:    Identified individualized coping skills they can use for effectively managing both mental health and substance abuse/abuse symptoms     Improved awareness of different types of coping skills and how to personalize them to their unique situation and circumstances      Established a plan for practice of these skills in their own environments    Practiced and reflected on how to generalize taught skills to their everyday life        Patient Participation / Response:  Moderately participated, sharing some  personal reflections / insights and adequately adequately received / provided feedback with other participants.    Patient presentation: constricted affect; anxious mood observed; shared some ideas with the group, Verbalized understanding of content and Patient would benefit from additional opportunities to practice the content to be able to generalize it to their everyday life with increased intentionality, consistency, and efficacy in support of their psychiatric recovery    Treatment Plan:  Patient has a current master individualized treatment plan.  See Epic treatment plan for more information.    Roseanne Valdez, OTR/L

## 2021-01-06 NOTE — GROUP NOTE
Psychoeducation Group Note    PATIENT'S NAME: Jammie Mariee  MRN:   [id number]  :   1965  ACCT. NUMBER: [id number]  DATE OF SERVICE: 21  START TIME: 11:00 AM  END TIME: 11:50 AM  FACILITATOR: Roseanne Valdez OTR/L  TOPIC: MH Life Skills Group: Communication and Social Skills Development  [facility] 55+ [facility]  TRACK: A1    NUMBER OF PARTICIPANTS: 7    Telemedicine Visit: The patient's condition can be safely assessed and treated via synchronous audio and visual telemedicine encounter.      Reason for Telemedicine Visit: Services only offered telehealth    Originating Site (Patient Location): Patient's home    Distant Site (Provider Location): [facility] [place] [address]    Consent:  The patient/guardian has verbally consented to: the potential risks and benefits of telemedicine (video visit) versus in person care; bill my insurance or make self-payment for services provided; and responsibility for payment of non-covered services.     Mode of Communication:  Video Conference via [employer]     As the provider I attest to compliance with applicable laws and regulations related to telemedicine.     Summary of Group / Topics Discussed:  Communication and Social Skills Development: Social Supports: Making Connections: Patients were taught about the importance of connecting with other people especially in this treatment setting.  Patients gained awareness of their personal strengths and opportunities for growth in communicating with others.  Patients were given an opportunity to practice ways to improve their ability to connect with others and work on building trust within the treatment group.     Patient Session Goals / Objectives:    Identified strengths and opportunities for growth in connecting with other people and how this impacts their ability to communicate clearly with other people       Improved awareness of important aspects of the need for connection with other  people and how this relates to mental health recovery        Established a plan for practice of these skills in their own environments    Practiced and reflected on how to generalize taught skills to their everyday life      Patient Participation / Response:  Fully participated with the group by sharing personal reflections / insights and openly received / provided feedback with other participants.    Patient presentation: constricted affect; appeared to have low energy; active in group discussion sharing insights and ideas with peers, Verbalized understanding of content and Patient would benefit from additional opportunities to practice the content to be able to generalize it to their everyday life with increased intentionality, consistency, and efficacy in support of their psychiatric recovery    Treatment Plan:  Patient has a current master individualized treatment plan.  See Epic treatment plan for more information.    Roseanne Valdez, OTR/L

## 2021-01-07 ENCOUNTER — HOSPITAL ENCOUNTER (OUTPATIENT)
Dept: BEHAVIORAL HEALTH | Facility: CLINIC | Age: 56
End: 2021-01-07
Attending: PSYCHIATRY & NEUROLOGY
Payer: COMMERCIAL

## 2021-01-07 PROCEDURE — G0177 OPPS/PHP; TRAIN & EDUC SERV: HCPCS | Mod: 95 | Performed by: OCCUPATIONAL THERAPIST

## 2021-01-07 PROCEDURE — G0177 OPPS/PHP; TRAIN & EDUC SERV: HCPCS | Mod: GT

## 2021-01-07 PROCEDURE — 90853 GROUP PSYCHOTHERAPY: CPT | Mod: GT

## 2021-01-07 NOTE — GROUP NOTE
Psychoeducation Group Note    PATIENT'S NAME: Jammie Mariee  MRN:   [id number]  :   1965  ACCT. NUMBER: [id number]  DATE OF SERVICE: 21  START TIME: 11:00 AM  END TIME: 11:50 AM  FACILITATOR: Alicia He RN  TOPIC: DELMAR RN Group: Mental Health Maintenance  [facility] 55+ Program  TRACK: a1    NUMBER OF PARTICIPANTS: 8    Summary of Group / Topics Discussed:  Mental Health Maintenance:  Assessments of Strengths: Patients completed a self-reflection on personal strengths worksheet. The concept of personal strength as it relates to resilience were explored. Patients shared responses with the group and participated in discussion.     Patient Session Goals / Objectives:  ? Patients identified 1-3 qualities they consider a personal strength.  ? Patients understood the concept of personal strengths and the connection it has to resiliency  Telemedicine Visit: The patient's condition can be safely assessed and treated via synchronous audio and visual telemedicine encounter.      Reason for Telemedicine Visit: Services only offered telehealth    Originating Site (Patient Location): Patient's home    Distant Site (Provider Location): Provider Remote Setting    Consent:  The patient/guardian has verbally consented to: the potential risks and benefits of telemedicine (video visit) versus in person care; bill my insurance or make self-payment for services provided; and responsibility for payment of non-covered services.     Mode of Communication:  Video Conference via [employer]    As the provider I attest to compliance with applicable laws and regulations related to telemedicine.       Patient Participation / Response:  Fully participated with the group by sharing personal reflections / insights and openly received / provided feedback with other participants.    Demonstrated understanding of topics discussed through group discussion and participation    Treatment Plan:  Patient has an initial  individualized treatment plan that was created as part of their diagnostic assessment / admission process.  A master individualized treatment plan is in the process of being developed with the patient and multi-disciplinary care team.    Alicia He RN

## 2021-01-07 NOTE — GROUP NOTE
Psychoeducation Group Note    PATIENT'S NAME: Jammie Mariee  MRN:   [id number]  :   1965  ACCT. NUMBER: [id number]  DATE OF SERVICE: 21  START TIME: 10:00 AM  END TIME: 10:50 AM  FACILITATOR: Roseanne Valdez OTR/L  TOPIC: MH Life Skills Group: Lifestyle Balance and Structure  [facility] 55+ Program  TRACK: A1    NUMBER OF PARTICIPANTS: 8    Telemedicine Visit: The patient's condition can be safely assessed and treated via synchronous audio and visual telemedicine encounter.      Reason for Telemedicine Visit: Services only offered telehealth    Originating Site (Patient Location): Patient's home    Distant Site (Provider Location): [facility] [place] [address]    Consent:  The patient/guardian has verbally consented to: the potential risks and benefits of telemedicine (video visit) versus in person care; bill my insurance or make self-payment for services provided; and responsibility for payment of non-covered services.     Mode of Communication:  Video Conference via [employer]    As the provider I attest to compliance with applicable laws and regulations related to telemedicine.        Summary of Group / Topics Discussed:  Lifestyle Balance and Strucure:  Occupations: Patients were provided with an overview on the importance of daily occupations in support of mental health management.  Patients identified a task they either needed to, wanted to, or had to work on and spent time in group working on these tasks during the session.  Patients then reported on progress made or barriers encountered and feelings they experienced during the session with the group. Patients were provided with education on how improved engagement and promotion of positive mental health can be found through meaningful occupations.  Patients gained awareness of the connection between who they are (self identity) with what they do.    Patient Session Goals / Objectives:    Increased awareness of  how patient s functioning in identified meaningful occupations are impacted by their mental health status     Developed performance skills and performance patterns to enhance occupational engagement    Identified a task that they needed to/wanted to/had to complete and reported on progress made and/or barriers encountered and emotions experienced during the session    Explored ways to generalize new awareness and skills to their everyday life        Patient Participation / Response:  Fully participated with the group by sharing personal reflections / insights and openly received / provided feedback with other participants.    Patient presentation: constricted affect; anxious mood observed; active in group, Demonstrated understanding of content through identifying a couple of household chores she wanted to complete and reported progress on each of these; reported feeling good about her progress  and Patient would benefit from additional opportunities to practice the content to be able to generalize it to their everyday life with increased intentionality, consistency, and efficacy in support of their psychiatric recovery    Treatment Plan:  Patient has a current master individualized treatment plan.  See Epic treatment plan for more information.  Jammie does not feel ready for discharge from the program yet so will extend her treatment at this time.     Roseanne Valdez, OTR/L

## 2021-01-07 NOTE — GROUP NOTE
Telemedicine Visit: The patient's condition can be safely assessed and treated via synchronous audio and visual telemedicine encounter.      Reason for Telemedicine Visit: Services only offered telehealth    Originating Site (Patient Location): Patient's home    Distant Site (Provider Location): Provider Remote Setting    Consent:  The patient/guardian has verbally consented to: the potential risks and benefits of telemedicine (video visit) versus in person care; bill my insurance or make self-payment for services provided; and responsibility for payment of non-covered services.     Mode of Communication:  Video Conference via [employer]    As the provider I attest to compliance with applicable laws and regulations related to telemedicine.  Process Group Note    PATIENT'S NAME: Jammie Mariee  MRN:   [id number]  :   1965  ACCT. NUMBER: [id number]  DATE OF SERVICE: 21  START TIME:  9:00 AM  END TIME:  9:50 AM  FACILITATOR: Ermelinda Flynn LMFT  TOPIC:  Process Group    Diagnoses:  296.53 Bipolar I Disorder Current or Most Recent Episode Depressed, Severe    4. Other Diagnoses that is relevant to services:   300.00 (F41.9) Unspecified Anxiety Disorder       [facility] 55+ Program  TRACK: A1    NUMBER OF PARTICIPANTS: 7          Data:    Session content: At the start of this group, patients were invited to check in by identifying themselves, describing their current emotional status, and identifying issues to address in this group.   Area(s) of treatment focus addressed in this session included Symptom Management.  Processed continued struggling with managing anxiety. Reports she spent the previous day supporting her daughter to drive around to get errands done. Identifies not feeling anxious while driving just when she tries to leave her house. Explored ways to slow down to focus on breathe and the impact of her COPD    Therapeutic Interventions/Treatment Strategies:  Psychotherapist offered  "support, feedback and validation and reinforced use of skills. Treatment modalities used include Cognitive Behavioral Therapy. Interventions include Coping Skills: Discussed how the use of intentional \"in the moment\" actions can help reduce distress.    Assessment:    Patient response:   Patient responded to session by accepting feedback, giving feedback, listening and verbalizing understanding    Possible barriers to participation / learning include: and no barriers identified    Health Issues:   None reported       Substance Use Review:   Substance Use: No active concerns identified.    Mental Status/Behavioral Observations  Appearance:   Appropriate   Eye Contact:   Good   Psychomotor Behavior: Normal   Attitude:   Cooperative   Orientation:   All  Speech   Rate / Production: Normal    Volume:  Normal   Mood:    Anxious   Affect:    Worrisome   Thought Content:   Clear  Thought Form:  Coherent  Logical     Insight:    Poor     Plan:     Safety Plan: No current safety concerns identified.  Recommended that patient call 911 or go to the local ED should there be a change in any of these risk factors.     Barriers to treatment: None identified    Patient Contracts (see media tab):  None    Substance Use: Not addressed in session     Continue or Discharge: Patient will continue in 55+ Program (55+) as planned. Patient is likely to benefit from learning and using skills as they work toward the goals identified in their treatment plan.      ARLENE Savage  January 7, 2021  "

## 2021-01-11 ENCOUNTER — HOSPITAL ENCOUNTER (OUTPATIENT)
Dept: BEHAVIORAL HEALTH | Facility: CLINIC | Age: 56
End: 2021-01-11
Attending: PSYCHIATRY & NEUROLOGY
Payer: COMMERCIAL

## 2021-01-11 PROCEDURE — 90853 GROUP PSYCHOTHERAPY: CPT | Mod: 95

## 2021-01-11 PROCEDURE — G0177 OPPS/PHP; TRAIN & EDUC SERV: HCPCS | Mod: GT | Performed by: OCCUPATIONAL THERAPIST

## 2021-01-11 NOTE — GROUP NOTE
Telemedicine Visit: The patient's condition can be safely assessed and treated via synchronous audio and visual telemedicine encounter.      Reason for Telemedicine Visit: Services only offered telehealth    Originating Site (Patient Location): Patient's home    Distant Site (Provider Location): Provider Remote Setting    Consent:  The patient/guardian has verbally consented to: the potential risks and benefits of telemedicine (video visit) versus in person care; bill my insurance or make self-payment for services provided; and responsibility for payment of non-covered services.     Mode of Communication:  Video Conference via [email or web address]    As the provider I attest to compliance with applicable laws and regulations related to telemedicine.    Psychotherapy Group Note    PATIENT'S NAME: Jammie Mariee  MRN:   [id number]  :   1965  ACCT. NUMBER: [id number]  DATE OF SERVICE: 21  START TIME: 11:00 AM  END TIME: 11:50 AM  FACILITATOR: Ermelinda Flynn LMFT  TOPIC:  EBP Group: Specialty Awareness  [place] [address]+ Program  TRACK: A1    NUMBER OF PARTICIPANTS: 7    Summary of Group / Topics Discussed:  Specialty Topics: Forgiveness: Patients were provided with an overview of the topic of forgiveness including how to better understand the nature of forgiveness of others and oneself. Exploring the phases of forgiveness and how one works them was covered. Patients were able to explore how practicing forgiveness may positively impact their functioning.     Patient Session Goals / Objectives:    Discussed definitions of forgiveness and self-forgiveness    Explored the phases and process of forgiveness    Discussed benefits of forgiveness to their relationships with themselves and others, connecting the concept to mindfulness and acceptance    Assisted patients in recognizing when practicing forgiveness may be helpful      Patient Participation / Response:  Moderately participated, sharing some personal  reflections / insights and adequately adequately received / provided feedback with other participants.    Demonstrated understanding of topics discussed through group discussion and participation, Verbalized understanding of ways to proactively manage illness and Practiced skills in session    Treatment Plan:  Patient has a current master individualized treatment plan.  See Epic treatment plan for more information.    ARLENE Savage

## 2021-01-11 NOTE — GROUP NOTE
Telemedicine Visit: The patient's condition can be safely assessed and treated via synchronous audio and visual telemedicine encounter.      Reason for Telemedicine Visit: Services only offered telehealth    Originating Site (Patient Location): Patient's home    Distant Site (Provider Location): Provider Remote Setting    Consent:  The patient/guardian has verbally consented to: the potential risks and benefits of telemedicine (video visit) versus in person care; bill my insurance or make self-payment for services provided; and responsibility for payment of non-covered services.     Mode of Communication:  Video Conference via "[employer]    As the provider I attest to compliance with applicable laws and regulations related to telemedicine.  Process Group Note    PATIENT'S NAME: Jammie Mariee  MRN:   [id number]  :   1965  ACCT. NUMBER: [id number]  DATE OF SERVICE: 21  START TIME:  9:00 AM  END TIME:  9:50 AM  FACILITATOR: Ermelinda Flynn LMFT  TOPIC:  Process Group    Diagnoses:  296.53 Bipolar I Disorder Current or Most Recent Episode Depressed, Severe    4. Other Diagnoses that is relevant to services:   300.00 (F41.9) Unspecified Anxiety Disorder       [facility] 55+ Program  TRACK:A1   NUMBER OF PARTICIPANTS: 7          Data:    Session content: At the start of this group, patients were invited to check in by identifying themselves, describing their current emotional status, and identifying issues to address in this group.   Area(s) of treatment focus addressed in this session included Symptom Management.  Processed having a hard weekend struggling with anxiety and depression. Reports she spent the previous day in bed and feeling guilty about inactivity. Explored ways to practice self compassion and to cope with anxiety in the moment. No safety concerns     Therapeutic Interventions/Treatment Strategies:  Psychotherapist offered support, feedback and validation and reinforced use of  "skills. Treatment modalities used include Cognitive Behavioral Therapy. Interventions include Cognitive Restructuring:  Assisted patient in formulating new neutral/positive alternatives to challenge less helpful / ineffective thoughts and Coping Skills: Discussed how the use of intentional \"in the moment\" actions can help reduce distress.    Assessment:    Patient response:   Patient responded to session by accepting feedback, giving feedback, listening, focusing on goals and verbalizing understanding    Possible barriers to participation / learning include: and no barriers identified    Health Issues:   None reported       Substance Use Review:   Substance Use: No active concerns identified.    Mental Status/Behavioral Observations  Appearance:   Appropriate   Eye Contact:   Good   Psychomotor Behavior: Normal   Attitude:   Cooperative   Orientation:   All  Speech   Rate / Production: Normal    Volume:  Normal   Mood:    Anxious  Depressed   Affect:    Subdued  Worrisome   Thought Content:   Clear  Thought Form:  Coherent  Logical     Insight:    Poor     Plan:     Safety Plan: No current safety concerns identified.  Recommended that patient call 911 or go to the local ED should there be a change in any of these risk factors.     Barriers to treatment: None identified    Patient Contracts (see media tab):  None    Substance Use: Not addressed in session     Continue or Discharge: Patient will continue in 55+ Program (55+) as planned. Patient is likely to benefit from learning and using skills as they work toward the goals identified in their treatment plan.      ARLENE Savage  January 11, 2021  "

## 2021-01-11 NOTE — GROUP NOTE
Psychoeducation Group Note    PATIENT'S NAME: Jammie Mariee  MRN:   [id number]  :   1965  ACCT. NUMBER: [id number]  DATE OF SERVICE: 21  START TIME: 10:00 AM  END TIME: 10:50 AM  FACILITATOR: Roseanne Valdez OTR/L  TOPIC: MH Life Skills Group: Resiliency Development  [facility] 55+ Program  TRACK: A1    NUMBER OF PARTICIPANTS: 8    Telemedicine Visit: The patient's condition can be safely assessed and treated via synchronous audio and visual telemedicine encounter.      Reason for Telemedicine Visit: Services only offered telehealth    Originating Site (Patient Location): Patient's home    Distant Site (Provider Location): [facility]    Consent:  The patient/guardian has verbally consented to: the potential risks and benefits of telemedicine (video visit) versus in person care; bill my insurance or make self-payment for services provided; and responsibility for payment of non-covered services.     Mode of Communication:  Video Conference via Zoom    As the provider I attest to compliance with applicable laws and regulations related to telemedicine.     Summary of Group / Topics Discussed:  Resiliency Development:  Coping Skills: Neuroplasticity Patients participated in a psychoeducational discussion and written activity focused on neuroplasticity, negativity bias in our brain, and the need for increased awareness of positive events, experiences, and thinking as a way to challenge symptoms and stressors and build healthy coping skills.  Discussion also focused on the impact traumatic experiences and events impact the brain and shape our experiences.      Patient Session Goals / Objectives:    Identified how neuroplasticity, negativity bias, and increasing awareness of poistive events and experience can be used to better manage symptom and stress management       Improved awareness of individualized symptoms and stressors and how to effectively  cope     Established a relapse prevention plan to practice these skills in their own environments    Practiced and reflected on how to generalize taught skills to their everyday life          Patient Participation / Response:  Minimally participated, only when prompted / asked.    Patient presentation: quiet in group today; constricted affect and Patient would benefit from additional opportunities to practice the content to be able to generalize it to their everyday life with increased intentionality, consistency, and efficacy in support of their psychiatric recovery    Treatment Plan:  Patient has a current master individualized treatment plan.  See Epic treatment plan for more information.    Roseanne Valdez, OTR/L

## 2021-01-11 NOTE — ADDENDUM NOTE
Encounter addended by: Ermelinda Flynn LMFT on: 1/11/2021 2:56 PM   Actions taken: Charge Capture section accepted

## 2021-01-12 ENCOUNTER — HOSPITAL ENCOUNTER (OUTPATIENT)
Dept: BEHAVIORAL HEALTH | Facility: CLINIC | Age: 56
End: 2021-01-12
Attending: PSYCHIATRY & NEUROLOGY
Payer: COMMERCIAL

## 2021-01-12 PROCEDURE — G0177 OPPS/PHP; TRAIN & EDUC SERV: HCPCS | Mod: GT | Performed by: OCCUPATIONAL THERAPIST

## 2021-01-12 PROCEDURE — 90853 GROUP PSYCHOTHERAPY: CPT | Mod: 95

## 2021-01-12 PROCEDURE — G0177 OPPS/PHP; TRAIN & EDUC SERV: HCPCS | Mod: 95

## 2021-01-12 NOTE — GROUP NOTE
Psychoeducation Group Note    PATIENT'S NAME: Jammie Mariee  MRN:   [id number]  :   1965  ACCT. NUMBER: [id number]  DATE OF SERVICE: 21  START TIME:  9:00 AM  END TIME:  9:50 AM  FACILITATOR: Alicia He RN  TOPIC: DELMAR RN Group: Health Maintenance  [facility] 55+ Program  TRACK: a1    NUMBER OF PARTICIPANTS: 6    Summary of Group / Topics Discussed:  Health Maintenance: Eight Dimensions of Wellness: The concept of holistic health through the model of eight dimensions was introduced. Group members participated in identifying behaviors and activities in each of the dimensions of wellness.  The importance of each dimension was reinforced and the concept of balance in life as it relates to wellness was explored.      Patient Session Goals / Objectives:    Verbalized understanding of balance in wellness and how it relates to their life    Identified and explained the eight dimensions of wellness    Categorized activities and wellness needs into corresponding dimensions appropriately during exercise    Telemedicine Visit: The patient's condition can be safely assessed and treated via synchronous audio and visual telemedicine encounter.      Reason for Telemedicine Visit: Services only offered telehealth    Originating Site (Patient Location): Patient's home    Distant Site (Provider Location): Provider Remote Setting    Consent:  The patient/guardian has verbally consented to: the potential risks and benefits of telemedicine (video visit) versus in person care; bill my insurance or make self-payment for services provided; and responsibility for payment of non-covered services.     Mode of Communication:  Video Conference via [employer]    As the provider I attest to compliance with applicable laws and regulations related to telemedicine.       Patient Participation / Response:  Fully participated with the group by sharing personal reflections / insights and openly received / provided feedback  with other participants.    Demonstrated understanding of topics discussed through group discussion and participation    Treatment Plan:  Patient has a current master individualized treatment plan.  See Epic treatment plan for more information.    Alicia He RN

## 2021-01-12 NOTE — GROUP NOTE
"Psychoeducation Group Note    PATIENT'S NAME: Jammie Mariee  MRN:   [id number]  :   1965  ACCT. NUMBER: [id number]  DATE OF SERVICE: 21  START TIME: 11:00 AM  END TIME: 11:50 AM  FACILITATOR: Roseanne Valdez OTR/L  TOPIC: MH Life Skills Group: Resiliency Development  [facility] 55+ Program  TRACK: A1    NUMBER OF PARTICIPANTS: 6    Telemedicine Visit: The patient's condition can be safely assessed and treated via synchronous audio and visual telemedicine encounter.      Reason for Telemedicine Visit: Services only offered telehealth    Originating Site (Patient Location): Patient's home    Distant Site (Provider Location): [facility] [place] [address]    Consent:  The patient/guardian has verbally consented to: the potential risks and benefits of telemedicine (video visit) versus in person care; bill my insurance or make self-payment for services provided; and responsibility for payment of non-covered services.     Mode of Communication:  Video Conference via Zoom    As the provider I attest to compliance with applicable laws and regulations related to telemedicine.     Summary of Group / Topics Discussed:  Resiliency Development:  Self Esteem and Self Compassion: Occupational Gifts: Patients were given the opportunity to reflect and identified different types of occupations (activities) they participate in to maintain and/or build resilience in their lives.  Patients were taught about how \"doing\" promotes mental health recovery by providing routine and structure, empowerment, sense of self, and connectedness.  Patients identified occupations (activities) that promote mental health: connection, centering, creation, contemplation, and contribution.  Patients were also given the opportunity to improve self efficacy, self sufficiency, quality of life and a sense of mastery and competency by identifying positive aspects of their life and to instill hope.      "   Patient Session Goals / Objectives:    Identified occupations (activities) they can use to promote mental health recovery and as a way to effectively manage both mental health and substance abuse/abuse symptoms     Improved awareness of positive qualities of occupations they currently participate in and new occupations they might try and how this contribute to the process of recovery and mental health wellbeing and resiliency      Established a plan for practice of these skills in their own environments    Practiced and reflected on how to generalize taught skills to their everyday life       Patient Participation / Response:  Fully participated with the group by sharing personal reflections / insights and openly received / provided feedback with other participants.    Patient presentation: constricted affect; active in offering examples of activities that help her, Verbalized understanding of content and Patient would benefit from additional opportunities to practice the content to be able to generalize it to their everyday life with increased intentionality, consistency, and efficacy in support of their psychiatric recovery    Treatment Plan:  Patient has a current master individualized treatment plan.  See Epic treatment plan for more information.    Roseanne Valdez, OTR/L

## 2021-01-12 NOTE — GROUP NOTE
Telemedicine Visit: The patient's condition can be safely assessed and treated via synchronous audio and visual telemedicine encounter.      Reason for Telemedicine Visit: Services only offered telehealth    Originating Site (Patient Location): Patient's home    Distant Site (Provider Location): Provider Remote Setting    Consent:  The patient/guardian has verbally consented to: the potential risks and benefits of telemedicine (video visit) versus in person care; bill my insurance or make self-payment for services provided; and responsibility for payment of non-covered services.     Mode of Communication:  Video Conference via [employer]    As the provider I attest to compliance with applicable laws and regulations related to telemedicine.  Process Group Note    PATIENT'S NAME: Jammie Mariee  MRN:   [id number]  :   1965  ACCT. NUMBER: [id number]  DATE OF SERVICE: 21  START TIME: 10:00 AM  END TIME: 10:50 AM  FACILITATOR: Ermelinda Flynn LMFT  TOPIC:  Process Group    Diagnoses:  296.53 Bipolar I Disorder Current or Most Recent Episode Depressed, Severe    4. Other Diagnoses that is relevant to services:   300.00 (F41.9) Unspecified Anxiety Disorder       [facility] 55+ Program  TRACK: A1    NUMBER OF PARTICIPANTS: 6          Data:    Session content: At the start of this group, patients were invited to check in by identifying themselves, describing their current emotional status, and identifying issues to address in this group.   Area(s) of treatment focus addressed in this session included Symptom Management.  Processed feeling an improvement in anxiety symptoms. Reports she was able to do things around the house, take breaks and drive her daughter home without having any panic attacks. Identifies focusing on maintaining her breath and telling herself she's ok. 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     Therapeutic  "Interventions/Treatment Strategies:  Psychotherapist offered support, feedback and validation and reinforced use of skills. Treatment modalities used include Cognitive Behavioral Therapy. Interventions include Coping Skills: Discussed how the use of intentional \"in the moment\" actions can help reduce distress and Assisted patient in understanding the purpose of planning / creating / participating / sharing in positive experiences.    Assessment:    Patient response:   Patient responded to session by accepting feedback, giving feedback, listening and verbalizing understanding    Possible barriers to participation / learning include: and no barriers identified    Health Issues:   None reported       Substance Use Review:   Substance Use: No active concerns identified.    Mental Status/Behavioral Observations  Appearance:   Disheveled   Eye Contact:   Good   Psychomotor Behavior: Normal   Attitude:   Cooperative   Orientation:   All  Speech   Rate / Production: Normal    Volume:  Normal   Mood:    Anxious   Affect:    Worrisome   Thought Content:   Clear  Thought Form:  Coherent  Logical     Insight:    Fair     Plan:     Safety Plan: No current safety concerns identified.  Recommended that patient call 911 or go to the local ED should there be a change in any of these risk factors.     Barriers to treatment: None identified    Patient Contracts (see media tab):  None    Substance Use: Not addressed in session     Continue or Discharge: Patient will continue in 55+ Program (55+) as planned. Patient is likely to benefit from learning and using skills as they work toward the goals identified in their treatment plan.      ARLENE Savage  January 12, 2021  "

## 2021-01-14 ENCOUNTER — HOSPITAL ENCOUNTER (OUTPATIENT)
Dept: BEHAVIORAL HEALTH | Facility: CLINIC | Age: 56
End: 2021-01-14
Attending: PSYCHIATRY & NEUROLOGY
Payer: COMMERCIAL

## 2021-01-14 PROCEDURE — G0177 OPPS/PHP; TRAIN & EDUC SERV: HCPCS | Mod: 95

## 2021-01-14 PROCEDURE — 90853 GROUP PSYCHOTHERAPY: CPT | Mod: GT

## 2021-01-14 PROCEDURE — G0177 OPPS/PHP; TRAIN & EDUC SERV: HCPCS | Mod: 95 | Performed by: OCCUPATIONAL THERAPIST

## 2021-01-14 NOTE — GROUP NOTE
Psychoeducation Group Note    PATIENT'S NAME: Jammie Mariee  MRN:   [id number]  :   1965  ACCT. NUMBER: [id number]  DATE OF SERVICE: 21  START TIME: 10:00 AM  END TIME: 10:50 AM  FACILITATOR: Roseanne Valdez OTR/L  TOPIC: MH Life Skills Group: Lifestyle Balance and Structure  [facility] 55+ Program  TRACK: A1    NUMBER OF PARTICIPANTS: 6    Telemedicine Visit: The patient's condition can be safely assessed and treated via synchronous audio and visual telemedicine encounter.      Reason for Telemedicine Visit: Services only offered telehealth    Originating Site (Patient Location): Patient's home    Distant Site (Provider Location): [facility] [place] [address]    Consent:  The patient/guardian has verbally consented to: the potential risks and benefits of telemedicine (video visit) versus in person care; bill my insurance or make self-payment for services provided; and responsibility for payment of non-covered services.     Mode of Communication:  Video Conference via Zoom    As the provider I attest to compliance with applicable laws and regulations related to telemedicine.     Summary of Group / Topics Discussed:  Lifestyle Balance and Strucure:  Goal-setting & integration Practice: Patients identified an activity/task/occupation that they needed to/wanted to/had to complete and worked on this during the session in a supportive treatment environment.  Patients then processed their experience, progress made, and barriers encountered with the group.  Also discussed feelings encountered as a result of the experience.  Discussed performance skills used that helped them to follow through with meaningful personal, health, recovery and treatment activities aimed at improving overall functioning.  Patients were also taught and then practiced techniques to manage a variety of obstacles to achieve their goals and how to break goals into manageable pieces.      Patient  Session Goals / Objectives:    Identified how meaningful engagement in activities/roles/occupations/tasks helps support recovery and wellbeing    Identified and worked on a meaningful task/activity during the session and reflected on progress made and barriers encountered and how this relates to their recovery    Identified and problem solved barriers to achieving goals    Identified ways to support task engagement outside of treatment session to further their recovery           Patient Participation / Response:  Fully participated with the group by sharing personal reflections / insights and openly received / provided feedback with other participants.    Patient presentation: constricted affect; reported anxious mood today, Demonstrated understanding of content through actively engaging in activity; reported increased anxiety during the activity but was able to take small breaks and then resume her work; found this helpful to practice  and Patient would benefit from additional opportunities to practice the content to be able to generalize it to their everyday life with increased intentionality, consistency, and efficacy in support of their psychiatric recovery    Treatment Plan:  Patient has a current master individualized treatment plan.  See Epic treatment plan for more information.    Roseanne Valdez, OTR/L

## 2021-01-14 NOTE — GROUP NOTE
Psychoeducation Group Note    PATIENT'S NAME: Jammie Mariee  MRN:   [id number]  :   1965  ACCT. NUMBER: [id number]  DATE OF SERVICE: 21  START TIME: 11:00 AM  END TIME: 11:50 AM  FACILITATOR: Alicia He RN  TOPIC: DELMAR RN Group: [facility] 55+ Program  TRACK: a1    NUMBER OF PARTICIPANTS: 6    Summary of Group / Topics Discussed:  Foundations of Health: Sleep: Pathophysiology of sleep disorders: The anatomy of sleep was reviewed including structures, stages, mechanisms, and the purpose that sleep has on the brain. Sleep disorders were discussed within the group with a focus on gaining knowledge about the etiology and pathophysiology of insomnia, sleep apnea, restless leg syndrome, narcolepsy, medications that can cause risk for sleeping disorders, and other symptoms/factors that may interfere with sleep. Risk factors for developing sleep disorders were discussed and treatments/pharmacological options were explored.     Patient Session Goals / Objectives:  ? Described the effect and purpose of sleep on the brain and identify the amount of sleep needed  ? Identified differences between sleep disorders and risks associated with sleep disorders  ? Described the connection between sleep disturbances and mental illness    Increased knowledge about treatments for sleep disorders  Telemedicine Visit: The patient's condition can be safely assessed and treated via synchronous audio and visual telemedicine encounter.      Reason for Telemedicine Visit: Services only offered telehealth    Originating Site (Patient Location): Patient's home    Distant Site (Provider Location): Provider Remote Setting    Consent:  The patient/guardian has verbally consented to: the potential risks and benefits of telemedicine (video visit) versus in person care; bill my insurance or make self-payment for services provided; and responsibility for payment of non-covered services.     Mode of  Communication:  Video Conference via [employer]    As the provider I attest to compliance with applicable laws and regulations related to telemedicine.       Patient Participation / Response:  Fully participated with the group by sharing personal reflections / insights and openly received / provided feedback with other participants.    Identified / Expressed personal readiness to practice skills    Treatment Plan:  Patient has a current master individualized treatment plan.  See Epic treatment plan for more information.    Alicia He RN

## 2021-01-14 NOTE — GROUP NOTE
Telemedicine Visit: The patient's condition can be safely assessed and treated via synchronous audio and visual telemedicine encounter.      Reason for Telemedicine Visit: Services only offered telehealth    Originating Site (Patient Location): Patient's home    Distant Site (Provider Location): Provider Remote Setting    Consent:  The patient/guardian has verbally consented to: the potential risks and benefits of telemedicine (video visit) versus in person care; bill my insurance or make self-payment for services provided; and responsibility for payment of non-covered services.     Mode of Communication:  Video Conference via [employer]    As the provider I attest to compliance with applicable laws and regulations related to telemedicine.  Process Group Note    PATIENT'S NAME: Jammie Mariee  MRN:   [id number]  :   1965  ACCT. NUMBER: [id number]  DATE OF SERVICE: 21  START TIME:  9:00 AM  END TIME:  9:50 AM  FACILITATOR: Ermelinda Flynn LMFT  TOPIC:  Process Group    Diagnoses:  296.53 Bipolar I Disorder Current or Most Recent Episode Depressed, Severe    4. Other Diagnoses that is relevant to services:   300.00 (F41.9) Unspecified Anxiety Disorder       [facility] 55+ Program  TRACK:A1     NUMBER OF PARTICIPANTS: 7          Data:    Session content: At the start of this group, patients were invited to check in by identifying themselves, describing their current emotional status, and identifying issues to address in this group.   Area(s) of treatment focus addressed in this session included Symptom Management.  Processed struggling with irritability and difficulty staying focused. Identifies she has not had any panic attacks. Explored ways to ask for support to remember skills and resources to support her at home.    Therapeutic Interventions/Treatment Strategies:  Psychotherapist offered support, feedback and validation and reinforced use of skills. Treatment modalities used include  "Cognitive Behavioral Therapy. Interventions include Coping Skills: Discussed use of self-soothe skills to decrease distress in the body, Assisted patient in identifying 1-2 healthy distraction skills to reduce overall distress and Discussed how the use of intentional \"in the moment\" actions can help reduce distress.    Assessment:    Patient response:   Patient responded to session by accepting feedback, giving feedback, listening, focusing on goals and verbalizing understanding    Possible barriers to participation / learning include: and no barriers identified    Health Issues:   None reported       Substance Use Review:   Substance Use: No active concerns identified.    Mental Status/Behavioral Observations  Appearance:   Appropriate   Eye Contact:   Good   Psychomotor Behavior: Normal   Attitude:   Cooperative   Orientation:   All  Speech   Rate / Production: Normal    Volume:  Normal   Mood:    Anxious   Affect:    Worrisome   Thought Content:   Clear  Thought Form:  Coherent  Logical     Insight:    Poor     Plan:     Safety Plan: No current safety concerns identified.  Recommended that patient call 911 or go to the local ED should there be a change in any of these risk factors.     Barriers to treatment: None identified    Patient Contracts (see media tab):  None    Substance Use: Not addressed in session     Continue or Discharge: Patient will continue in 55+ Program (55+) as planned. Patient is likely to benefit from learning and using skills as they work toward the goals identified in their treatment plan.      ARLENE Savage  January 14, 2021  "

## 2021-01-15 ENCOUNTER — HEALTH MAINTENANCE LETTER (OUTPATIENT)
Age: 56
End: 2021-01-15

## 2021-01-18 ENCOUNTER — HOSPITAL ENCOUNTER (OUTPATIENT)
Dept: BEHAVIORAL HEALTH | Facility: CLINIC | Age: 56
End: 2021-01-18
Attending: PSYCHIATRY & NEUROLOGY
Payer: COMMERCIAL

## 2021-01-18 PROCEDURE — G0177 OPPS/PHP; TRAIN & EDUC SERV: HCPCS | Mod: GT | Performed by: OCCUPATIONAL THERAPIST

## 2021-01-18 PROCEDURE — 90853 GROUP PSYCHOTHERAPY: CPT | Mod: 95

## 2021-01-18 PROCEDURE — 90853 GROUP PSYCHOTHERAPY: CPT | Mod: GT

## 2021-01-18 NOTE — GROUP NOTE
Telemedicine Visit: The patient's condition can be safely assessed and treated via synchronous audio and visual telemedicine encounter.      Reason for Telemedicine Visit: Services only offered telehealth    Originating Site (Patient Location): Patient's home    Distant Site (Provider Location): Provider Remote Setting    Consent:  The patient/guardian has verbally consented to: the potential risks and benefits of telemedicine (video visit) versus in person care; bill my insurance or make self-payment for services provided; and responsibility for payment of non-covered services.     Mode of Communication:  Video Conference via [employer]    As the provider I attest to compliance with applicable laws and regulations related to telemedicine.  Process Group Note    PATIENT'S NAME: Jammie Mariee  MRN:   [id number]  :   1965  ACCT. NUMBER: [id number]  DATE OF SERVICE: 21  START TIME:  9:00 AM  END TIME:  9:50 AM  FACILITATOR: Ermelinda Flynn LMFT  TOPIC:  Process Group    Diagnoses:  296.53 Bipolar I Disorder Current or Most Recent Episode Depressed, Severe    4. Other Diagnoses that is relevant to services:   300.00 (F41.9) Unspecified Anxiety Disorder       [facility] 55+ Program  TRACK: A1    NUMBER OF PARTICIPANTS: 5          Data:    Session content: At the start of this group, patients were invited to check in by identifying themselves, describing their current emotional status, and identifying issues to address in this group.   Area(s) of treatment focus addressed in this session included Symptom Management.  Processed feeling tired, physically and mentally. Reports she had positive experiences with her  and daughter over the weekend. Reports struggling with boredom it causing her to feel more anxious. Explored ways to cope with anxiety and taking steps to focus on her health.     Therapeutic Interventions/Treatment Strategies:  Psychotherapist offered support, feedback and  "validation and reinforced use of skills. Treatment modalities used include Cognitive Behavioral Therapy. Interventions include Cognitive Restructuring:  Assisted patient in formulating new neutral/positive alternatives to challenge less helpful / ineffective thoughts and Coping Skills: Discussed how the use of intentional \"in the moment\" actions can help reduce distress.    Assessment:    Patient response:   Patient responded to session by accepting feedback, listening, accepting support and verbalizing understanding    Possible barriers to participation / learning include: and no barriers identified    Health Issues:   None reported       Substance Use Review:   Substance Use: No active concerns identified.    Mental Status/Behavioral Observations  Appearance:   Appropriate   Eye Contact:   Good   Psychomotor Behavior: Normal   Attitude:   Cooperative   Orientation:   All  Speech   Rate / Production: Normal    Volume:  Normal   Mood:    Anxious   Affect:    Worrisome   Thought Content:   Clear  Thought Form:  Coherent  Logical     Insight:    Fair     Plan:     Safety Plan: No current safety concerns identified.  Recommended that patient call 911 or go to the local ED should there be a change in any of these risk factors.     Barriers to treatment: None identified    Patient Contracts (see media tab):  None    Substance Use: Not addressed in session     Continue or Discharge: Patient will continue in 55+ Program (55+) as planned. Patient is likely to benefit from learning and using skills as they work toward the goals identified in their treatment plan.      ARLENE Savage  January 18, 2021  "

## 2021-01-18 NOTE — GROUP NOTE
Psychoeducation Group Note    PATIENT'S NAME: Jammie Mariee  MRN:   [id number]  :   1965  ACCT. NUMBER: [id number]  DATE OF SERVICE: 21  START TIME: 10:00 AM  END TIME: 10:50 AM  FACILITATOR: Roseanne Valdez OTR/L  TOPIC: MH Life Skills Group: Sensory Approaches in Mental Health  [facility] 55+ [facility]  TRACK: A1    NUMBER OF PARTICIPANTS: 5    Telemedicine Visit: The patient's condition can be safely assessed and treated via synchronous audio and visual telemedicine encounter.      Reason for Telemedicine Visit: Services only offered telehealth    Originating Site (Patient Location): Patient's home    Distant Site (Provider Location): [facility] [place] [address]    Consent:  The patient/guardian has verbally consented to: the potential risks and benefits of telemedicine (video visit) versus in person care; bill my insurance or make self-payment for services provided; and responsibility for payment of non-covered services.     Mode of Communication:  Video Conference via Zoom    As the provider I attest to compliance with applicable laws and regulations related to telemedicine.     Summary of Group / Topics Discussed:  Sensory Approaches in Mental Health:  Coping Through the Senses Introduction Part 1: Patients were introduced and taught about neurosensory based skills and strategies related to supporting effective self regulation skills.  Patients were taught about the external sensory systems and how they can be used for coping with mental health symptoms and stressors.  Patients were provided with an opportunity to increase self-awareness of helpful sensory input and self care activities. Patients were introduced on how to create supportive environments that encourage use of these skills.         Patient Session Goals / Objectives:    Identified specific and individualized neurosensory skills to help when distressed      Identified skills learned and how this  applies to current daily life    Established a plan for practice of these skills in their own environments        Patient Participation / Response:  Fully participated with the group by sharing personal reflections / insights and openly received / provided feedback with other participants.    Patient presentation: constricted affect; low mood; active in group discussion, Demonstrated understanding of content through identifying helpful sensory input she uses (such as breathing, nature)  and Patient would benefit from additional opportunities to practice the content to be able to generalize it to their everyday life with increased intentionality, consistency, and efficacy in support of their psychiatric recovery    Treatment Plan:  Patient has a current master individualized treatment plan.  See Epic treatment plan for more information.    Roseanne Valdez, OTR/L

## 2021-01-18 NOTE — GROUP NOTE
Telemedicine Visit: The patient's condition can be safely assessed and treated via synchronous audio and visual telemedicine encounter.      Reason for Telemedicine Visit: Services only offered telehealth    Originating Site (Patient Location): Patient's home    Distant Site (Provider Location): Provider Remote Setting    Consent:  The patient/guardian has verbally consented to: the potential risks and benefits of telemedicine (video visit) versus in person care; bill my insurance or make self-payment for services provided; and responsibility for payment of non-covered services.     Mode of Communication:  Video Conference via [employer]    As the provider I attest to compliance with applicable laws and regulations related to telemedicine.  Psychotherapy Group Note    PATIENT'S NAME: Jammie Mariee  MRN:   [id number]  :   1965  ACCT. NUMBER: [id number]  DATE OF SERVICE: 21  START TIME: 11:00 AM  END TIME: 11:50 AM  FACILITATOR: Ermelinda Flynn LMFT  TOPIC:  EBP Group: Symptom Awareness  [facility] 55+ Program  TRACK: A1    NUMBER OF PARTICIPANTS: 5    Summary of Group / Topics Discussed:  Symptom Awareness: Mood Disorders: Patients received a general overview of mood disorders including depressive disorders, anxiety disorders, and bipolar disorders and how it relates to their current symptoms. The purpose is to promote understanding of their diagnoses and how it impacts their functioning. Patients reviewed their current awareness of symptoms and diagnoses. Patients received information regarding diagnoses, etiology, cultural, and environmental factors as well as impact on functioning.     Patient Session Goals / Objectives:    Discussed patient individual symptoms and experiences    Reviewed diagnostic criteria and etiology of diagnoses         Patient Participation / Response:  Moderately participated, sharing some personal reflections / insights and adequately adequately received / provided  feedback with other participants.    Demonstrated understanding of how information regarding symptoms can assist in management of symptoms and Practiced skills in session    Treatment Plan:  Patient has a current master individualized treatment plan.  See Epic treatment plan for more information.    ARLENE Savage

## 2021-01-19 ENCOUNTER — HOSPITAL ENCOUNTER (OUTPATIENT)
Dept: BEHAVIORAL HEALTH | Facility: CLINIC | Age: 56
End: 2021-01-19
Attending: PSYCHIATRY & NEUROLOGY
Payer: COMMERCIAL

## 2021-01-19 PROCEDURE — 90853 GROUP PSYCHOTHERAPY: CPT | Mod: 95

## 2021-01-19 PROCEDURE — G0177 OPPS/PHP; TRAIN & EDUC SERV: HCPCS | Mod: 95 | Performed by: OCCUPATIONAL THERAPIST

## 2021-01-19 PROCEDURE — G0177 OPPS/PHP; TRAIN & EDUC SERV: HCPCS | Mod: 95

## 2021-01-19 NOTE — GROUP NOTE
Telemedicine Visit: The patient's condition can be safely assessed and treated via synchronous audio and visual telemedicine encounter.      Reason for Telemedicine Visit: Services only offered telehealth    Originating Site (Patient Location): Patient's home    Distant Site (Provider Location): Provider Remote Setting    Consent:  The patient/guardian has verbally consented to: the potential risks and benefits of telemedicine (video visit) versus in person care; bill my insurance or make self-payment for services provided; and responsibility for payment of non-covered services.     Mode of Communication:  Video Conference via [employer]    As the provider I attest to compliance with applicable laws and regulations related to telemedicine.  Process Group Note    PATIENT'S NAME: Jammie Mariee  MRN:   [id number]  :   1965  ACCT. NUMBER: [id number]  DATE OF SERVICE: 21  START TIME: 10:00 AM  END TIME: 10:50 AM  FACILITATOR: Ermelinda Flynn LMFT  TOPIC:  Process Group    Diagnoses:  296.53 Bipolar I Disorder Current or Most Recent Episode Depressed, Severe    4. Other Diagnoses that is relevant to services:   300.00 (F41.9) Unspecified Anxiety Disorder       [facility] 55+ Program  TRACK: A1    NUMBER OF PARTICIPANTS: 4          Data:    Session content: At the start of this group, patients were invited to check in by identifying themselves, describing their current emotional status, and identifying issues to address in this group.   Area(s) of treatment focus addressed in this session included Symptom Management.  Processed feeling anxious and worried about activities she should do when she is done with group. Explored ways to structure her day to reduce anxiety. Reports feeling anxious from nicotine cravings and reports she will wear her patch to reduce cravings. No safety concerns.     Therapeutic Interventions/Treatment Strategies:  Psychotherapist offered support, feedback and validation and  "reinforced use of skills. Treatment modalities used include Cognitive Behavioral Therapy. Interventions include Coping Skills: Assisted patient in identifying 1-2 healthy distraction skills to reduce overall distress and Discussed how the use of intentional \"in the moment\" actions can help reduce distress.    Assessment:    Patient response:   Patient responded to session by accepting feedback, giving feedback, listening and verbalizing understanding    Possible barriers to participation / learning include: and no barriers identified    Health Issues:   None reported       Substance Use Review:   Substance Use: No active concerns identified.    Mental Status/Behavioral Observations  Appearance:   Appropriate   Eye Contact:   Good   Psychomotor Behavior: Normal   Attitude:   Cooperative   Orientation:   All  Speech   Rate / Production: Normal    Volume:  Normal   Mood:    Anxious   Affect:    Worrisome   Thought Content:   Clear  Thought Form:  Coherent  Logical     Insight:    Fair     Plan:     Safety Plan: No current safety concerns identified.  Recommended that patient call 911 or go to the local ED should there be a change in any of these risk factors.     Barriers to treatment: None identified    Patient Contracts (see media tab):  None    Substance Use: Not addressed in session     Continue or Discharge: Patient will continue in 55+ Program (55+) as planned. Patient is likely to benefit from learning and using skills as they work toward the goals identified in their treatment plan.      ARLENE Savage  January 19, 2021  "

## 2021-01-19 NOTE — GROUP NOTE
Psychoeducation Group Note    PATIENT'S NAME: Jammie Mariee  MRN:   [id number]  :   1965  ACCT. NUMBER: [id number]  DATE OF SERVICE: 21  START TIME:  9:00 AM  END TIME:  9:50 AM  FACILITATOR: Alicia He RN  TOPIC: DELMAR RN Group: Specialty [facility] 55+ Program  TRACK: a1    NUMBER OF PARTICIPANTS: 4    Summary of Group / Topics Discussed:  Specialty [facility]:  [facility]: Journaling   We discussed some benefits of journaling, including : increased creativity, stress reduction, improved memory, increased emotional awareness. The group will complete an exercise together.  Patients will be given time to explore an exercise on their own, and come back to share with the group.     Patient Session Goals / Objectives:   ? Pts learned about the benefits of journaling  ? Pts participated in a group activity  ? Pts completed their own acrostic poem about something they feel passionate about and shared with the group    Telemedicine Visit: The patient's condition can be safely assessed and treated via synchronous audio and visual telemedicine encounter.      Reason for Telemedicine Visit: Services only offered telehealth    Originating Site (Patient Location): Patient's home    Distant Site (Provider Location): Provider Remote Setting    Consent:  The patient/guardian has verbally consented to: the potential risks and benefits of telemedicine (video visit) versus in person care; bill my insurance or make self-payment for services provided; and responsibility for payment of non-covered services.     Mode of Communication:  Video Conference via [employer]    As the provider I attest to compliance with applicable laws and regulations related to telemedicine.           Patient Participation / Response:  Fully participated with the group by sharing personal reflections / insights and openly received / provided feedback with other participants.    Demonstrated understanding of topics  discussed through group discussion and participation    Treatment Plan:  Patient has a current master individualized treatment plan.  See Epic treatment plan for more information.    Alicia He RN

## 2021-01-19 NOTE — GROUP NOTE
Psychoeducation Group Note    PATIENT'S NAME: Jammie Mariee  MRN:   [id number]  :   1965  ACCT. NUMBER: [id number]  DATE OF SERVICE: 21  START TIME: 11:00 AM  END TIME: 11:50 AM  FACILITATOR: Roseanne Valdez OTR/L  TOPIC: MH Life Skills Group: Sensory Approaches in Mental Health  [facility] 55+ [facility]  TRACK: A1    NUMBER OF PARTICIPANTS: 4    Telemedicine Visit: The patient's condition can be safely assessed and treated via synchronous audio and visual telemedicine encounter.      Reason for Telemedicine Visit: Services only offered telehealth    Originating Site (Patient Location): Patient's home    Distant Site (Provider Location): [facility] [place] [address]    Consent:  The patient/guardian has verbally consented to: the potential risks and benefits of telemedicine (video visit) versus in person care; bill my insurance or make self-payment for services provided; and responsibility for payment of non-covered services.     Mode of Communication:  Video Conference via Zoom    As the provider I attest to compliance with applicable laws and regulations related to telemedicine.     Summary of Group / Topics Discussed:  Sensory Approaches in Mental Health:  Coping Through the Senses Introduction Part 2: Patients were introduced and taught about neurosensory based skills and strategies related to supporting effective self regulation skills.  Patients were taught about the internal sensory systems (proprioception, vestibular, deep pressure and interoception) and how they can be used for coping with mental health symptoms and stressors.  Patients were provided with an opportunity to increase self-awareness of helpful sensory input and self care activities. Patients were introduced on how to create supportive environments that encourage use of these skills.         Patient Session Goals / Objectives:    Identified specific and individualized neurosensory skills to  help when distressed      Identified skills learned and how this applies to current daily life    Established a plan for practice of these skills in their own environments        Patient Participation / Response:  Moderately participated, sharing some personal reflections / insights and adequately adequately received / provided feedback with other participants.    Patient presentation: quiet in group today; seemed attentive to discussion and Patient would benefit from additional opportunities to practice the content to be able to generalize it to their everyday life with increased intentionality, consistency, and efficacy in support of their psychiatric recovery    Treatment Plan:  Patient has a current master individualized treatment plan.  See Epic treatment plan for more information.    Roseanne Valdez, OTR/L

## 2021-01-21 ENCOUNTER — HOSPITAL ENCOUNTER (OUTPATIENT)
Dept: BEHAVIORAL HEALTH | Facility: CLINIC | Age: 56
End: 2021-01-21
Attending: PSYCHIATRY & NEUROLOGY
Payer: COMMERCIAL

## 2021-01-21 PROCEDURE — G0177 OPPS/PHP; TRAIN & EDUC SERV: HCPCS | Mod: 95

## 2021-01-21 PROCEDURE — G0177 OPPS/PHP; TRAIN & EDUC SERV: HCPCS | Mod: 95 | Performed by: OCCUPATIONAL THERAPIST

## 2021-01-21 PROCEDURE — 90853 GROUP PSYCHOTHERAPY: CPT | Mod: GT

## 2021-01-21 NOTE — GROUP NOTE
Psychoeducation Group Note    PATIENT'S NAME: Jammie Mariee  MRN:   [id number]  :   1965  ACCT. NUMBER: [id number]  DATE OF SERVICE: 21  START TIME: 10:00 AM  END TIME: 10:50 AM  FACILITATOR: Roseanne Valdez OTR/L  TOPIC: MH Life Skills Group: Lifestyle Balance and Structure  [facility] 55+ Program  TRACK: A1    NUMBER OF PARTICIPANTS: 4    Telemedicine Visit: The patient's condition can be safely assessed and treated via synchronous audio and visual telemedicine encounter.      Reason for Telemedicine Visit: Services only offered telehealth    Originating Site (Patient Location): Patient's home    Distant Site (Provider Location): [facility] [place] [address]    Consent:  The patient/guardian has verbally consented to: the potential risks and benefits of telemedicine (video visit) versus in person care; bill my insurance or make self-payment for services provided; and responsibility for payment of non-covered services.     Mode of Communication:  Video Conference via Zoom    As the provider I attest to compliance with applicable laws and regulations related to telemedicine.     Summary of Group / Topics Discussed:  Lifestyle Balance and Strucure:  Occupations Practice: Patients were provided with an overview on the importance of daily occupations in support of mental health management.  Patients were assisted to identify a task/activity/occupation that they needed to/wanted to/had to complete and worked on this in session.  Patients identified what skills helped them work on the activity, barriers encountered, progress made, and feelings as a result  of the activity.  Discussed ways to apply knowledge learned to everday life.  Patients were provided with psychoeducation on how to use engagement and promotion of positive mental health through meaningful occupations.  Patients gained awareness of the connection between who they are (self identity) with what they  "do.    Patient Session Goals / Objectives:    Increased awareness of how patient s functioning in identified meaningful occupations are impacted by their mental health status     Developed performance skills and performance patterns to enhance occupational engagement and practiced these in a supportive environment in session    Explored ways to generalize new awareness and skills to their everyday life        Patient Participation / Response:  Fully participated with the group by sharing personal reflections / insights and openly received / provided feedback with other participants.    Patient presentation: anxious mood; reported feeling \"overwhelmed\" at beginning of session; active in discussion with writer, Demonstrated understanding of content through identifying how she was feeling and barriers to task engagement; open to validation and problem solving; working on ways to increase daily structure but also manage low motivation/energy/anxiety , Verbalized understanding of content and Patient would benefit from additional opportunities to practice the content to be able to generalize it to their everyday life with increased intentionality, consistency, and efficacy in support of their psychiatric recovery    Treatment Plan:  Patient has a current master individualized treatment plan.  See Epic treatment plan for more information.    Roseanne Valdez, OTR/L  "

## 2021-01-21 NOTE — GROUP NOTE
Telemedicine Visit: The patient's condition can be safely assessed and treated via synchronous audio and visual telemedicine encounter.      Reason for Telemedicine Visit: Services only offered telehealth    Originating Site (Patient Location): Patient's home    Distant Site (Provider Location): Provider Remote Setting    Consent:  The patient/guardian has verbally consented to: the potential risks and benefits of telemedicine (video visit) versus in person care; bill my insurance or make self-payment for services provided; and responsibility for payment of non-covered services.     Mode of Communication:  Video Conference via [employer]    As the provider I attest to compliance with applicable laws and regulations related to telemedicine.  Process Group Note    PATIENT'S NAME: Jammie Mariee  MRN:   [id number]  :   1965  ACCT. NUMBER: [id number]  DATE OF SERVICE: 21  START TIME:  9:00 AM  END TIME:  9:50 AM  FACILITATOR: Ermelinda Flynn LMFT  TOPIC:  Process Group    Diagnoses:  296.53 Bipolar I Disorder Current or Most Recent Episode Depressed, Severe    4. Other Diagnoses that is relevant to services:   300.00 (F41.9) Unspecified Anxiety Disorder       [facility] 55+ Program  TRACK: A1    NUMBER OF PARTICIPANTS: 4          Data:    Session content: At the start of this group, patients were invited to check in by identifying themselves, describing their current emotional status, and identifying issues to address in this group.   Area(s) of treatment focus addressed in this session included Symptom Management.  Processed struggling on days without group with high anxiety. Reports she stayed in bed most of the day and struggled with rumination and feeling exhausted. Explored ways to rest and self soothe without ruminating on negative self talk. Reports her daughter is spending the weekend with her daughter and . Looking forward to spending the weekend with them.    Therapeutic  "Interventions/Treatment Strategies:  Psychotherapist offered support, feedback and validation and reinforced use of skills. Treatment modalities used include Cognitive Behavioral Therapy. Interventions include Coping Skills: Discussed how the use of intentional \"in the moment\" actions can help reduce distress and Assisted patient in understanding the purpose of planning / creating / participating / sharing in positive experiences.    Assessment:    Patient response:   Patient responded to session by accepting feedback, giving feedback, listening and verbalizing understanding    Possible barriers to participation / learning include: and no barriers identified    Health Issues:   None reported       Substance Use Review:   Substance Use: No active concerns identified.    Mental Status/Behavioral Observations  Appearance:   Appropriate   Eye Contact:   Good   Psychomotor Behavior: Normal   Attitude:   Cooperative   Orientation:   All  Speech   Rate / Production: Normal    Volume:  Normal   Mood:    Anxious   Affect:    Worrisome   Thought Content:   Rumination  Thought Form:  Coherent  Logical     Insight:    Poor     Plan:     Safety Plan: No current safety concerns identified.  Recommended that patient call 911 or go to the local ED should there be a change in any of these risk factors.     Barriers to treatment: None identified    Patient Contracts (see media tab):  None    Substance Use: Not addressed in session     Continue or Discharge: Patient will continue in 55+ Program (55+) as planned. Patient is likely to benefit from learning and using skills as they work toward the goals identified in their treatment plan.      ARLENE Savage  January 21, 2021  "

## 2021-01-23 NOTE — GROUP NOTE
Psychoeducation Group Note    PATIENT'S NAME: Jammie Mariee  MRN:   [id number]  :   1965  ACCT. NUMBER: [id number]  DATE OF SERVICE: 21  START TIME: 11:00 AM  END TIME: 11:50 AM  FACILITATOR: Alicia He RN  TOPIC: MH RN Group: Brain Health  [facility] 55+ Program  TRACK: a1    NUMBER OF PARTICIPANTS: 4    Summary of Group / Topics Discussed:  Brain Health:  Pathophysiology of Mood Disorders: Patients were educated on mood disorder etiology and neuroscience, risk factors, symptoms, and pharmacologic, psychotherapeutic, and complementary treatment options. Patients were guided on a discussion of mental, behavioral, and physical symptoms and shared their symptoms with the group.     Patient Session Goals / Objectives:  ? Described what mood disorders are and identified risk factors   ? Explained how chemical imbalances in the brain can cause symptoms and how medications work to reverse this imbalance   ? Identified and described pharmacologic, psychotherapeutic, and complementary treatment options  Telemedicine Visit: The patient's condition can be safely assessed and treated via synchronous audio and visual telemedicine encounter.      Reason for Telemedicine Visit: Services only offered telehealth    Originating Site (Patient Location): Patient's home    Distant Site (Provider Location): Provider Remote Setting    Consent:  The patient/guardian has verbally consented to: the potential risks and benefits of telemedicine (video visit) versus in person care; bill my insurance or make self-payment for services provided; and responsibility for payment of non-covered services.     Mode of Communication:  Video Conference via [employer]    As the provider I attest to compliance with applicable laws and regulations related to telemedicine.       Patient Participation / Response:  Fully participated with the group by sharing personal reflections / insights and openly received / provided feedback  with other participants.    Demonstrated understanding of topics discussed through group discussion and participation    Treatment Plan:  Patient has a current master individualized treatment plan.  See Epic treatment plan for more information.    Alicia He RN

## 2021-01-25 ENCOUNTER — HOSPITAL ENCOUNTER (OUTPATIENT)
Dept: BEHAVIORAL HEALTH | Facility: CLINIC | Age: 56
End: 2021-01-25
Attending: PSYCHIATRY & NEUROLOGY
Payer: COMMERCIAL

## 2021-01-25 PROCEDURE — 90853 GROUP PSYCHOTHERAPY: CPT | Mod: 95

## 2021-01-25 PROCEDURE — 90853 GROUP PSYCHOTHERAPY: CPT | Mod: GT

## 2021-01-25 PROCEDURE — G0177 OPPS/PHP; TRAIN & EDUC SERV: HCPCS | Mod: 95 | Performed by: OCCUPATIONAL THERAPIST

## 2021-01-25 NOTE — GROUP NOTE
Psychoeducation Group Note    PATIENT'S NAME: Jammie Mariee  MRN:   [id number]  :   1965  ACCT. NUMBER: [id number]  DATE OF SERVICE: 21  START TIME: 10:00 AM  END TIME: 10:50 AM  FACILITATOR: Roseanne Valdez OTR/L  TOPIC: MH Life Skills Group: Lifestyle Balance and Structure  [facility] 55+ Program  TRACK: A1    NUMBER OF PARTICIPANTS: 5    Telemedicine Visit: The patient's condition can be safely assessed and treated via synchronous audio and visual telemedicine encounter.      Reason for Telemedicine Visit: Services only offered telehealth    Originating Site (Patient Location): Patient's home    Distant Site (Provider Location): [facility] [place] [address]    Consent:  The patient/guardian has verbally consented to: the potential risks and benefits of telemedicine (video visit) versus in person care; bill my insurance or make self-payment for services provided; and responsibility for payment of non-covered services.     Mode of Communication:  Video Conference via Zoom    As the provider I attest to compliance with applicable laws and regulations related to telemedicine.     Summary of Group / Topics Discussed:  Lifestyle Balance and Strucure:  Occupations and Roles: Patients were provided with an overview on the importance of daily occupations and roles in support of mental health management.  Patients were assisted to establish, restore, and/or modify performance skills and patterns for improved engagement and promotion of positive mental health through meaningful occupations and roles.  Patients gained awareness of the connection between who they are (self identity) with what they do.    Patient Session Goals / Objectives:    Increased awareness of how patient s functioning in identified meaningful occupations and roles are impacted by their mental health status     Developed performance skills and performance patterns to enhance occupational  engagement    Explored ways to generalize new awareness and skills to their everyday life        Patient Participation / Response:  Fully participated with the group by sharing personal reflections / insights and openly received / provided feedback with other participants.    Patient presentation: constricted affect that brightened at times; discussed how half-way isn't anything like she thought it would be; connecting with others is very important to her, Verbalized understanding of content and Patient would benefit from additional opportunities to practice the content to be able to generalize it to their everyday life with increased intentionality, consistency, and efficacy in support of their psychiatric recovery    Treatment Plan:  Patient has a current master individualized treatment plan.  See Epic treatment plan for more information.    Roseanne Valdez, OTR/L

## 2021-01-25 NOTE — GROUP NOTE
Telemedicine Visit: The patient's condition can be safely assessed and treated via synchronous audio and visual telemedicine encounter.      Reason for Telemedicine Visit: Services only offered telehealth    Originating Site (Patient Location): Patient's home    Distant Site (Provider Location): Provider Remote Setting    Consent:  The patient/guardian has verbally consented to: the potential risks and benefits of telemedicine (video visit) versus in person care; bill my insurance or make self-payment for services provided; and responsibility for payment of non-covered services.     Mode of Communication:  Video Conference via [employer]    As the provider I attest to compliance with applicable laws and regulations related to telemedicine.  Psychotherapy Group Note    PATIENT'S NAME: Jammie Mariee  MRN:   [id number]  :   1965  ACCT. NUMBER: [id number]  DATE OF SERVICE: 21  START TIME: 11:00 AM  END TIME: 11:50 AM  FACILITATOR: Ermelinda Flynn LMFT  TOPIC:  EBP Group: Symptom Awareness  [facility] 55+ Program  TRACK: A1    NUMBER OF PARTICIPANTS: 3    Summary of Group / Topics Discussed:  Symptom Awareness: Symptom Observation and Tracking: An overview of symptom observation and tracking was presented to help patients identify specific symptoms and identify patterns. This topic will also assist patient in identifying progress towards goal of decreasing severity of symptoms and increasing overall functioning. Patients completed a symptom check list in session. Patient was assisted in identifying baseline functioning, patterns, and ways to assess current symptoms. Patient was also assisted in identifying a tool or strategy to continue to track or monitor symptoms over a period of time.       Patient Session Goals / Objectives:    Identified patient individual symptoms and experiences    Identified potential symptom patterns and factors that contribute to changes in symptom severity      Patient  Participation / Response:  Fully participated with the group by sharing personal reflections / insights and openly received / provided feedback with other participants.    Demonstrated understanding of topics discussed through group discussion and participation and Practiced skills in session    Treatment Plan:  Patient has a current master individualized treatment plan.  See Epic treatment plan for more information.    ARLENE Savage

## 2021-01-26 ENCOUNTER — HOSPITAL ENCOUNTER (OUTPATIENT)
Dept: BEHAVIORAL HEALTH | Facility: CLINIC | Age: 56
End: 2021-01-26
Attending: PSYCHIATRY & NEUROLOGY
Payer: COMMERCIAL

## 2021-01-26 PROCEDURE — G0177 OPPS/PHP; TRAIN & EDUC SERV: HCPCS | Mod: GT | Performed by: OCCUPATIONAL THERAPIST

## 2021-01-26 PROCEDURE — 90853 GROUP PSYCHOTHERAPY: CPT | Mod: GT

## 2021-01-26 PROCEDURE — G0177 OPPS/PHP; TRAIN & EDUC SERV: HCPCS | Mod: 95

## 2021-01-26 NOTE — GROUP NOTE
Psychoeducation Group Note    PATIENT'S NAME: Jammie Mariee  MRN:   [id number]  :   1965  ACCT. NUMBER: [id number]  DATE OF SERVICE: 21  START TIME: 11:00 AM  END TIME: 11:50 AM  FACILITATOR: Roseanne Valdez OTR/L  TOPIC: MH Life Skills Group: Sensory Approaches in Mental Health  [facility] 55+ [facility]  TRACK: A1    NUMBER OF PARTICIPANTS: 4    Telemedicine Visit: The patient's condition can be safely assessed and treated via synchronous audio and visual telemedicine encounter.      Reason for Telemedicine Visit: Services only offered telehealth    Originating Site (Patient Location): Patient's home    Distant Site (Provider Location): [facility] [place] [address]     Consent:  The patient/guardian has verbally consented to: the potential risks and benefits of telemedicine (video visit) versus in person care; bill my insurance or make self-payment for services provided; and responsibility for payment of non-covered services.     Mode of Communication:  Video Conference via Zoom    As the provider I attest to compliance with applicable laws and regulations related to telemedicine.     Summary of Group / Topics Discussed:  Sensory Approaches in Mental Health:  Sensory Enhanced Mindfulness: Patients were taught and provided with an opportunity to explore and practice how using sensory enhanced mindfulness practices can help them stay grounded in the present moment as a way to manage mental health symptoms and stressors.   Patients were led through a gentle sensory based stretching sequence as a way to practice self care.       Patient Session Goals / Objectives:    Identified how using sensory enhanced mindfulness practices can be used for grounding, stress management, self care, and self regulation      Improved awareness of different types of sensory enhanced mindfulness activities that assist with healthy coping of stress and symptoms      Established a plan for  practice of these skills in their own environments    Practiced and reflected on how to generalize taught skills to their everyday life        Patient Participation / Response:  Fully participated with the group by sharing personal reflections / insights and openly received / provided feedback with other participants.    Patient presentation: congruent affect; active in group discussion and experiential activity, Demonstrated understanding of content through reported she found the activity to be helpful especially when doing it with others  and Patient would benefit from additional opportunities to practice the content to be able to generalize it to their everyday life with increased intentionality, consistency, and efficacy in support of their psychiatric recovery    Treatment Plan:  Patient has a current master individualized treatment plan.  See Epic treatment plan for more information.    Roseanne Valdez, OTR/L

## 2021-01-26 NOTE — GROUP NOTE
Psychoeducation Group Note    PATIENT'S NAME: Jammie Mariee  MRN:   [id number]  :   1965  ACCT. NUMBER: [id number]  DATE OF SERVICE: 21  START TIME:  9:00 AM  END TIME:  9:50 AM  FACILITATOR: Alicia He RN  TOPIC: DELMAR RN Group: Mental Health Maintenance  [facility] 55+ Program  TRACK: a1    NUMBER OF PARTICIPANTS: 4    Summary of Group / Topics Discussed:  Mental Health Maintenance:  Assessments of Strengths: Patients completed a self-reflection on personal strengths worksheet. The concept of personal strength as it relates to resilience were explored. Patients shared responses with the group and participated in discussion.     Patient Session Goals / Objectives:  ? Patients identified 1-3 qualities they consider a personal strength.  ? Patients understood the concept of personal strengths and the connection it has to resiliency  Telemedicine Visit: The patient's condition can be safely assessed and treated via synchronous audio and visual telemedicine encounter.      Reason for Telemedicine Visit: Services only offered telehealth    Originating Site (Patient Location): Patient's home    Distant Site (Provider Location): Provider Remote Setting    Consent:  The patient/guardian has verbally consented to: the potential risks and benefits of telemedicine (video visit) versus in person care; bill my insurance or make self-payment for services provided; and responsibility for payment of non-covered services.     Mode of Communication:  Video Conference via [employer]    As the provider I attest to compliance with applicable laws and regulations related to telemedicine.       Patient Participation / Response:  Fully participated with the group by sharing personal reflections / insights and openly received / provided feedback with other participants.    Identified / Expressed personal readiness to practice skills    Treatment Plan:  Patient has a current master individualized treatment plan.   See Epic treatment plan for more information.    Alicia He RN

## 2021-01-26 NOTE — GROUP NOTE
Telemedicine Visit: The patient's condition can be safely assessed and treated via synchronous audio and visual telemedicine encounter.      Reason for Telemedicine Visit: Services only offered telehealth    Originating Site (Patient Location): Patient's home    Distant Site (Provider Location): Provider Remote Setting    Consent:  The patient/guardian has verbally consented to: the potential risks and benefits of telemedicine (video visit) versus in person care; bill my insurance or make self-payment for services provided; and responsibility for payment of non-covered services.     Mode of Communication:  Video Conference via [employer]    As the provider I attest to compliance with applicable laws and regulations related to telemedicine.  Process Group Note    PATIENT'S NAME: Jammie Mariee  MRN:   [id number]  :   1965  ACCT. NUMBER: [id number]  DATE OF SERVICE: 21  START TIME: 10:00 AM  END TIME: 10:50 AM  FACILITATOR: Ermelinda Flynn LMFT  TOPIC:  Process Group    Diagnoses:  296.53 Bipolar I Disorder Current or Most Recent Episode Depressed, Severe    4. Other Diagnoses that is relevant to services:   300.00 (F41.9) Unspecified Anxiety Disorder       [facility] 55+ Program  TRACK: A1    NUMBER OF PARTICIPANTS: 4          Data:    Session content: At the start of this group, patients were invited to check in by identifying themselves, describing their current emotional status, and identifying issues to address in this group.   Area(s) of treatment focus addressed in this session included Symptom Management.  Processed enjoying her time with her daughter the previous evening and being able to drive her daughter to and from her home. Reports using skills to cope with anxious feelings, like slowing down and focusing on her breath and making plans for aftercare when she discharges from group. Explored ways to celebrate her progress and notice her strengths.     Therapeutic  Interventions/Treatment Strategies:  Psychotherapist offered support, feedback and validation and reinforced use of skills. Treatment modalities used include Cognitive Behavioral Therapy. Interventions include Cognitive Restructuring:  Assisted patient in identifying new neutral/positive core beliefs and Coping Skills: Assisted patient in understanding the purpose of planning / creating / participating / sharing in positive experiences.    Assessment:    Patient response:   Patient responded to session by accepting feedback, giving feedback, listening, focusing on goals and verbalizing understanding    Possible barriers to participation / learning include: and no barriers identified    Health Issues:   None reported       Substance Use Review:   Substance Use: No active concerns identified.    Mental Status/Behavioral Observations  Appearance:   Appropriate   Eye Contact:   Good   Psychomotor Behavior: Normal   Attitude:   Cooperative   Orientation:   All  Speech   Rate / Production: Normal    Volume:  Normal   Mood:    Normal  Affect:    Subdued   Thought Content:   Clear  Thought Form:  Coherent  Logical     Insight:    Fair     Plan:     Safety Plan: No current safety concerns identified.  Recommended that patient call 911 or go to the local ED should there be a change in any of these risk factors.     Barriers to treatment: None identified    Patient Contracts (see media tab):  None    Substance Use: Not addressed in session     Continue or Discharge: Patient will continue in 55+ Program (55+) as planned. Patient is likely to benefit from learning and using skills as they work toward the goals identified in their treatment plan.      ARLENE Savage  January 26, 2021

## 2021-01-27 NOTE — PROGRESS NOTES
Patient Active Problem List   Diagnosis     Bipolar 1 disorder, depressed (H)       Current Outpatient Medications:      divalproex sodium extended-release (DEPAKOTE ER) 250 MG 24 hr tablet, Take 1,000 mg by mouth, Disp: , Rfl:      hydrOXYzine (ATARAX) 25 MG tablet, Take 25 mg by mouth, Disp: , Rfl:      lamoTRIgine (LAMICTAL) 25 MG tablet, Take 1 tab daily for 6 days, then 2 tabs daily for 14 days, then 4 tabs daily for 10 days, Disp: , Rfl:      QUEtiapine (SEROQUEL) 50 MG tablet, Take 7.5 tablets at bedtime and 0.5 tablets daily., Disp: , Rfl:   Psychiatry staffing: case discussed  Diagnosis:    As above.  COPD and anxiety around breathing present.

## 2021-01-27 NOTE — ADDENDUM NOTE
Encounter addended by: Johnie Grayson MD on: 1/27/2021 8:01 AM   Actions taken: Clinical Note Signed

## 2021-01-28 ENCOUNTER — HOSPITAL ENCOUNTER (OUTPATIENT)
Dept: BEHAVIORAL HEALTH | Facility: CLINIC | Age: 56
End: 2021-01-28
Attending: PSYCHIATRY & NEUROLOGY
Payer: COMMERCIAL

## 2021-01-28 PROCEDURE — 90853 GROUP PSYCHOTHERAPY: CPT | Mod: 95

## 2021-01-28 PROCEDURE — G0177 OPPS/PHP; TRAIN & EDUC SERV: HCPCS | Mod: GT | Performed by: OCCUPATIONAL THERAPIST

## 2021-01-28 PROCEDURE — G0177 OPPS/PHP; TRAIN & EDUC SERV: HCPCS | Mod: 95

## 2021-01-28 NOTE — GROUP NOTE
Telemedicine Visit: The patient's condition can be safely assessed and treated via synchronous audio and visual telemedicine encounter.      Reason for Telemedicine Visit: Services only offered telehealth    Originating Site (Patient Location): Patient's home    Distant Site (Provider Location): Provider Remote Setting    Consent:  The patient/guardian has verbally consented to: the potential risks and benefits of telemedicine (video visit) versus in person care; bill my insurance or make self-payment for services provided; and responsibility for payment of non-covered services.     Mode of Communication:  Video Conference via [employer]    As the provider I attest to compliance with applicable laws and regulations related to telemedicine.  Process Group Note    PATIENT'S NAME: Jammie Mariee  MRN:   [id number]  :   1965  ACCT. NUMBER: [id number]  DATE OF SERVICE: 21  START TIME:  9:00 AM  END TIME:  9:50 AM  FACILITATOR: Ermelinda Flynn LMFT  TOPIC:  Process Group    Diagnoses:  296.53 Bipolar I Disorder Current or Most Recent Episode Depressed, Severe    4. Other Diagnoses that is relevant to services:   300.00 (F41.9) Unspecified Anxiety Disorder       [facility] 55+ Program  TRACK: A1    NUMBER OF PARTICIPANTS: 3          Data:    Session content: At the start of this group, patients were invited to check in by identifying themselves, describing their current emotional status, and identifying issues to address in this group.   Area(s) of treatment focus addressed in this session included Symptom Management.  Processed feeling anxious and struggling the previous day without structure to her day. Reports feeling tired and not sleeping well the previous night. Explored ways to bring structure to day and focus on goals. Reports she was able to call regarding smoking cessation program. Looking forward to spending weekend with her . No safety concerns    Therapeutic  Interventions/Treatment Strategies:  Psychotherapist offered support, feedback and validation and reinforced use of skills. Treatment modalities used include Cognitive Behavioral Therapy. Interventions include Behavioral Activation: Encouraged strategies to reduce individual procrastination and increase motivation by increasing goal-directed activities to enhance mood and reduce symptoms..    Assessment:    Patient response:   Patient responded to session by accepting feedback, giving feedback, listening and verbalizing understanding    Possible barriers to participation / learning include: and no barriers identified    Health Issues:   None reported       Substance Use Review:   Substance Use: No active concerns identified.    Mental Status/Behavioral Observations  Appearance:   Appropriate   Eye Contact:   Good   Psychomotor Behavior: Normal   Attitude:   Cooperative   Orientation:   All  Speech   Rate / Production: Normal    Volume:  Normal   Mood:    Anxious   Affect:    Worrisome   Thought Content:   Clear  Thought Form:  Coherent  Logical     Insight:    Fair     Plan:     Safety Plan: No current safety concerns identified.  Recommended that patient call 911 or go to the local ED should there be a change in any of these risk factors.     Barriers to treatment: None identified    Patient Contracts (see media tab):  None    Substance Use: Not addressed in session     Continue or Discharge: Patient will continue in 55+ Program (55+) as planned. Patient is likely to benefit from learning and using skills as they work toward the goals identified in their treatment plan.      ARLENE Savage  January 28, 2021

## 2021-01-28 NOTE — GROUP NOTE
Psychoeducation Group Note    PATIENT'S NAME: Jammie Mariee  MRN:   [id number]  :   1965  ACCT. NUMBER: [id number]  DATE OF SERVICE: 21  START TIME: 10:00 AM  END TIME: 10:50 AM  FACILITATOR: Roseanne Valdez OTR/L  TOPIC: MH Life Skills Group: Lifestyle Balance and Structure  [facility] 55+ Program  TRACK: A1    NUMBER OF PARTICIPANTS: 3    Telemedicine Visit: The patient's condition can be safely assessed and treated via synchronous audio and visual telemedicine encounter.      Reason for Telemedicine Visit: Services only offered telehealth    Originating Site (Patient Location): Patient's home    Distant Site (Provider Location): [facility] [place] [place] [address]    Consent:  The patient/guardian has verbally consented to: the potential risks and benefits of telemedicine (video visit) versus in person care; bill my insurance or make self-payment for services provided; and responsibility for payment of non-covered services.     Mode of Communication:  Video Conference via Zoom    As the provider I attest to compliance with applicable laws and regulations related to telemedicine.     Summary of Group / Topics Discussed:  Lifestyle Balance and Strucure:  Occupations: Patients were provided with an overview on the importance of daily occupations in support of mental health management.  Patients engaged in an experiential activity focused on identifying a task that they needed to/wanted to/had to complete and worked on it in a supportive environment during the session.  Patients then reported on progress made, barriers they encountered and how to apply this knowledge to everyday life.  Patients also identified and discussed feelings and self talk as a result of this experience.  Patients were assisted to establish, restore, and/or modify performance skills and patterns for improved engagement and promotion of positive mental health through meaningful occupations.   Patients gained awareness of the connection between who they are (self identity) with what they do.    Patient Session Goals / Objectives:    Increased awareness of how patient s functioning in identified meaningful occupations are impacted by their mental health status     Identified and worked on meaningful tasks during the session and problem solved barriers as they arose    Developed performance skills and performance patterns to enhance occupational engagement    Explored ways to generalize new awareness and skills to their everyday life        Patient Participation / Response:  Fully participated with the group by sharing personal reflections / insights and openly received / provided feedback with other participants.    Patient presentation: constricted affect; anxious mood; active in experiential activity, Demonstrated understanding of content through identifying and participating in a couple of tasks during the session; reported it was helpful to get started on the tasks during the session  and Patient would benefit from additional opportunities to practice the content to be able to generalize it to their everyday life with increased intentionality, consistency, and efficacy in support of their psychiatric recovery    Treatment Plan:  Patient has a current master individualized treatment plan.  See Epic treatment plan for more information.    Roseanne Valdez, OTR/L

## 2021-01-28 NOTE — GROUP NOTE
Psychoeducation Group Note    PATIENT'S NAME: Jammie Mariee  MRN:   [id number]  :   1965  ACCT. NUMBER: [id number]  DATE OF SERVICE: 21  START TIME: 11:00 AM  END TIME: 11:50 AM  FACILITATOR: Alicia He RN  TOPIC: MH RN Group: Brain Health  [facility] 55+ Program  TRACK: a1    NUMBER OF PARTICIPANTS: 3    Summary of Group / Topics Discussed:  Brain Health:  Pathophysiology of positive and negative thinking: Patients were educated on the psychological and physiological benefits of practicing positive thinking and then discussed within the group. Patients were then guided on a positive thinking experiential to practice the skill. After experiential, the exercise was discussed in the group and each patient was encouraged to identify one method of integrating positive thinking into their daily life.    Patient Session Goals / Objectives:  ? Described the psychological and physiological benefits to positive thinking  ? Practiced guided experiential exercise  ? Identified one way to integrate positive thinking into daily life  Telemedicine Visit: The patient's condition can be safely assessed and treated via synchronous audio and visual telemedicine encounter.      Reason for Telemedicine Visit: Services only offered telehealth    Originating Site (Patient Location): Patient's home    Distant Site (Provider Location): Provider Remote Setting    Consent:  The patient/guardian has verbally consented to: the potential risks and benefits of telemedicine (video visit) versus in person care; bill my insurance or make self-payment for services provided; and responsibility for payment of non-covered services.     Mode of Communication:  Video Conference via [employer]    As the provider I attest to compliance with applicable laws and regulations related to telemedicine.       Patient Participation / Response:  Fully participated with the group by sharing personal reflections / insights and openly  received / provided feedback with other participants.    Identified / Expressed personal readiness to practice skills    Treatment Plan:  Patient has a current master individualized treatment plan.  See Epic treatment plan for more information.    Alicia He RN

## 2021-02-01 ENCOUNTER — HOSPITAL ENCOUNTER (OUTPATIENT)
Dept: BEHAVIORAL HEALTH | Facility: CLINIC | Age: 56
End: 2021-02-01
Attending: PSYCHIATRY & NEUROLOGY
Payer: COMMERCIAL

## 2021-02-01 PROCEDURE — 90853 GROUP PSYCHOTHERAPY: CPT | Mod: GT

## 2021-02-01 PROCEDURE — G0177 OPPS/PHP; TRAIN & EDUC SERV: HCPCS | Mod: 95 | Performed by: OCCUPATIONAL THERAPIST

## 2021-02-01 NOTE — GROUP NOTE
Telemedicine Visit: The patient's condition can be safely assessed and treated via synchronous audio and visual telemedicine encounter.      Reason for Telemedicine Visit: Services only offered telehealth    Originating Site (Patient Location): Patient's home    Distant Site (Provider Location): Provider Remote Setting    Consent:  The patient/guardian has verbally consented to: the potential risks and benefits of telemedicine (video visit) versus in person care; bill my insurance or make self-payment for services provided; and responsibility for payment of non-covered services.     Mode of Communication:  Video Conference via [employer]    As the provider I attest to compliance with applicable laws and regulations related to telemedicine.  Psychotherapy Group Note    PATIENT'S NAME: Jammie Mariee  MRN:   [id number]  :   1965  ACCT. NUMBER: [id number]  DATE OF SERVICE: 21  START TIME: 11:00 AM  END TIME: 11:50 AM  FACILITATOR: Ermelinda Flynn LMFT  TOPIC:  EBP Group: Behavioral Activation  [facility] 55+ Program  TRACK: A1    NUMBER OF PARTICIPANTS: 3    Summary of Group / Topics Discussed:  Behavioral Activation: The Change Process - Behavior Change: Patients explored the process and types of change, including but not limited to, theories of change, steps to making change, methods of changing behavior, and potential barriers.  Patients worked to identify what changes may benefit their daily lives, and work towards a plan to implement change.      Patient Session Goals / Objectives:    Demonstrate understanding of the change process.      Identify positive and negative behavioral patterns.    Make plans to track and implement changes and share experiences in group.    Identify personal barriers to change      Patient Participation / Response:  Fully participated with the group by sharing personal reflections / insights and openly received / provided feedback with other  participants.    Demonstrated understanding of topics discussed through group discussion and participation, Expressed understanding of the relationship between behaviors, thoughts, and feelings, Identified barriers to change and Practiced skills in session    Treatment Plan:  Patient has a current master individualized treatment plan.  See Epic treatment plan for more information.    ARLENE Savage

## 2021-02-01 NOTE — GROUP NOTE
Psychoeducation Group Note    PATIENT'S NAME: Jammie Mariee  MRN:   [id number]  :   1965  ACCT. NUMBER: [id number]  DATE OF SERVICE: 21  START TIME: 10:00 AM  END TIME: 10:50 AM  FACILITATOR: Karthik Gordon OTR/L  TOPIC: MH Life Skills Group: Sensory Approaches in Mental Health  [facility] 55+ Program  TRACK: A1    NUMBER OF PARTICIPANTS: 3  Telemedicine Visit: The patient's condition can be safely assessed and treated via synchronous audio and visual telemedicine encounter.      Reason for Telemedicine Visit: Services only offered telehealth    Originating Site (Patient Location): Patient's home    Distant Site (Provider Location): [facility]: [facility]    Consent:  The patient/guardian has verbally consented to: the potential risks and benefits of telemedicine (video visit) versus in person care; bill my insurance or make self-payment for services provided; and responsibility for payment of non-covered services.     Mode of Communication:  Video Conference via Zoom    As the provider I attest to compliance with applicable laws and regulations related to telemedicine.      Summary of Group / Topics Discussed:  Sensory Approaches in Mental Health:  Self Regulation Through Sensory Modulation:  Patients were provided with education on Autonomic Nervous System activation and taught skills that can be used immediately to help them calm down and regain self control.  Patients were taught how to recognize specific signs and symptoms of their individualized state of arousal and how to make changes when needed.  Patients explored body based skills (bottom up processing skills) and techniques to help manage symptoms and change level of arousal when needed to be in control and comfortable so they are able to function in different environments.       Patient Session Goals / Objectives:    Identified specific and individualized neurosensory skills to help when distressed and for  crisis management    Identified signs and symptoms of current level of arousal and ways to make changes in level of arousal when needed    Established a plan for practice of these skills in their own environments        Patient Participation / Response:  Fully participated with the group by sharing personal reflections / insights and openly received / provided feedback with other participants.    Patient presentation: noted her fear and anxiety related to going out of her home when asked re: bothersome sensory input; did note some sensory modulation techniques that do help her calm herself, Verbalized understanding of content and Patient would benefit from additional opportunities to practice the content to be able to generalize it to their everyday life with increased intentionality, consistency, and efficacy in support of their psychiatric recovery    Treatment Plan:  Patient has a current master individualized treatment plan.  See Epic treatment plan for more information.    Karthik Gordon, OTR/L

## 2021-02-01 NOTE — GROUP NOTE
Telemedicine Visit: The patient's condition can be safely assessed and treated via synchronous audio and visual telemedicine encounter.      Reason for Telemedicine Visit: Services only offered telehealth    Originating Site (Patient Location): Patient's home    Distant Site (Provider Location): Provider Remote Setting    Consent:  The patient/guardian has verbally consented to: the potential risks and benefits of telemedicine (video visit) versus in person care; bill my insurance or make self-payment for services provided; and responsibility for payment of non-covered services.     Mode of Communication:  Video Conference via [employer]    As the provider I attest to compliance with applicable laws and regulations related to telemedicine.  Process Group Note    PATIENT'S NAME: Jammie Mariee  MRN:   [id number]  :   1965  ACCT. NUMBER: [id number]  DATE OF SERVICE: 21  START TIME:  9:00 AM  END TIME:  9:50 AM  FACILITATOR: Ermelinda Flynn LMFT  TOPIC:  Process Group    Diagnoses:  296.53 Bipolar I Disorder Current or Most Recent Episode Depressed, Severe    4. Other Diagnoses that is relevant to services:   300.00 (F41.9) Unspecified Anxiety Disorder       [facility] 55+ Program  TRACK: A1    NUMBER OF PARTICIPANTS: 3            Data:    Session content: At the start of this group, patients were invited to check in by identifying themselves, describing their current emotional status, and identifying issues to address in this group.   Area(s) of treatment focus addressed in this session included Symptom Management.  Processed enjoying the weekend with [place]. Reports she was able to take her time while packing and getting ready for their weekend trip by moving slowly and taking her time. Reports she is looking forward to her daughter spending the day with her. Identifies using deep breathing skills to manage anxiety. Identifies taking steps to look for support after her  "discharge and maintain structure to her day.    Therapeutic Interventions/Treatment Strategies:  Psychotherapist offered support, feedback and validation and reinforced use of skills. Treatment modalities used include Cognitive Behavioral Therapy. Interventions include Behavioral Activation: Encouraged strategies to reduce individual procrastination and increase motivation by increasing goal-directed activities to enhance mood and reduce symptoms. and Coping Skills: Discussed how the use of intentional \"in the moment\" actions can help reduce distress.    Assessment:    Patient response:   Patient responded to session by accepting feedback, giving feedback, listening and verbalizing understanding    Possible barriers to participation / learning include: and no barriers identified    Health Issues:   None reported       Substance Use Review:   Substance Use: No active concerns identified.    Mental Status/Behavioral Observations  Appearance:   Appropriate   Eye Contact:   Good   Psychomotor Behavior: Normal   Attitude:   Cooperative   Orientation:   All  Speech   Rate / Production: Normal    Volume:  Normal   Mood:    Anxious   Affect:    Worrisome   Thought Content:   Clear  Thought Form:  Coherent  Logical     Insight:    Fair     Plan:     Safety Plan: No current safety concerns identified.  Recommended that patient call 911 or go to the local ED should there be a change in any of these risk factors.     Barriers to treatment: None identified    Patient Contracts (see media tab):  None    Substance Use: Not addressed in session     Continue or Discharge: Patient will continue in 55+ Program (55+) as planned. Patient is likely to benefit from learning and using skills as they work toward the goals identified in their treatment plan.      ARLENE Savage  February 1, 2021  "

## 2021-02-02 ENCOUNTER — HOSPITAL ENCOUNTER (OUTPATIENT)
Dept: BEHAVIORAL HEALTH | Facility: CLINIC | Age: 56
End: 2021-02-02
Attending: FAMILY MEDICINE
Payer: COMMERCIAL

## 2021-02-02 PROCEDURE — G0177 OPPS/PHP; TRAIN & EDUC SERV: HCPCS | Mod: GT

## 2021-02-02 PROCEDURE — 90853 GROUP PSYCHOTHERAPY: CPT | Mod: 95

## 2021-02-02 NOTE — GROUP NOTE
Telemedicine Visit: The patient's condition can be safely assessed and treated via synchronous audio and visual telemedicine encounter.      Reason for Telemedicine Visit: Services only offered telehealth    Originating Site (Patient Location): Patient's home    Distant Site (Provider Location): Provider Remote Setting    Consent:  The patient/guardian has verbally consented to: the potential risks and benefits of telemedicine (video visit) versus in person care; bill my insurance or make self-payment for services provided; and responsibility for payment of non-covered services.     Mode of Communication:  Video Conference via [employer]    As the provider I attest to compliance with applicable laws and regulations related to telemedicine.  Process Group Note    PATIENT'S NAME: Jammie Mariee  MRN:   [id number]  :   1965  ACCT. NUMBER: [id number]  DATE OF SERVICE: 21  START TIME: 10:00 AM  END TIME: 10:50 AM  FACILITATOR: Ermelinda Flynn LMFT  TOPIC:  Process Group    Diagnoses:  296.53 Bipolar I Disorder Current or Most Recent Episode Depressed, Severe    4. Other Diagnoses that is relevant to services:   300.00 (F41.9) Unspecified Anxiety Disorder       [facility] 55+ Program  TRACK: A1    NUMBER OF PARTICIPANTS: 4          Data:    Session content: At the start of this group, patients were invited to check in by identifying themselves, describing their current emotional status, and identifying issues to address in this group.   Area(s) of treatment focus addressed in this session included Symptom Management.  Processed feeling more stable then the previous day reports she had positive quality time with her daughter the previous day and feeling accomplished from completing goals around her house. Explored ways to maintain structure inher day once she discharges from group. Reports she is looking into other support groups. No safety concerns.     Therapeutic Interventions/Treatment  "Strategies:  Psychotherapist offered support, feedback and validation and reinforced use of skills. Treatment modalities used include Cognitive Behavioral Therapy. Interventions include Coping Skills: Assisted patient in identifying 1-2 healthy distraction skills to reduce overall distress and Discussed how the use of intentional \"in the moment\" actions can help reduce distress.    Assessment:    Patient response:   Patient responded to session by accepting feedback, giving feedback, listening and verbalizing understanding    Possible barriers to participation / learning include: and no barriers identified    Health Issues:   None reported       Substance Use Review:   Substance Use: No active concerns identified.    Mental Status/Behavioral Observations  Appearance:   Appropriate   Eye Contact:   Good   Psychomotor Behavior: Normal   Attitude:   Cooperative   Orientation:   All  Speech   Rate / Production: Normal    Volume:  Normal   Mood:    Normal  Affect:    Worrisome   Thought Content:   Clear  Thought Form:  Coherent  Logical     Insight:    Fair     Plan:     Safety Plan: No current safety concerns identified.  Recommended that patient call 911 or go to the local ED should there be a change in any of these risk factors.     Barriers to treatment: None identified    Patient Contracts (see media tab):  None    Substance Use: Not addressed in session     Continue or Discharge: Patient will continue in 55+ Program (55+) as planned. Patient is likely to benefit from learning and using skills as they work toward the goals identified in their treatment plan.      ARLENE Savage  February 2, 2021  "

## 2021-02-02 NOTE — GROUP NOTE
Psychoeducation Group Note    PATIENT'S NAME: Jammie Mariee  MRN:   [id number]  :   1965  ACCT. NUMBER: [id number]  DATE OF SERVICE: 21  START TIME: 11:00 AM  END TIME: 11:50 AM  FACILITATOR: Alicia He RN  TOPIC: MH Life Skills Group: Resiliency Development  [facility] 55+ Program  TRACK: a1    NUMBER OF PARTICIPANTS: 4    Summary of Group / Topics Discussed:  Resiliency Development:  Coping Skills: Patients were taught how to identify stressors, signs of stress, coping skills, and prevention strategies for overall stress management.  Patients were given the opportunity to identify both ongoing and acute mental health symptoms and how to effectively manage these symptoms by developing an effective aftercare plan.  Patients increased awareness of community based resources.    Patient Session Goals / Objectives:    Identified how using coping skills can be used for symptom and stress management       Improved awareness of individualed symptoms and stressors and how to effectively cope     Established a relapse prevention plan to practice these skills in their own environments    Practiced and reflected on how to generalize taught skills to their everyday life    Telemedicine Visit: The patient's condition can be safely assessed and treated via synchronous audio and visual telemedicine encounter.      Reason for Telemedicine Visit: Services only offered telehealth    Originating Site (Patient Location): Patient's home    Distant Site (Provider Location): Provider Remote Setting    Consent:  The patient/guardian has verbally consented to: the potential risks and benefits of telemedicine (video visit) versus in person care; bill my insurance or make self-payment for services provided; and responsibility for payment of non-covered services.     Mode of Communication:  Video Conference via [employer]    As the provider I attest to compliance with applicable laws and regulations related to  telemedicine.       Patient Participation / Response:  Fully participated with the group by sharing personal reflections / insights and openly received / provided feedback with other participants.    Verbalized understanding of content    Treatment Plan:  Patient has a current master individualized treatment plan.  See Epic treatment plan for more information.    Alicia He RN

## 2021-02-02 NOTE — GROUP NOTE
Psychoeducation Group Note    PATIENT'S NAME: Jammie Mariee  MRN:   [id number]  :   1965  ACCT. NUMBER: [id number]  DATE OF SERVICE: 21  START TIME:  9:00 AM  END TIME:  9:50 AM  FACILITATOR: Alicia He RN  TOPIC: DELMAR RN Group: Mental Health Maintenance  [facility] 55+ Program  TRACK: a1    NUMBER OF PARTICIPANTS: 4    Summary of Group / Topics Discussed:  Mental Health Maintenance:  Stigma: In this group patients explored stigma surrounding a mental health diagnosis.  The group discussed the way stigma impacts their own life, and discussed strategies to reduce. The relationship between physical and mental health were also explored in the context of healthcare access, treatment, and support.    Patient Session Goals / Objectives:  ? Patients identified the importance of practicing emotional hygiene  ? Patients identified ways to decrease the  impact of stigma in their own life    Telemedicine Visit: The patient's condition can be safely assessed and treated via synchronous audio and visual telemedicine encounter.      Reason for Telemedicine Visit: Services only offered telehealth    Originating Site (Patient Location): Patient's home    Distant Site (Provider Location): Provider Remote Setting    Consent:  The patient/guardian has verbally consented to: the potential risks and benefits of telemedicine (video visit) versus in person care; bill my insurance or make self-payment for services provided; and responsibility for payment of non-covered services.     Mode of Communication:  Video Conference via [employer]    As the provider I attest to compliance with applicable laws and regulations related to telemedicine.       Patient Participation / Response:  Fully participated with the group by sharing personal reflections / insights and openly received / provided feedback with other participants.    Identified / Expressed personal readiness to practice skills    Treatment Plan:  Patient  has a current master individualized treatment plan.  See Epic treatment plan for more information.    Alicia He RN

## 2021-02-04 ENCOUNTER — HOSPITAL ENCOUNTER (OUTPATIENT)
Dept: BEHAVIORAL HEALTH | Facility: CLINIC | Age: 56
End: 2021-02-04
Attending: FAMILY MEDICINE
Payer: COMMERCIAL

## 2021-02-04 PROCEDURE — G0177 OPPS/PHP; TRAIN & EDUC SERV: HCPCS | Mod: GT | Performed by: OCCUPATIONAL THERAPIST

## 2021-02-04 PROCEDURE — 90853 GROUP PSYCHOTHERAPY: CPT | Mod: 95

## 2021-02-04 PROCEDURE — G0177 OPPS/PHP; TRAIN & EDUC SERV: HCPCS | Mod: 95

## 2021-02-04 NOTE — GROUP NOTE
Psychoeducation Group Note    PATIENT'S NAME: Jammie Mariee  MRN:   [id number]  :   1965  ACCT. NUMBER: [id number]  DATE OF SERVICE: 21  START TIME:  9:00 AM  END TIME:  9:50 AM  FACILITATOR: Alicia He RN  TOPIC: DELMAR RN Group: Mental Health Maintenance  [facility] 55+ Program  TRACK: a1    NUMBER OF PARTICIPANTS: 4    Summary of Group / Topics Discussed:  Mental Health Maintenance:  Assessments of Strengths: Patients completed a self-reflection on personal strengths worksheet. The concept of personal strength as it relates to resilience were explored. Patients shared responses with the group and participated in discussion.     Patient Session Goals / Objectives:  ? Patients identified 1-3 qualities they consider a personal strength.  ? Patients understood the concept of personal strengths and the connection it has to resiliency  Telemedicine Visit: The patient's condition can be safely assessed and treated via synchronous audio and visual telemedicine encounter.      Reason for Telemedicine Visit: Services only offered telehealth    Originating Site (Patient Location): Patient's home    Distant Site (Provider Location): Provider Remote Setting    Consent:  The patient/guardian has verbally consented to: the potential risks and benefits of telemedicine (video visit) versus in person care; bill my insurance or make self-payment for services provided; and responsibility for payment of non-covered services.     Mode of Communication:  Video Conference via [employer]    As the provider I attest to compliance with applicable laws and regulations related to telemedicine.       Patient Participation / Response:  Fully participated with the group by sharing personal reflections / insights and openly received / provided feedback with other participants.    Identified / Expressed personal readiness to practice skills    Treatment Plan:  Patient has a current master individualized treatment plan.   See Epic treatment plan for more information.    Alicia He RN

## 2021-02-04 NOTE — GROUP NOTE
Telemedicine Visit: The patient's condition can be safely assessed and treated via synchronous audio and visual telemedicine encounter.      Reason for Telemedicine Visit: Services only offered telehealth    Originating Site (Patient Location): Patient's home    Distant Site (Provider Location): Provider Remote Setting    Consent:  The patient/guardian has verbally consented to: the potential risks and benefits of telemedicine (video visit) versus in person care; bill my insurance or make self-payment for services provided; and responsibility for payment of non-covered services.     Mode of Communication:  Video Conference via [employer]    As the provider I attest to compliance with applicable laws and regulations related to telemedicine.  Process Group Note    PATIENT'S NAME: Jammie Mariee  MRN:   [id number]  :   1965  ACCT. NUMBER: [id number]  DATE OF SERVICE: 21  START TIME:  9:00 AM  END TIME:  9:50 AM  FACILITATOR: Ermelinda Flynn LMFT  TOPIC:  Process Group    Diagnoses:  296.53 Bipolar I Disorder Current or Most Recent Episode Depressed, Severe    4. Other Diagnoses that is relevant to services:   300.00 (F41.9) Unspecified Anxiety Disorder       [facility] 55+ Program  TRACK: A1     NUMBER OF PARTICIPANTS: 4          Data:    Session content: At the start of this group, patients were invited to check in by identifying themselves, describing their current emotional status, and identifying issues to address in this group.   Area(s) of treatment focus addressed in this session included Symptom Management.  Processed feelings around discharge. Reports she has been able to connect with her  to setup smoking cessation program. Reports she is going to spend the weekend with her  and looking forward to experience. Reports using skills to manage anxiety. No safety concerns    Therapeutic Interventions/Treatment Strategies:  Psychotherapist offered support,  feedback and validation and reinforced use of skills. Treatment modalities used include Cognitive Behavioral Therapy. Interventions include Coping Skills: Assisted patient in identifying 1-2 healthy distraction skills to reduce overall distress and Assisted patient in understanding the purpose of planning / creating / participating / sharing in positive experiences.    Assessment:    Patient response:   Patient responded to session by accepting feedback, giving feedback and verbalizing understanding    Possible barriers to participation / learning include: and no barriers identified    Health Issues:   None reported       Substance Use Review:   Substance Use: No active concerns identified.    Mental Status/Behavioral Observations  Appearance:   Appropriate   Eye Contact:   Good   Psychomotor Behavior: Normal   Attitude:   Cooperative   Orientation:   All  Speech   Rate / Production: Normal    Volume:  Normal   Mood:    Normal  Affect:    Worrisome   Thought Content:   Clear  Thought Form:  Coherent  Logical     Insight:    Fair     Plan:     Safety Plan: No current safety concerns identified.  Recommended that patient call 911 or go to the local ED should there be a change in any of these risk factors.     Barriers to treatment: None identified    Patient Contracts (see media tab):  None    Substance Use: Not addressed in session     Continue or Discharge: Patient is being discharged today. See Treatment Plan and Discharge Summary.       ARLENE Savage  February 4, 2021

## 2021-02-04 NOTE — GROUP NOTE
Psychoeducation Group Note    PATIENT'S NAME: Jammie Mariee  MRN:   [id number]  :   1965  ACCT. NUMBER: [id number]  DATE OF SERVICE: 21  START TIME: 10:00 AM  END TIME: 10:50 AM  FACILITATOR: Roseanne Valdez OTR/L  TOPIC: MH Life Skills Group: Lifestyle Balance and Structure  [facility] 55+ Program  TRACK: A1    NUMBER OF PARTICIPANTS: 4    Telemedicine Visit: The patient's condition can be safely assessed and treated via synchronous audio and visual telemedicine encounter.      Reason for Telemedicine Visit: Services only offered telehealth    Originating Site (Patient Location): Patient's home    Distant Site (Provider Location): [facility] [place] [address]    Consent:  The patient/guardian has verbally consented to: the potential risks and benefits of telemedicine (video visit) versus in person care; bill my insurance or make self-payment for services provided; and responsibility for payment of non-covered services.     Mode of Communication:  Video Conference via [employer]    As the provider I attest to compliance with applicable laws and regulations related to telemedicine.     Summary of Group / Topics Discussed:  Lifestyle Balance and Strucure:  Occupations: Patients were provided with an overview on the importance of daily occupations in support of mental health management.  Patients were assisted to identify an occupation that they needed to, wanted to, or had to complete and worked on this during the session in a supportive environment.  Patients then reported on progress made, barriers encountered and feelings as a result of this.  Psychoeducation was provided to help patients establish, restore, and/or modify performance skills and patterns for improved engagement and promotion of positive mental health through meaningful occupations.  Patients gained awareness of the connection between who they are (self identity) with what they do.    Patient Session  Goals / Objectives:    Increased awareness of how patient s functioning in identified meaningful occupations are impacted by their mental health status     Developed performance skills and performance patterns to enhance occupational engagement    Explored ways to generalize new awareness and skills to their everyday life        Patient Participation / Response:  Fully participated with the group by sharing personal reflections / insights and openly received / provided feedback with other participants.    Patient presentation: constricted affect; anxious mood; active in group discussion and experiential activity, Demonstrated understanding of content through identifying and engaging in an occupation of her choice during the session and reported success  and Verbalized understanding of content  No safety concerns reported. Able to identify some progress made while in the program.     Treatment Plan:  Patient has See Epic Treatment Plan - Patient is discharging.  See discharge instruction sheet/discharge summary for additional details.      Roseanne Valdez, OTR/L

## 2021-02-11 ENCOUNTER — HOSPITAL ENCOUNTER (OUTPATIENT)
Dept: BEHAVIORAL HEALTH | Facility: CLINIC | Age: 56
End: 2021-02-11
Attending: PSYCHIATRY & NEUROLOGY | Admitting: PSYCHIATRY & NEUROLOGY
Payer: COMMERCIAL

## 2021-02-11 ENCOUNTER — TELEPHONE (OUTPATIENT)
Dept: BEHAVIORAL HEALTH | Facility: CLINIC | Age: 56
End: 2021-02-11

## 2021-02-11 DIAGNOSIS — F31.9 BIPOLAR 1 DISORDER, DEPRESSED (H): ICD-10-CM

## 2021-02-11 PROCEDURE — 90853 GROUP PSYCHOTHERAPY: CPT | Mod: GT | Performed by: SOCIAL WORKER

## 2021-02-11 NOTE — PROGRESS NOTES
Acknowledgement of Current Treatment Plan       I have reviewed my treatment plan with my therapist, HELDER Hernández LICSW.   I agree with the plan as it is written in the electronic health record. (CL[facility]IC [facility])    Name:      Signature:  Jammie Mariee         Unable to sign related to COVID-19   Dr Johnie Grayson MD  Psychiatrist      HELDER Hernández LICSW Signed electronically. HELDER Hernández LICSW

## 2021-02-11 NOTE — GROUP NOTE
Telemedicine Visit: The patient's condition can be safely assessed and treated via synchronous audio and visual telemedicine encounter.      Reason for Telemedicine Visit: Patient has requested telehealth visit    Originating Site (Patient Location): Patient's home    Distant Site (Provider Location): [facility]    Consent:  The patient/guardian has verbally consented to: the potential risks and benefits of telemedicine (video visit) versus in person care; bill my insurance or make self-payment for services provided; and responsibility for payment of non-covered services.    Mode of Communication:  Video Conference via [employer]    As the provider I attest to compliance with applicable laws and regulations related to telemedicine.      Process Group Note    PATIENT'S NAME: Jammie Mariee  MRN:   [id number]  :   1965  ACCT. NUMBER: [id number]  DATE OF SERVICE: 21  START TIME:  1:00 PM  END TIME:  3:00 PM  FACILITATOR: Carmen Holman [facility]  TOPIC:  Process Group    Diagnoses:  296.53 Bipolar I Disorder Current or Most Recent Episode Depressed, Severe    300.00 (F41.9) Unspecified Anxiety Disorder      [address] Program  TRACK: [facility]    NUMBER OF PARTICIPANTS: 6          Data:    Session content: At the start of this group, patients were invited to check in by identifying themselves, describing their current emotional status, and identifying issues to address in this group.   Area(s) of treatment focus addressed in this session included Symptom Management, Personal Safety, Community Resources/Discharge Planning, Abstinence/Relapse Prevention, Develop / Improve Independent Living Skills and Develop Socialization / Interpersonal Relationship Skills.    Jammie began the + Aftercare Clinic today. She was able to self disclose in group the mood symptoms and life events leading to her admission. She reports  anxious mood. Denies S/I or safety issues. She had to leave her job in April due to a disability. She reports coping with COPD. She resides with her  and two dogs. She and her  enjoy ice fishing and just got a new ice castle. She plans on ice fishing this weekend.    In second Psychotherapy EBP Group, Jammie and peers in groups dicussed anxiety disorders, the physiology of anxiety and how it relates to their current symptoms as well as impact functioning level.  Patients received information regarding diagnoses, etiology, cultural, and environmental factors as well as impact on functioning. We discussed coping skills for symptom management to interrupt the  cycle of anxiety.     Therapeutic Interventions/Treatment Strategies:  Psychotherapist offered support, feedback and validation and reinforced use of skills. Treatment modalities used include Cognitive Behavioral Therapy.    Assessment:    Patient response:   Patient responded to session by accepting feedback, giving feedback, listening, focusing on goals and verbalizing understanding    Possible barriers to participation / learning include: COVID-19    Health Issues:   Yes: Chronic disease management, Associated Psychological Distress       Substance Use Review:   Substance Use: No active concerns identified.    Mental Status/Behavioral Observations  Appearance:   Appropriate   Eye Contact:   Good   Psychomotor Behavior: Normal   Attitude:   Cooperative  Interested Pleasant  Orientation:   All  Speech   Rate / Production: Normal    Volume:  Normal   Mood:    Anxious  Depressed   Affect:    Appropriate   Thought Content:   Clear and Safety denies any current safety concerns including suicidal ideation, self-harm, and homicidal ideation  Thought Form:  Coherent  Goal Directed  Logical     Insight:    Good     Plan:     Safety Plan: No current safety concerns identified.  Recommended that patient call 911 or go to the local ED should there be a  change in any of these risk factors.     Barriers to treatment: COVID-19    Patient Contracts (see media tab):  None    Substance Use: Not addressed in session     Continue or Discharge: Patient will continue in 55+ Program (55+) as planned. Patient is likely to benefit from learning and using skills as they work toward the goals identified in their treatment plan. Jammie will continue for mood stabilization and relapse prevention education for mental health management.      Carmen Holman, [facility] CareSW  February 11, 2021

## 2021-02-18 ENCOUNTER — HOSPITAL ENCOUNTER (OUTPATIENT)
Dept: BEHAVIORAL HEALTH | Facility: CLINIC | Age: 56
End: 2021-02-18
Attending: PSYCHIATRY & NEUROLOGY
Payer: COMMERCIAL

## 2021-02-18 PROCEDURE — 90853 GROUP PSYCHOTHERAPY: CPT | Mod: 95 | Performed by: SOCIAL WORKER

## 2021-02-18 NOTE — GROUP NOTE
Telemedicine Visit: The patient's condition can be safely assessed and treated via synchronous audio and visual telemedicine encounter.      Reason for Telemedicine Visit: Patient has requested telehealth visit    Originating Site (Patient Location): Patient's home    Distant Site (Provider Location): [facility]    Consent:  The patient/guardian has verbally consented to: the potential risks and benefits of telemedicine (video visit) versus in person care; bill my insurance or make self-payment for services provided; and responsibility for payment of non-covered services.    Mode of Communication:  Video Conference via [employer]    As the provider I attest to compliance with applicable laws and regulations related to telemedicine.    Process Group Note    PATIENT'S NAME: Jammie Mariee  MRN:   [id number]  :   1965  ACCT. NUMBER: [id number]  DATE OF SERVICE: 21  START TIME:  1:00 PM  END TIME:  3:00 PM  FACILITATOR: Carmen Holman LICSW  TOPIC:  Process Group    Diagnoses:  296.53 Bipolar I Disorder Current or Most Recent Episode Depressed, Severe    300.00 (F41.9) Unspecified Anxiety Disorder      [facility] 55+ Program  TRACK: Aftercare Clinic Group Two    NUMBER OF PARTICIPANTS: 6          Data:    Session content: At the start of this group, patients were invited to check in by identifying themselves, describing their current emotional status, and identifying issues to address in this group.   Area(s) of treatment focus addressed in this session included Symptom Management, Personal Safety, Community Resources/Discharge Planning, Abstinence/Relapse Prevention, Develop / Improve Independent Living Skills, Develop Socialization / Interpersonal Relationship Skills, Cultural / Spirituality and Physical Health .    Jammie reports anxious mood. Denies S/I or safety issues. She reported on going Ice fishing last weekend and caught a waleye fish. She has  been able to identify other activities at home to structure her time. She has been staying home related to managing physical health issues and to prevent COVID-19. She did consult with her MD and will work with [facility]. She discussed how her breathing issues impact her level of anxiety. Jammie hopes to have the support of her daughter this weekend since her  is out of town.        In Psychotherapy EBP, Jammie engaged in a group centered around the concept of the SACRED self. Each built self awareness into their level of  self compassion.   Patients identified  key concepts in developing self-compassion. We discussed mindfulness, self-kindness, and finding common humanity.  Patients identified ways they can put self-compassion skills into practice and problem solve barriers to application of skills.         Therapeutic Interventions/Treatment Strategies:  Psychotherapist offered support, feedback and validation and reinforced use of skills. Treatment modalities used include Cognitive Behavioral Therapy.    Assessment:    Patient response:   Patient responded to session by accepting feedback, listening, focusing on goals, being attentive, accepting support and verbalizing understanding    Possible barriers to participation / learning include: COVID-19    Health Issues:   Yes: Chronic disease management, Associated Psychological Distress       Substance Use Review:   Substance Use: No active concerns identified.    Mental Status/Behavioral Observations  Appearance:   Appropriate   Eye Contact:   Good   Psychomotor Behavior: Normal   Attitude:   Cooperative  Interested Pleasant  Orientation:   All  Speech   Rate / Production: Normal    Volume:  Normal   Mood:    Anxious   Affect:    Appropriate  Worrisome   Thought Content:   Clear and Safety denies any current safety concerns including suicidal ideation, self-harm, and homicidal ideation  Thought Form:  Coherent  Goal Directed  Logical      Insight:    Good     Plan:     Safety Plan: No current safety concerns identified.  Recommended that patient call 911 or go to the local ED should there be a change in any of these risk factors.     Barriers to treatment: COVID-19    Patient Contracts (see media tab):  None    Substance Use: Not addressed in session     Continue or Discharge: Patient will continue in 55+ Program (55+) as planned. Patient is likely to benefit from learning and using skills as they work toward the goals identified in their treatment plan. Jammie will continue for mood stabilization and relapse prevention education for mental health management.      Carmen Holman, [facility]  February 18, 2021

## 2021-02-25 ENCOUNTER — HOSPITAL ENCOUNTER (OUTPATIENT)
Dept: BEHAVIORAL HEALTH | Facility: CLINIC | Age: 56
End: 2021-02-25
Attending: PSYCHIATRY & NEUROLOGY
Payer: COMMERCIAL

## 2021-02-25 PROCEDURE — 90853 GROUP PSYCHOTHERAPY: CPT | Mod: 95 | Performed by: SOCIAL WORKER

## 2021-02-25 NOTE — GROUP NOTE
Telemedicine Visit: The patient's condition can be safely assessed and treated via synchronous audio and visual telemedicine encounter.      Reason for Telemedicine Visit: Patient has requested telehealth visit    Originating Site (Patient Location): Patient's home    Distant Site (Provider Location): [facility]    Consent:  The patient/guardian has verbally consented to: the potential risks and benefits of telemedicine (video visit) versus in person care; bill my insurance or make self-payment for services provided; and responsibility for payment of non-covered services.    Mode of Communication:  Video Conference via Zoom    As the provider I attest to compliance with applicable laws and regulations related to telemedicine.    Process Group Note    PATIENT'S NAME: Jammie Mariee  MRN:   [id number]  :   1965  ACCT. NUMBER: [id number]  DATE OF SERVICE: 21  START TIME:  1:00 PM  END TIME:  3:00 PM  FACILITATOR: Carmen Holman LICSW  TOPIC:  Process Group    Diagnoses:  296.53 Bipolar I Disorder Current or Most Recent Episode Depressed, Severe    300.00 (F41.9) Unspecified Anxiety Disorder      [facility] 55+ Program  TRACK: Aftercare Clinic Group Two     NUMBER OF PARTICIPANTS: 7          Data:    Session content: At the start of this group, patients were invited to check in by identifying themselves, describing their current emotional status, and identifying issues to address in this group.   Area(s) of treatment focus addressed in this session included Symptom Management, Personal Safety, Community Resources/Discharge Planning, Abstinence/Relapse Prevention, Develop / Improve Independent Living Skills, Develop Socialization / Interpersonal Relationship Skills and Physical Health .    Jammie reports anxious and depressed mood. Denies S/I or safety issues. She processed consulting with her Oncologist related to a spot found and is considering  radiation. She is going to consult with her  about the decision. Jammie reports low energy. She is using oxygen to assist with breathing and will work with Pulminary Rehab. Jammie is planning on Ice Fishing with her  over the weekend. She is taking her dogs.    In Psychotherapy EBP group, Jammie and peers in group discussed the topic of hope in order to help patients better understand the symptoms of hopelessness and how to become more hopeful. Patients discussed their current awareness of the topic and relevance to their functioning. Individual experiences with symptoms and treatment options were also discussed. Each set a plan to utilize skills to reduce hopelessness.           Therapeutic Interventions/Treatment Strategies:  Psychotherapist offered support, feedback and validation and reinforced use of skills. Treatment modalities used include Cognitive Behavioral Therapy.    Assessment:    Patient response:   Patient responded to session by accepting feedback, giving feedback, listening, focusing on goals, being attentive, accepting support and verbalizing understanding    Possible barriers to participation / learning include: COVID19    Health Issues:   Yes: Deciding if she will have radiation related to spot on lung. She is going to also work with Pulminary Rehab. SHe is using oxygen.       Substance Use Review:   Substance Use: No active concerns identified.    Mental Status/Behavioral Observations  Appearance:   Appropriate   Eye Contact:   Good   Psychomotor Behavior: Normal   Attitude:   Cooperative  Interested  Orientation:   All  Speech   Rate / Production: Normal    Volume:  Normal   Mood:    Anxious  Depressed   Affect:    Appropriate  Worrisome   Thought Content:   Clear and Safety denies any current safety concerns including suicidal ideation, self-harm, and homicidal ideation  Thought Form:  Coherent  Goal Directed  Logical     Insight:    Good     Plan:     Safety Plan: No  current safety concerns identified.  Recommended that patient call 911 or go to the local ED should there be a change in any of these risk factors.     Barriers to treatment: COVID19    Patient Contracts (see media tab):  None    Substance Use: Not addressed in session     Continue or Discharge: Patient will continue in 55+ Program (55+) as planned. Patient is likely to benefit from learning and using skills as they work toward the goals identified in their treatment plan. Jammie will continue for mood stabilization and symptom management education for mental health recovery.      Carmen Holman, [facility]  February 25, 2021

## 2021-02-26 NOTE — PROGRESS NOTES
Patient Active Problem List   Diagnosis     Bipolar 1 disorder, depressed (H)       Current Outpatient Medications:      divalproex sodium extended-release (DEPAKOTE ER) 250 MG 24 hr tablet, Take 1,000 mg by mouth, Disp: , Rfl:      hydrOXYzine (ATARAX) 25 MG tablet, Take 25 mg by mouth, Disp: , Rfl:      lamoTRIgine (LAMICTAL) 25 MG tablet, Take 1 tab daily for 6 days, then 2 tabs daily for 14 days, then 4 tabs daily for 10 days, Disp: , Rfl:      QUEtiapine (SEROQUEL) 50 MG tablet, Take 7.5 tablets at bedtime and 0.5 tablets daily., Disp: , Rfl:   Psychiatry staffing: case discussed  Diagnosis:    As above, anxiety present, medical issues.

## 2021-03-04 ENCOUNTER — HOSPITAL ENCOUNTER (OUTPATIENT)
Dept: BEHAVIORAL HEALTH | Facility: CLINIC | Age: 56
End: 2021-03-04
Attending: PSYCHIATRY & NEUROLOGY
Payer: COMMERCIAL

## 2021-03-04 PROCEDURE — 90853 GROUP PSYCHOTHERAPY: CPT | Mod: 95 | Performed by: SOCIAL WORKER

## 2021-03-04 NOTE — GROUP NOTE
Telemedicine Visit: The patient's condition can be safely assessed and treated via synchronous audio and visual telemedicine encounter.      Reason for Telemedicine Visit: Patient has requested telehealth visit    Originating Site (Patient Location): Patient's home    Distant Site (Provider Location): [facility]    Consent:  The patient/guardian has verbally consented to: the potential risks and benefits of telemedicine (video visit) versus in person care; bill my insurance or make self-payment for services provided; and responsibility for payment of non-covered services.    Mode of Communication:  Video Conference via Zoom    As the provider I attest to compliance with applicable laws and regulations related to telemedicine.    Process Group Note    PATIENT'S NAME: Jammie Mariee  MRN:   [id number]  :   1965  ACCT. NUMBER: [id number]  DATE OF SERVICE: 3/04/21  START TIME:  1:00 PM  END TIME:  3:00 PM  FACILITATOR: Carmen Holman LICSW  TOPIC:  Process Group    Diagnoses:  296.53 Bipolar I Disorder Current or Most Recent Episode Depressed, Severe    300.00 (F41.9) Unspecified Anxiety Disorder      [facility] 55+ Program  TRACK: 55+ Aftercare Clinic Group Two    NUMBER OF PARTICIPANTS: 6          Data:    Session content: At the start of this group, patients were invited to check in by identifying themselves, describing their current emotional status, and identifying issues to address in this group.   Area(s) of treatment focus addressed in this session included Symptom Management, Personal Safety, Community Resources/Discharge Planning, Abstinence/Relapse Prevention, Develop / Improve Independent Living Skills, Develop Socialization / Interpersonal Relationship Skills and Physical Health .    Jammie reports anxious mood with daily panic attacks and less depressed mood. Denies S/I or safety issues. She reported on the pattern of her mood symptoms. She  is aware of symptom management skills. Jammie will start Pulmonary Rehab which she is hopeful will assist with breathing issues. Jammie was able to find magen in ice fishing with her . They removed the ice castle from the lake. She enjoyed the drive despite it being many hours. She was able to visit family.      In Psychotherapy EBP group,  Jammie was able to engage in an insight oriented writing exercise by naming  her self awareness of gains made in her mental health recovery including strengths. Each named accomplishments and shared with peers in group.      Therapeutic Interventions/Treatment Strategies:  Psychotherapist offered support, feedback and validation and reinforced use of skills. Treatment modalities used include Cognitive Behavioral Therapy.    Assessment:    Patient response:   Patient responded to session by accepting feedback, listening, focusing on goals, being attentive and verbalizing understanding    Possible barriers to participation / learning include: COVID19    Health Issues:   Yes: Chronic disease management, Associated Psychological Distress       Substance Use Review:   Substance Use: No active concerns identified.    Mental Status/Behavioral Observations  Appearance:   Appropriate   Eye Contact:   Good   Psychomotor Behavior: Normal   Attitude:   Cooperative  Interested  Orientation:   All  Speech   Rate / Production: Normal    Volume:  Normal   Mood:    Anxious  Less depressed mood Reports daily panic attacks  Affect:    Appropriate  Worrisome   Thought Content:   Clear and Safety denies any current safety concerns including suicidal ideation, self-harm, and homicidal ideation  Thought Form:  Coherent  Goal Directed  Logical     Insight:    Good     Plan:     Safety Plan: No current safety concerns identified.  Recommended that patient call 911 or go to the local ED should there be a change in any of these risk factors.     Barriers to treatment: COVID19    Patient  Contracts (see media tab):  None    Substance Use: Not addressed in session     Continue or Discharge: Patient will continue in 55+ Program (55+) as planned. Patient is likely to benefit from learning and using skills as they work toward the goals identified in their treatment plan.  Jammie will continue for mood stabilization and symptom management education for mental health recovery.       aCrmen Holman, [facility]  March 4, 2021

## 2021-03-11 ENCOUNTER — HOSPITAL ENCOUNTER (OUTPATIENT)
Dept: BEHAVIORAL HEALTH | Facility: CLINIC | Age: 56
End: 2021-03-11
Attending: PSYCHIATRY & NEUROLOGY
Payer: COMMERCIAL

## 2021-03-11 PROCEDURE — 90853 GROUP PSYCHOTHERAPY: CPT | Mod: 95 | Performed by: SOCIAL WORKER

## 2021-03-11 NOTE — GROUP NOTE
Telemedicine Visit: The patient's condition can be safely assessed and treated via synchronous audio and visual telemedicine encounter.      Reason for Telemedicine Visit: Patient has requested telehealth visit    Originating Site (Patient Location): Patient's home    Distant Site (Provider Location): [facility]    Consent:  The patient/guardian has verbally consented to: the potential risks and benefits of telemedicine (video visit) versus in person care; bill my insurance or make self-payment for services provided; and responsibility for payment of non-covered services.    Mode of Communication:  Video Conference via Zoom    As the provider I attest to compliance with applicable laws and regulations related to telemedicine.    Process Group Note    PATIENT'S NAME: Jammie Mariee  MRN:   [id number]  :   1965  ACCT. NUMBER: [id number]  DATE OF SERVICE: 3/11/21  START TIME:  1:00 PM  END TIME:  3:00 PM  FACILITATOR: Carmen Holman LICSW  TOPIC:  Process Group    Diagnoses:  296.53 Bipolar I Disorder Current or Most Recent Episode Depressed, Severe    4. Other Diagnoses that is relevant to services:   300.00 (F41.9) Unspecified Anxiety Disorder      [facility] 55+ Program  TRACK: 55+ Aftercare Clinic Group Two     NUMBER OF PARTICIPANTS: 6          Data:    Session content: At the start of this group, patients were invited to check in by identifying themselves, describing their current emotional status, and identifying issues to address in this group.   Area(s) of treatment focus addressed in this session included Symptom Management, Personal Safety, Community Resources/Discharge Planning, Abstinence/Relapse Prevention, Develop / Improve Independent Living Skills, Develop Socialization / Interpersonal Relationship Skills and Physical Health.    Jammie reports depressed mood,  anxious mood with panic attacks. Denies S/I or safety issues. She started  puliminary rehab today and will have visits x2 week for two hours. Jammie is hopeful that this will improve her breathing and decrease anxiety. She is using coping skills for symptom management and her support system. Her  will attend the virtual visit with her psychiatrist today for medication management.    In Psychotherapy EBP, Jammie and peers learned to apply self-soothe as a way to decrease heightened stress in the moment.  They focused on ways to manage physical symptoms of distress using the senses. We discussed the physiology of the autonomic nervous system and what happens in the body when using self soothing strategies. Mindfulness as a wellness practice was also discussed.         Therapeutic Interventions/Treatment Strategies:  Psychotherapist offered support, feedback and validation and reinforced use of skills. Treatment modalities used include Cognitive Behavioral Therapy.    Assessment:    Patient response:   Patient responded to session by accepting feedback, giving feedback, listening, focusing on goals, being attentive and verbalizing understanding    Possible barriers to participation / learning include: COVID19    Health Issues:   Yes: Chronic disease management, Associated Psychological Distress       Substance Use Review:   Substance Use: No active concerns identified.    Mental Status/Behavioral Observations  Appearance:   Appropriate   Eye Contact:   Good   Psychomotor Behavior: Normal   Attitude:   Cooperative  Interested  Orientation:   All  Speech   Rate / Production: Normal    Volume:  Normal   Mood:    Anxious  Depressed   Affect:    Appropriate   Thought Content:   Clear and Safety denies any current safety concerns including suicidal ideation, self-harm, and homicidal ideation  Thought Form:  Coherent  Goal Directed  Logical     Insight:    Good     Plan:     Safety Plan: No current safety concerns identified.  Recommended that patient call 911 or go to the local ED should  there be a change in any of these risk factors.     Barriers to treatment: COVID19    Patient Contracts (see media tab):  None    Substance Use: Not addressed in session     Continue or Discharge: Patient will continue in 55+ Program (55+) as planned. Patient is likely to benefit from learning and using skills as they work toward the goals identified in their treatment plan.  Jammie will continue for mood stabilization and symptom management education for mental health recovery.      Carmen Holman, [facility]  March 11, 2021

## 2021-03-18 ENCOUNTER — HOSPITAL ENCOUNTER (OUTPATIENT)
Dept: BEHAVIORAL HEALTH | Facility: CLINIC | Age: 56
End: 2021-03-18
Attending: PSYCHIATRY & NEUROLOGY
Payer: COMMERCIAL

## 2021-03-18 PROCEDURE — 90853 GROUP PSYCHOTHERAPY: CPT | Mod: GT | Performed by: SOCIAL WORKER

## 2021-03-18 NOTE — GROUP NOTE
Telemedicine Visit: The patient's condition can be safely assessed and treated via synchronous audio and visual telemedicine encounter.      Reason for Telemedicine Visit: Patient has requested telehealth visit    Originating Site (Patient Location): Patient's home    Distant Site (Provider Location): [facility]    Consent:  The patient/guardian has verbally consented to: the potential risks and benefits of telemedicine (video visit) versus in person care; bill my insurance or make self-payment for services provided; and responsibility for payment of non-covered services.    Mode of Communication:  Video Conference via Zoom    As the provider I attest to compliance with applicable laws and regulations related to telemedicine.    Process Group Note    PATIENT'S NAME: Jammie Mariee  MRN:   [id number]  :   1965  ACCT. NUMBER: [id number]  DATE OF SERVICE: 3/18/21  START TIME:  1:00 PM  END TIME:  1:50 PM  FACILITATOR: Carmen Holman LICSW  TOPIC:  Process Group    Diagnoses:  296.53 Bipolar I Disorder Current or Most Recent Episode Depressed, Severe    4. Other Diagnoses that is relevant to services:   300.00 (F41.9) Unspecified Anxiety Disorder      [facility] 55+ Program  TRACK: 55+ Aftercare Clinic Group Two    NUMBER OF PARTICIPANTS: 6          Data:    Session content: At the start of this group, patients were invited to check in by identifying themselves, describing their current emotional status, and identifying issues to address in this group.   Area(s) of treatment focus addressed in this session included Symptom Management, Personal Safety, Community Resources/Discharge Planning, Abstinence/Relapse Prevention, Develop / Improve Independent Living Skills and Physical Health .    In Psychotherapy Group, Jammie reports anxious and depressed mood. Denies S/I or safety issues. She reports obtaining a new medication to manage her anxiety from her  psychiatrist last week.  Jammie reports anhedonia. She was able to enjoy visits by her daughter. Jammie is continuing to attend Pulmonary Rehab x2 per week which she is finding helpful. She identified activities to structure her time for routine.    In Psychotherpy EBP Group, Jammie and peers discussed the topic: Coping Skills: Building Positive Experiences. Patients discussed the importance of planning and engaging in positive experiences, as strategies to increase positive thinking, hope, and self-worth.  Explored the benefits of planning / creating positive experiences, including recognizing and reducing negativity bias by focusing on and building positive experiences.   Several approaches to building positive experiences were presented. We discussed the barriers. Each set goals to increase a variety of positive experiences.       Therapeutic Interventions/Treatment Strategies:  Psychotherapist offered support, feedback and validation and reinforced use of skills. Treatment modalities used include Cognitive Behavioral Therapy.    Assessment:    Patient response:   Patient responded to session by accepting feedback, giving feedback, listening, focusing on goals and verbalizing understanding    Possible barriers to participation / learning include: COVID19    Health Issues:   Yes: Chronic disease management, Associated Psychological Distress  Continues Pulminary Rehab x2 days per week       Substance Use Review:   Substance Use: No active concerns identified.    Mental Status/Behavioral Observations  Appearance:   Appropriate   Eye Contact:   Good   Psychomotor Behavior: Normal   Attitude:   Cooperative  Interested Pleasant  Orientation:   All  Speech   Rate / Production: Normal    Volume:  Normal   Mood:    Anxious  Depressed   Affect:    Appropriate   Thought Content:   Clear and Safety denies any current safety concerns including suicidal ideation, self-harm, and homicidal ideation  Thought Form:  Coherent   Goal Directed  Logical     Insight:    Good     Plan:     Safety Plan: No current safety concerns identified.  Recommended that patient call 911 or go to the local ED should there be a change in any of these risk factors.     Barriers to treatment: COVID19    Patient Contracts (see media tab):  None    Substance Use: Not addressed in session     Continue or Discharge: Patient will continue in 55+ Program (55+) as planned. Patient is likely to benefit from learning and using skills as they work toward the goals identified in their treatment plan.      Carmen Holman, [facility]  March 18, 2021

## 2021-03-25 ENCOUNTER — HOSPITAL ENCOUNTER (OUTPATIENT)
Dept: BEHAVIORAL HEALTH | Facility: CLINIC | Age: 56
End: 2021-03-25
Attending: PSYCHIATRY & NEUROLOGY
Payer: COMMERCIAL

## 2021-03-25 PROCEDURE — 90853 GROUP PSYCHOTHERAPY: CPT | Mod: GT | Performed by: SOCIAL WORKER

## 2021-03-25 NOTE — GROUP NOTE
Telemedicine Visit: The patient's condition can be safely assessed and treated via synchronous audio and visual telemedicine encounter.      Reason for Telemedicine Visit: Patient has requested telehealth visit    Originating Site (Patient Location): Patient's home    Distant Site (Provider Location): [facility]    Consent:  The patient/guardian has verbally consented to: the potential risks and benefits of telemedicine (video visit) versus in person care; bill my insurance or make self-payment for services provided; and responsibility for payment of non-covered services.    Mode of Communication:  Video Conference via Zoom    As the provider I attest to compliance with applicable laws and regulations related to telemedicine.    Process Group Note    PATIENT'S NAME: Jammie Mariee  MRN:   [id number]  :   1965  ACCT. NUMBER: [id number]  DATE OF SERVICE: 3/25/21  START TIME:  1:00 PM  END TIME:  3:00 PM  FACILITATOR: Carmen Holman LICSW  TOPIC:  Process Group    Diagnoses:  296.53 Bipolar I Disorder Current or Most Recent Episode Depressed, Severe     300.00 (F41.9) Unspecified Anxiety Disorder      [facility] 55+ Program  TRACK: 55+ Aftercare Clinic Group two    NUMBER OF PARTICIPANTS: 8          Data:    Session content: At the start of this group, patients were invited to check in by identifying themselves, describing their current emotional status, and identifying issues to address in this group.   Area(s) of treatment focus addressed in this session included Symptom Management, Personal Safety, Community Resources/Discharge Planning, Abstinence/Relapse Prevention, Develop / Improve Independent Living Skills, Develop Socialization / Interpersonal Relationship Skills and Physical Health .    Jammie reports depressed, anxious mood with panic attacks. She processed the pattern of mood symptoms. She also processed co-morbid physical health issues  including difficulty breathing. She continues pulmonary rehabilitation twice a week. She and her  were able to enjoy visiting with friends. She continues to establish a routine including walking her dogs. Jammie reports stress when her water heater broke and she had to take a cold shower. They are awaiting parts for the repair. Jammie is using coping skills for symptom management.    In Psychotherapy EBP Group, Jammie and peers in group discussed the biology of anxiety and panic. Emotional triggers were explored as well as coping skills for symptom management. We also identified the roles of support  in a relapse prevention plan.      Therapeutic Interventions/Treatment Strategies:  Psychotherapist offered support, feedback and validation and reinforced use of skills. Treatment modalities used include Cognitive Behavioral Therapy.    Assessment:    Patient response:   Patient responded to session by accepting feedback, giving feedback, listening, focusing on goals and verbalizing understanding    Possible barriers to participation / learning include: COVID19    Health Issues:   Yes: Chronic disease management, Associated Psychological Distress. Attends Pulminory Rehabilitation twice weekly. She continues to use oxygen.       Substance Use Review:   Substance Use: No active concerns identified.    Mental Status/Behavioral Observations  Appearance:   Appropriate   Eye Contact:   Good   Psychomotor Behavior: Normal   Attitude:   Cooperative  Interested  Orientation:   All  Speech   Rate / Production: Normal    Volume:  Normal   Mood:    Anxious  Depressed  Panicked  Affect:    Appropriate  Worrisome   Thought Content:   Clear and Safety denies any current safety concerns including suicidal ideation, self-harm, and homicidal ideation  Thought Form:  Coherent  Goal Directed  Logical     Insight:    Good     Plan:     Safety Plan: No current safety concerns identified.  Recommended that patient call 911 or go  to the local ED should there be a change in any of these risk factors.     Barriers to treatment: COVID19    Patient Contracts (see media tab):  None    Substance Use: Not addressed in session     Continue or Discharge: Patient will continue in 55+ Program (55+) as planned. Patient is likely to benefit from learning and using skills as they work toward the goals identified in their treatment plan.  Jammie will continue for mood stabilization and relapse prevention education for mental health maintenance.       Carmen Holman, [facility]  March 25, 2021

## 2021-03-31 NOTE — ADDENDUM NOTE
Encounter addended by: Johnie Grayson MD on: 3/31/2021 8:18 AM   Actions taken: Clinical Note Signed

## 2021-03-31 NOTE — PROGRESS NOTES
Patient Active Problem List   Diagnosis     Bipolar 1 disorder, depressed (H)       Current Outpatient Medications:      divalproex sodium extended-release (DEPAKOTE ER) 250 MG 24 hr tablet, Take 1,000 mg by mouth, Disp: , Rfl:      hydrOXYzine (ATARAX) 25 MG tablet, Take 25 mg by mouth, Disp: , Rfl:      lamoTRIgine (LAMICTAL) 25 MG tablet, Take 1 tab daily for 6 days, then 2 tabs daily for 14 days, then 4 tabs daily for 10 days, Disp: , Rfl:      QUEtiapine (SEROQUEL) 50 MG tablet, Take 7.5 tablets at bedtime and 0.5 tablets daily., Disp: , Rfl:   'Psychiatry staffing: case discussed  Diagnosis:    As above, plus anxiety.  Breathing difficulties worsen anxiety.

## 2021-04-01 ENCOUNTER — HOSPITAL ENCOUNTER (OUTPATIENT)
Dept: BEHAVIORAL HEALTH | Facility: CLINIC | Age: 56
End: 2021-04-01
Attending: PSYCHIATRY & NEUROLOGY
Payer: COMMERCIAL

## 2021-04-08 ENCOUNTER — HOSPITAL ENCOUNTER (OUTPATIENT)
Dept: BEHAVIORAL HEALTH | Facility: CLINIC | Age: 56
End: 2021-04-08
Attending: PSYCHIATRY & NEUROLOGY
Payer: COMMERCIAL

## 2021-04-08 PROCEDURE — 90853 GROUP PSYCHOTHERAPY: CPT | Mod: GT | Performed by: SOCIAL WORKER

## 2021-04-08 NOTE — GROUP NOTE
Telemedicine Visit: The patient's condition can be safely assessed and treated via synchronous audio and visual telemedicine encounter.      Reason for Telemedicine Visit: Patient has requested telehealth visit    Originating Site (Patient Location): Patient's home    Distant Site (Provider Location): [facility]    Consent:  The patient/guardian has verbally consented to: the potential risks and benefits of telemedicine (video visit) versus in person care; bill my insurance or make self-payment for services provided; and responsibility for payment of non-covered services.    Mode of Communication:  Video Conference via Zoom    As the provider I attest to compliance with applicable laws and regulations related to telemedicine.    Process Group Note    PATIENT'S NAME: Jammie Mariee  MRN:   [id number]  :   1965  ACCT. NUMBER: [id number]  DATE OF SERVICE: 21  START TIME:  1:00 PM  END TIME:  3:00 PM  FACILITATOR: Carmen Holman LICSW  TOPIC:  Process Group    Diagnoses:  296.53 Bipolar I Disorder Current or Most Recent Episode Depressed, Severe    300.00 (F41.9) Unspecified Anxiety Disorder      [facility] 55+ Program  TRACK: 55+ Aftercare Clinic Group Two    NUMBER OF PARTICIPANTS: 7          Data:    Session content: At the start of this group, patients were invited to check in by identifying themselves, describing their current emotional status, and identifying issues to address in this group.   Area(s) of treatment focus addressed in this session included Symptom Management, Personal Safety, Community Resources/Discharge Planning, Abstinence/Relapse Prevention, Develop / Improve Independent Living Skills and Develop Socialization / Interpersonal Relationship Skills.    Jammie reports depressed and anxious mood. Denies S/I or safety issues. She processed the pattern of mood symptoms with co-morbid physical health issues. She continues attending  Pulminary Rehabilitations x2 per week and will be attending for another month. Jammie is using coping skills for symptom management. She would like to walk outside more and we problem solved mobility issues.    In Psychotherapy Group Two, Jammie      Therapeutic Interventions/Treatment Strategies:  Psychotherapist offered support, feedback and validation and reinforced use of skills. Treatment modalities used include Cognitive Behavioral Therapy.    Assessment:    Patient response:   Patient responded to session by accepting feedback, giving feedback, listening, focusing on goals and verbalizing understanding    Possible barriers to participation / learning include: COVID19    Health Issues:   Yes: Chronic disease management, Associated Psychological Distress       Substance Use Review:   Substance Use: No active concerns identified.    Mental Status/Behavioral Observations  Appearance:   Appropriate   Eye Contact:   Good   Psychomotor Behavior: Normal   Attitude:   Cooperative  Interested  Orientation:   All  Speech   Rate / Production: Normal    Volume:  Normal   Mood:    Anxious  Depressed   Affect:    Appropriate  Worrisome   Thought Content:   Clear and Safety denies any current safety concerns including suicidal ideation, self-harm, and homicidal ideation  Thought Form:  Coherent  Goal Directed  Logical     Insight:    Good     Plan:     Safety Plan: No current safety concerns identified.  Recommended that patient call 911 or go to the local ED should there be a change in any of these risk factors.     Barriers to treatment: COVID19    Patient Contracts (see media tab):  None    Substance Use: Not addressed in session     Continue or Discharge: Patient will continue in 55+ Program (55+) as planned. Patient is likely to benefit from learning and using skills as they work toward the goals identified in their treatment plan. Jammie will continue for moo stabilization and relapse prevention education for  mental health recovery.      Carmen Holman, [facility]  April 8, 2021

## 2021-04-15 ENCOUNTER — HOSPITAL ENCOUNTER (OUTPATIENT)
Dept: BEHAVIORAL HEALTH | Facility: CLINIC | Age: 56
End: 2021-04-15
Attending: PSYCHIATRY & NEUROLOGY
Payer: COMMERCIAL

## 2021-04-15 PROCEDURE — 90853 GROUP PSYCHOTHERAPY: CPT | Mod: GT | Performed by: COUNSELOR

## 2021-04-15 NOTE — GROUP NOTE
Process Group Note    PATIENT'S NAME: Jammie Mariee  MRN:   [id number]  :   1965  ACCT. NUMBER: [id number]  DATE OF SERVICE: 4/15/21  START TIME:  1:00 PM  END TIME:  2:50 PM  FACILITATOR: Divya Bautista LPCC  TOPIC:  Process Group    Diagnoses:  296.53 Bipolar I Disorder Current or Most Recent Episode Depressed, Severe    300.00 (F41.9) Unspecified Anxiety Disorder    [facility] 55+ Program  TRACK: Clinic 2    NUMBER OF PARTICIPANTS: 5    Telemedicine Visit: The patient's condition can be safely assessed and treated via synchronous audio and visual telemedicine encounter.      Reason for Telemedicine Visit: Services only offered telehealth and due to COVID-19    Originating Site (Patient Location): Patient's home    Distant Site (Provider Location): Provider Remote Setting    Consent:  The patient/guardian has verbally consented to: the potential risks and benefits of telemedicine (video visit) versus in person care; bill my insurance or make self-payment for services provided; and responsibility for payment of non-covered services.     Mode of Communication:  Video Conference via Zoom    As the provider I attest to compliance with applicable laws and regulations related to telemedicine.            Data:    Session content: At the start of this group, patients were invited to check in by identifying themselves, describing their current emotional status, and identifying issues to address in this group.   Area(s) of treatment focus addressed in this session included Symptom Management, Personal Safety and Community Resources/Discharge Planning.    Patient reported feeling tired after pulmonary rehabilitation this morning. Patient expressed she would like to create a routine and schedule. Patient reported she would utilize petting her two dogs and scents to practice the self-soothe skill.     Therapeutic Interventions/Treatment Strategies:  Psychotherapist offered support, feedback and  validation and reinforced use of skills. Treatment modalities used include Motivational Interviewing and Cognitive Behavioral Therapy. Interventions include Coping Skills: Assisted patient in identifying 1-2 healthy distraction skills to reduce overall distress, Symptoms Management: Promoted understanding of their diagnoses and how it impacts their functioning and Emotions Management:  Discussed barriers to emotional regulation.    Assessment:    Patient response:   Patient responded to session by accepting feedback, giving feedback, listening, focusing on goals, being attentive and accepting support    Possible barriers to participation / learning include: and no barriers identified    Health Issues:   Yes: resperatory issues, Associated Psychological Distress       Substance Use Review:   Substance Use: No active concerns identified.    Mental Status/Behavioral Observations  Appearance:   Appropriate   Eye Contact:   Good   Psychomotor Behavior: Normal   Attitude:   Cooperative   Orientation:   All  Speech   Rate / Production: Normal/ Responsive Normal    Volume:  Normal   Mood:    Anxious  Depressed  Normal  Affect:    Appropriate   Thought Content:   Clear and Safety denies any current safety concerns including suicidal ideation, self-harm, and homicidal ideation  Thought Form:  Coherent  Logical     Insight:    Good     Plan:     Safety Plan: No current safety concerns identified.  Recommended that patient call 911 or go to the local ED should there be a change in any of these risk factors.     Barriers to treatment: None identified    Patient Contracts (see media tab):  None    Substance Use: Provided encouragement towards sobriety     Continue or Discharge: Patient will continue in 55+ Program (55+) as planned. Patient is likely to benefit from learning and using skills as they work toward the goals identified in their treatment plan.      Divya Bautista, [facility]  April 15, 2021

## 2021-04-15 NOTE — ADDENDUM NOTE
Encounter addended by: Divya Bautista [facility] on: 4/15/2021 3:14 PM   Actions taken: Charge Capture section accepted

## 2021-04-22 ENCOUNTER — HOSPITAL ENCOUNTER (OUTPATIENT)
Dept: BEHAVIORAL HEALTH | Facility: CLINIC | Age: 56
End: 2021-04-22
Attending: PSYCHIATRY & NEUROLOGY
Payer: COMMERCIAL

## 2021-04-22 PROCEDURE — 90853 GROUP PSYCHOTHERAPY: CPT | Mod: 95 | Performed by: SOCIAL WORKER

## 2021-04-22 NOTE — GROUP NOTE
Telemedicine Visit: The patient's condition can be safely assessed and treated via synchronous audio and visual telemedicine encounter.      Reason for Telemedicine Visit: Patient has requested telehealth visit    Originating Site (Patient Location): Patient's home    Distant Site (Provider Location): [facility]    Consent:  The patient/guardian has verbally consented to: the potential risks and benefits of telemedicine (video visit) versus in person care; bill my insurance or make self-payment for services provided; and responsibility for payment of non-covered services.    Mode of Communication:  Video Conference via Zoom    As the provider I attest to compliance with applicable laws and regulations related to telemedicine.    Process Group Note    PATIENT'S NAME: Jammie Mariee  MRN:   [id number]  :   1965  ACCT. NUMBER: [id number]  DATE OF SERVICE: 21  START TIME:  1:00 PM  END TIME:  3:00 PM  FACILITATOR: Carmen Holman LICSW  TOPIC:  Process Group    Diagnoses:  296.53 Bipolar I Disorder Current or Most Recent Episode Depressed, Severe    300.00 (F41.9) Unspecified Anxiety Disorder      [facility] 55+ Program  TRACK: 55+ Aftercare CLinic Group Two    NUMBER OF PARTICIPANTS: 6          Data:    Session content: At the start of this group, patients were invited to check in by identifying themselves, describing their current emotional status, and identifying issues to address in this group.   Area(s) of treatment focus addressed in this session included Symptom Management, Personal Safety, Community Resources/Discharge Planning, Abstinence/Relapse Prevention, Develop / Improve Independent Living Skills, Develop Socialization / Interpersonal Relationship Skills and Physical Health .    Jammie reports less depressed and less anxious mood. She continues to have panic attacks at times.  Jammie processed co-morbid physical health symptoms. She  continues with pulmonary rehab twice a week. She will also join their gym for strength training. Jammie is aware of coping skills for symptom management. Her  is supportive.     In Psychotherpy EBP, Jammie and peers in group discussed Sensory Approaches in Mental Health:  Self Regulation Through Sensory Modulation.  Patients were provided with education on Autonomic Nervous System activation and taught skills that can be used immediately to help them calm down and regain self control.  Patients were taught how to recognize specific signs and symptoms of their individualized state of arousal and how to make changes when needed.  Patients explored body based skills  and techniques to help manage symptoms and change level of arousal when needed to be in control and comfortable so they are able to function in different environments.      Therapeutic Interventions/Treatment Strategies:  Psychotherapist offered support, feedback and validation and reinforced use of skills. Treatment modalities used include Cognitive Behavioral Therapy.    Assessment:    Patient response:   Patient responded to session by accepting feedback, giving feedback, listening, focusing on goals, being attentive, accepting support and verbalizing understanding    Possible barriers to participation / learning include: COVID19    Health Issues:   Yes: Chronic disease management, Associated Psychological Distress       Substance Use Review:   Substance Use: No active concerns identified.    Mental Status/Behavioral Observations  Appearance:   Appropriate   Eye Contact:   Good   Psychomotor Behavior: Normal   Attitude:   Cooperative  Interested  Orientation:   All  Speech   Rate / Production: Normal    Volume:  Normal   Mood:    Less depressed and less anxious mood. Conitnues to have panic attacks at times.  Affect:    Appropriate   Thought Content:   Clear and Safety denies any current safety concerns including suicidal ideation, self-harm,  and homicidal ideation  Thought Form:  Coherent  Goal Directed  Logical     Insight:    Good     Plan:     Safety Plan: No current safety concerns identified.  Recommended that patient call 911 or go to the local ED should there be a change in any of these risk factors.     Barriers to treatment: COVID19    Patient Contracts (see media tab):  None    Substance Use: Not addressed in session     Continue or Discharge: Patient will continue in 55+ Program (55+) as planned. Patient is likely to benefit from learning and using skills as they work toward the goals identified in their treatment plan. Flash galo for mood stabilization and symptom management education for mental health recovery.        Carmen Holman, [facility]  April 23, 2021

## 2021-04-28 NOTE — PROGRESS NOTES
Patient Active Problem List   Diagnosis     Bipolar 1 disorder, depressed (H)       Current Outpatient Medications:      divalproex sodium extended-release (DEPAKOTE ER) 250 MG 24 hr tablet, Take 1,000 mg by mouth, Disp: , Rfl:      hydrOXYzine (ATARAX) 25 MG tablet, Take 25 mg by mouth, Disp: , Rfl:      lamoTRIgine (LAMICTAL) 25 MG tablet, Take 1 tab daily for 6 days, then 2 tabs daily for 14 days, then 4 tabs daily for 10 days, Disp: , Rfl:      QUEtiapine (SEROQUEL) 50 MG tablet, Take 7.5 tablets at bedtime and 0.5 tablets daily., Disp: , Rfl:   Psychiatry staffing: case discussed  Diagnosis:  As above;  Attends pulmonary rehab also, mood stabilizing

## 2021-04-29 ENCOUNTER — HOSPITAL ENCOUNTER (OUTPATIENT)
Dept: BEHAVIORAL HEALTH | Facility: CLINIC | Age: 56
End: 2021-04-29
Attending: PSYCHIATRY & NEUROLOGY
Payer: COMMERCIAL

## 2021-04-29 PROCEDURE — 90853 GROUP PSYCHOTHERAPY: CPT | Mod: 95 | Performed by: SOCIAL WORKER

## 2021-04-29 NOTE — GROUP NOTE
Telemedicine Visit: The patient's condition can be safely assessed and treated via synchronous audio and visual telemedicine encounter.      Reason for Telemedicine Visit: Patient has requested telehealth visit    Originating Site (Patient Location): Patient's home    Distant Site (Provider Location): remote setting    Consent:  The patient/guardian has verbally consented to: the potential risks and benefits of telemedicine (video visit) versus in person care; bill my insurance or make self-payment for services provided; and responsibility for payment of non-covered services.    Mode of Communication:  Video Conference via Zoom    As the provider I attest to compliance with applicable laws and regulations related to telemedicine.    Process Group Note    PATIENT'S NAME: Jammie Mariee  MRN:   [id number]  :   1965  ACCT. NUMBER: [id number]  DATE OF SERVICE: 21  START TIME:  1:00 PM  END TIME:  3:00 PM  FACILITATOR: Carmen Holman LICSW  TOPIC:  Process Group    Diagnoses:  296.53 Bipolar I Disorder Current or Most Recent Episode Depressed, Severe      300.00 (F41.9) Unspecified Anxiety Disorder      [facility] 55+ Program  TRACK: 55+ Aftercare CLinic Group Two    NUMBER OF PARTICIPANTS: 4          Data:    Session content: At the start of this group, patients were invited to check in by identifying themselves, describing their current emotional status, and identifying issues to address in this group.   Area(s) of treatment focus addressed in this session included Symptom Management, Personal Safety, Community Resources/Discharge Planning, Abstinence/Relapse Prevention, Develop / Improve Independent Living Skills and Develop Socialization / Interpersonal Relationship Skills.    Jammie reports less depressed and less anxious mood. Denies S/I or safety issues. She reported on mood symptoms which are improving as her physical health symptoms stabilize. She continues pulmonary  rehabilitation twice per week. She will join a gym for accountability and better health. Jammie continues to work on sleep hygiene. She is also creating a routine for lifestyle balance. She is looking forward to going boating in [place]. Her mother n law has a trailer they can stay in.   Next Thursday will be her last day in the clinic.    As part of EBP group: Self Awareness-Gratitude, we  dentifyied key concepts in gratitude. Patients discussed the benefits of practicing gratitude and its impact on mood improvement, mindfulness, and perspective. Patients discussed the concepts and benefits of feeling grateful. The goal is to reduce rumination and negative thinking resulting in increased mindfulness and resilience. Patients  practiced and problem solve barriers to daily gratitude practice.      Therapeutic Interventions/Treatment Strategies:  Psychotherapist offered support, feedback and validation and reinforced use of skills. Treatment modalities used include Cognitive Behavioral Therapy.    Assessment:    Patient response:   Patient responded to session by accepting feedback, giving feedback, listening, focusing on goals, being attentive and verbalizing understanding    Possible barriers to participation / learning include: COVID19    Health Issues:   Yes: Chronic disease management, Associated Psychological Distress       Substance Use Review:   Substance Use: No active concerns identified.    Mental Status/Behavioral Observations  Appearance:   Appropriate   Eye Contact:   Good   Psychomotor Behavior: Normal   Attitude:   Cooperative  Interested  Orientation:   All  Speech   Rate / Production: Normal    Volume:  Normal   Mood:    Less depressed and less anxious mood  Affect:    Appropriate   Thought Content:   Clear and Safety denies any current safety concerns including suicidal ideation, self-harm, and homicidal ideation  Thought Form:  Coherent  Goal Directed  Logical     Insight:    Good     Plan:     Safety  Plan: No current safety concerns identified.  Recommended that patient call 911 or go to the local ED should there be a change in any of these risk factors.     Barriers to treatment: COVID19    Patient Contracts (see media tab):  None    Substance Use: Not addressed in session     Continue or Discharge: Patient will continue in 55+ Program (55+) as planned. Patient is likely to benefit from learning and using skills as they work toward the goals identified in their treatment plan.  Jammie will continue for mood stabilization and symptom management education for mental health recovery.     Carmen Holman, [facility]  April 29, 2021

## 2021-05-04 ENCOUNTER — TELEPHONE (OUTPATIENT)
Dept: BEHAVIORAL HEALTH | Facility: CLINIC | Age: 56
End: 2021-05-04

## 2021-05-13 ENCOUNTER — TELEPHONE (OUTPATIENT)
Dept: BEHAVIORAL HEALTH | Facility: CLINIC | Age: 56
End: 2021-05-13

## 2021-05-13 NOTE — TELEPHONE ENCOUNTER
----- Message from SURAJ Renner sent at 5/13/2021 12:58 PM CDT -----  Regarding: need appt today, 5/13 for group at 1pm  thanks  Please add appt for TODAY      55+ Clinic 2 1:00-3:00pm.  Thank you  Only today required.

## 2021-05-18 ENCOUNTER — TELEPHONE (OUTPATIENT)
Dept: BEHAVIORAL HEALTH | Facility: CLINIC | Age: 56
End: 2021-05-18

## 2021-07-19 NOTE — GROUP NOTE
Psychoeducation Group Note    PATIENT'S NAME: Jammie Mariee  MRN:   [id number]  :   1965  ACCT. NUMBER: [id number]  DATE OF SERVICE: 20  START TIME: 11:00 AM  END TIME: 11:50 AM  FACILITATOR: Alicia He RN  TOPIC:  RN Group: Mental Health Maintenance  55+ outpatient: A1    Patient Participation / Response:  Fully participated with the group by sharing personal reflections / insights and openly received / provided feedback with other participants.    Demonstrated understanding of topics discussed through group discussion and participation    Treatment Plan:  Patient has a current master individualized treatment plan.  See Epic treatment plan for more information.    Alicia He RN   [Negative] : Neurological [FreeTextEntry9] : Numbness of RIGHT LE

## 2021-09-18 ENCOUNTER — HEALTH MAINTENANCE LETTER (OUTPATIENT)
Age: 56
End: 2021-09-18

## 2022-03-05 ENCOUNTER — HEALTH MAINTENANCE LETTER (OUTPATIENT)
Age: 57
End: 2022-03-05

## 2022-06-14 NOTE — PROGRESS NOTES
Patient Active Problem List   Diagnosis     Bipolar 1 disorder, depressed (H)       Current Outpatient Medications:      divalproex sodium extended-release (DEPAKOTE ER) 250 MG 24 hr tablet, Take 1,000 mg by mouth, Disp: , Rfl:      hydrOXYzine (ATARAX) 25 MG tablet, Take 25 mg by mouth, Disp: , Rfl:      lamoTRIgine (LAMICTAL) 25 MG tablet, Take 1 tab daily for 6 days, then 2 tabs daily for 14 days, then 4 tabs daily for 10 days, Disp: , Rfl:      QUEtiapine (SEROQUEL) 50 MG tablet, Take 7.5 tablets at bedtime and 0.5 tablets daily., Disp: , Rfl:   Psychiatry staffing: case discussed  Diagnosis:  As above, anxiety and panic symptoms occur.      
Statement Selected

## 2022-11-19 ENCOUNTER — HEALTH MAINTENANCE LETTER (OUTPATIENT)
Age: 57
End: 2022-11-19

## 2023-01-01 NOTE — GROUP NOTE
Psychoeducation Group Note    PATIENT'S NAME: Jammie Mariee  MRN:   [id number]  :   1965  ACCT. NUMBER: [id number]  DATE OF SERVICE: 20  START TIME:  9:00 AM  END TIME:  9:50 AM  FACILITATOR: Alicia He RN  TOPIC: DELMAR RN Group: Mind/Body Practice & Complementary  [facility] 55+ Program  TRACK: a1    NUMBER OF PARTICIPANTS: 6    Summary of Group / Topics Discussed:  Mind/Body Practice & Complementary Therapies:  Complementary Therapies: Patients were educated on a variety of complementary therapies that can be used in conjunction with psychotherapy, pharmacotherapy, & other treatments or independently to promote holistic wellness and symptom reduction. Complementary therapies addressed included mindfulness.    Patient Session Goals / Objectives:  ? Defined what a complementary therapy is and why it can be beneficial in conjunction or independently of other treatment modalities   ? Explained how the various complementary therapies are practiced and what are the expected effects  ? Practiced complementary therapy experiential by participating in game to increase mindfulness  Telemedicine Visit: The patient's condition can be safely assessed and treated via synchronous audio and visual telemedicine encounter.      Reason for Telemedicine Visit: Services only offered telehealth    Originating Site (Patient Location): Patient's home    Distant Site (Provider Location): Provider Remote Setting    Consent:  The patient/guardian has verbally consented to: the potential risks and benefits of telemedicine (video visit) versus in person care; bill my insurance or make self-payment for services provided; and responsibility for payment of non-covered services.     Mode of Communication:  Video Conference via [employer]riviala    As the provider I attest to compliance with applicable laws and regulations related to telemedicine.       Patient Participation / Response:  Fully participated with the group by  sharing personal reflections / insights and openly received / provided feedback with other participants.    Demonstrated understanding of topics discussed through group discussion and participation    Treatment Plan:  Patient has a current master individualized treatment plan.  See Epic treatment plan for more information.    Alicia He RN   Statement Selected

## 2023-01-13 NOTE — GROUP NOTE
Telemedicine Visit: The patient's condition can be safely assessed and treated via synchronous audio and visual telemedicine encounter.      Reason for Telemedicine Visit: Services only offered telehealth    Originating Site (Patient Location): Patient's home    Distant Site (Provider Location): Provider Remote Setting    Consent:  The patient/guardian has verbally consented to: the potential risks and benefits of telemedicine (video visit) versus in person care; bill my insurance or make self-payment for services provided; and responsibility for payment of non-covered services.     Mode of Communication:  Video Conference via [employer]    As the provider I attest to compliance with applicable laws and regulations related to telemedicine.  Psychotherapy Group Note    PATIENT'S NAME: Jammie Mariee  MRN:   [id number]  :   1965  ACCT. NUMBER: [id number]  DATE OF SERVICE: 20  START TIME: 11:00 AM  END TIME: 11:50 AM  FACILITATOR: Ermelinda Flynn LMFT  TOPIC:  EBP Group: Coping Skills  55+ Program  TRACK: A1    NUMBER OF PARTICIPANTS: 6    Summary of Group / Topics Discussed:  Coping Skills: Self-Soothe: Patients learned to apply self-soothe as a way to decrease heightened stress in the moment.  Patients identified situations that necessitate self-soothe strategies.  They focused on ways to manage physical symptoms of distress using the senses. They discussed how to distinguish when this can be useful in their lives when other strategies are more relevant or helpful.    Patient Session Goals / Objectives:    Understand the purpose of using the senses to decrease distress    Process what happens in the body when using self-soothe strategies    Demonstrate understanding of when to use self-soothe strategies    Identify and problem solve barriers to applying self-soothe strategies.    Choose 1-2 self-soothe strategies to apply during times of distress.        Patient Participation / Response:  Moderately  participated, sharing some personal reflections / insights and adequately adequately received / provided feedback with other participants.    Demonstrated understanding of topics discussed through group discussion and participation, Demonstrated knowledge of when to consider using a variety of coping skills in daily life and Identified 2-3 positive coping strategies that have helped maintain / improve symptoms in the past    Treatment Plan:  Patient has a current master individualized treatment plan.  See Epic treatment plan for more information.    ARLENE Savage    5-Fu Pregnancy And Lactation Text: This medication is Pregnancy Category X and contraindicated in pregnancy and in women who may become pregnant. It is unknown if this medication is excreted in breast milk.

## 2023-04-15 ENCOUNTER — HEALTH MAINTENANCE LETTER (OUTPATIENT)
Age: 58
End: 2023-04-15